# Patient Record
Sex: MALE | Race: BLACK OR AFRICAN AMERICAN | NOT HISPANIC OR LATINO | Employment: OTHER | URBAN - METROPOLITAN AREA
[De-identification: names, ages, dates, MRNs, and addresses within clinical notes are randomized per-mention and may not be internally consistent; named-entity substitution may affect disease eponyms.]

---

## 2017-06-14 ENCOUNTER — HOSPITAL ENCOUNTER (EMERGENCY)
Facility: HOSPITAL | Age: 57
Discharge: HOME | End: 2017-06-14
Attending: EMERGENCY MEDICINE
Payer: MEDICARE

## 2017-06-14 VITALS
HEIGHT: 69 IN | BODY MASS INDEX: 21.03 KG/M2 | DIASTOLIC BLOOD PRESSURE: 97 MMHG | WEIGHT: 142 LBS | SYSTOLIC BLOOD PRESSURE: 181 MMHG | OXYGEN SATURATION: 98 % | RESPIRATION RATE: 16 BRPM | HEART RATE: 68 BPM | TEMPERATURE: 98 F

## 2017-06-14 DIAGNOSIS — M25.519 SHOULDER PAIN: ICD-10-CM

## 2017-06-14 DIAGNOSIS — I10 ESSENTIAL HYPERTENSION: Primary | ICD-10-CM

## 2017-06-14 PROCEDURE — 25000003 PHARM REV CODE 250: Performed by: EMERGENCY MEDICINE

## 2017-06-14 PROCEDURE — 99283 EMERGENCY DEPT VISIT LOW MDM: CPT

## 2017-06-14 RX ORDER — CYCLOBENZAPRINE HCL 10 MG
10 TABLET ORAL
Status: COMPLETED | OUTPATIENT
Start: 2017-06-14 | End: 2017-06-14

## 2017-06-14 RX ORDER — CYCLOBENZAPRINE HCL 10 MG
10 TABLET ORAL 3 TIMES DAILY PRN
Qty: 12 TABLET | Refills: 0 | Status: SHIPPED | OUTPATIENT
Start: 2017-06-14 | End: 2017-06-19

## 2017-06-14 RX ADMIN — CYCLOBENZAPRINE HYDROCHLORIDE 10 MG: 10 TABLET, FILM COATED ORAL at 01:06

## 2017-06-14 NOTE — ED PROVIDER NOTES
"Encounter Date: 6/14/2017       History     Chief Complaint   Patient presents with    Arm Pain     worening rt. shoulder/arm pain and stiffness x4 days. limited rom noted. denies trauma or injury but reports hx. of dislocation     Review of patient's allergies indicates:  No Known Allergies  58 y/o male with pmhx of cervicalgia, DM, HLD, HTN, jaw pain, lipoma, lumbago, sciatica, and scoliosis presents with c/o right shoulder pain.  Pt reports that he slept on his shoulder "wrong" Friday night and woke up with right shoulder pain on Saturday morning.  Pain is well localized, sharp, stabbing and non-radiating. Pt has had constant right shoulder pain since that time and states that the pain is exacerbated with movement and palpation.  He reports that he is not weak but he has pain with abducting his right arm at the shoulder joint so he has not been using it much.  Pt has a hx of multiple shoulder dislocations as a child and states that this pain is similar.  He also states that he has dislocated his shoulder several times by "sleeping on it wrong."  No trauma.  No swelling.  No fever/chills/numbness or weakness.  No cp, neck pain or jaw pain.  No relieving factors attempted.           Past Medical History:   Diagnosis Date    Cervicalgia     Diabetes mellitus     Hypercholesteremia     Hypertension     Jaw pain     Lipoma of back     Lumbago     Premature beats, unspecified     Sciatica     Scoliosis (and kyphoscoliosis), idiopathic     Undiagnosed cardiac murmurs      History reviewed. No pertinent surgical history.  History reviewed. No pertinent family history.  Social History   Substance Use Topics    Smoking status: Never Smoker    Smokeless tobacco: Not on file    Alcohol use No     Review of Systems   Constitutional: Negative for chills, diaphoresis and fever.   HENT: Negative.    Eyes: Negative.    Respiratory: Negative for cough and shortness of breath.    Cardiovascular: Negative for chest " pain and palpitations.   Gastrointestinal: Negative for nausea and vomiting.   Endocrine: Negative.    Genitourinary: Negative.    Musculoskeletal: Negative for back pain, joint swelling and neck pain.        Well localized right shoulder pain that is sharp and stabbing   Skin: Negative for pallor and wound.   Allergic/Immunologic: Negative.    Neurological: Negative for weakness and numbness.   Hematological: Does not bruise/bleed easily.   Psychiatric/Behavioral: Negative.    All other systems reviewed and are negative.      Physical Exam     Initial Vitals [06/14/17 0047]   BP Pulse Resp Temp SpO2   (!) 181/97 68 16 97.5 °F (36.4 °C) 98 %     Physical Exam    Nursing note and vitals reviewed.  Constitutional: He appears well-developed and well-nourished. He is not diaphoretic. No distress.   HENT:   Head: Normocephalic and atraumatic.   Eyes: Conjunctivae are normal.   Neck: Normal range of motion. Neck supple.   Cardiovascular: Normal rate, regular rhythm and normal heart sounds. Exam reveals no gallop and no friction rub.    No murmur heard.  Pulmonary/Chest: Breath sounds normal. No respiratory distress. He has no wheezes. He has no rhonchi. He has no rales. He exhibits no tenderness.   Abdominal: Soft. He exhibits no distension. There is no tenderness.   Musculoskeletal: He exhibits tenderness. He exhibits no edema.        Right shoulder: He exhibits decreased range of motion, tenderness and pain. He exhibits no bony tenderness, no swelling, no effusion, no crepitus, no deformity, no laceration, no spasm, normal pulse and normal strength.        Arms:  Decreased rom due to pain.  Pt is able to abduct his right shoulder but reports pain when doing so.  No evidence of glenohumeral step off.     Lymphadenopathy:     He has no cervical adenopathy.   Neurological: He is alert and oriented to person, place, and time. He has normal strength. No sensory deficit.   Skin: Skin is warm and dry. No rash noted. No  erythema.   Psychiatric: He has a normal mood and affect. His behavior is normal. Judgment and thought content normal.         ED Course   Procedures  Labs Reviewed - No data to display          Medical Decision Making:   Initial Assessment:   58 y/o male with right shoulder pain.  Pt is otherwise nv intact.  Pt has a hx of htn but has not taken his meds today because of the pain  Differential Diagnosis:   DDX:  Sprain, fracture, dislocation, muscle spasm, HTN  Clinical Tests:   Radiological Study: Ordered and Reviewed  ED Management:  Xray neg.  Pt is nv intact by exam.  Pt is educated on the importance of taking his routine meds.  I will Rx flexeril for pain most likely due to muscle strain.  Pt has a pcp and will f/u this week regarding pain and b/p.     Pt counseled on their diagnosis and the importance of following up with PCP.  Pt also cautioned on when to return to ED.  Pt verbalizes understanding of discharge plan and will return to ED immediately if symptoms worsen                     ED Course     Clinical Impression:   The primary encounter diagnosis was Essential hypertension. A diagnosis of Shoulder pain was also pertinent to this visit.    Disposition:   Disposition: Discharged  Condition: Stable       Laura Bermeo MD  06/14/17 0202

## 2017-06-14 NOTE — ED NOTES
Patient identifiers verified and correct for Romero Bowens Jr..    LOC: The patient is awake, alert and aware of environment with an appropriate affect, the patient is oriented x 3 and speaking appropriately.  APPEARANCE: Patient resting comfortably and in no acute distressSKIN: The skin is warm and dry, color consistent with ethnicity, patient has normal skin turgor and moist mucus membranes, skin intact, no breakdown or bruising noted.  RESPIRATORY: Airway is open and patent, respirations are spontaneous, patient has a normal effort and rate, no accessory muscle use noted, bilateral breath sounds CTA  CARDIAC: Patient has a normal rate and regular rhythm, no periphreal edema noted, capillary refill < 3 seconds.  ABDOMEN: Soft and non tender to palpation, no distention noted, normoactive bowel sounds present in all four quadrants.

## 2017-06-19 PROCEDURE — 93010 ELECTROCARDIOGRAM REPORT: CPT | Mod: ,,, | Performed by: INTERNAL MEDICINE

## 2017-06-19 PROCEDURE — 96360 HYDRATION IV INFUSION INIT: CPT

## 2017-06-19 PROCEDURE — 93005 ELECTROCARDIOGRAM TRACING: CPT

## 2017-06-19 PROCEDURE — 99284 EMERGENCY DEPT VISIT MOD MDM: CPT | Mod: 25

## 2017-06-20 ENCOUNTER — HOSPITAL ENCOUNTER (EMERGENCY)
Facility: HOSPITAL | Age: 57
Discharge: HOME | End: 2017-06-20
Attending: EMERGENCY MEDICINE
Payer: MEDICARE

## 2017-06-20 VITALS
HEIGHT: 69 IN | HEART RATE: 61 BPM | DIASTOLIC BLOOD PRESSURE: 67 MMHG | SYSTOLIC BLOOD PRESSURE: 110 MMHG | OXYGEN SATURATION: 96 % | BODY MASS INDEX: 21.03 KG/M2 | TEMPERATURE: 98 F | WEIGHT: 142 LBS | RESPIRATION RATE: 15 BRPM

## 2017-06-20 DIAGNOSIS — R53.1 WEAKNESS: Primary | ICD-10-CM

## 2017-06-20 LAB
ALBUMIN SERPL BCP-MCNC: 3.5 G/DL
ALP SERPL-CCNC: 71 U/L
ALT SERPL W/O P-5'-P-CCNC: 26 U/L
ANION GAP SERPL CALC-SCNC: 9 MMOL/L
AST SERPL-CCNC: 30 U/L
BASOPHILS # BLD AUTO: 0.01 K/UL
BASOPHILS NFR BLD: 0.2 %
BILIRUB SERPL-MCNC: 0.5 MG/DL
BNP SERPL-MCNC: 30 PG/ML
BUN SERPL-MCNC: 21 MG/DL
CALCIUM SERPL-MCNC: 8.9 MG/DL
CHLORIDE SERPL-SCNC: 103 MMOL/L
CO2 SERPL-SCNC: 26 MMOL/L
CREAT SERPL-MCNC: 1.4 MG/DL
D DIMER PPP IA.FEU-MCNC: 0.2 MG/L FEU
DIFFERENTIAL METHOD: ABNORMAL
EOSINOPHIL # BLD AUTO: 0.1 K/UL
EOSINOPHIL NFR BLD: 2.1 %
ERYTHROCYTE [DISTWIDTH] IN BLOOD BY AUTOMATED COUNT: 12.7 %
EST. GFR  (AFRICAN AMERICAN): >60 ML/MIN/1.73 M^2
EST. GFR  (NON AFRICAN AMERICAN): 55 ML/MIN/1.73 M^2
GLUCOSE SERPL-MCNC: 168 MG/DL
HCT VFR BLD AUTO: 43.3 %
HGB BLD-MCNC: 14.7 G/DL
LYMPHOCYTES # BLD AUTO: 3.1 K/UL
LYMPHOCYTES NFR BLD: 57.8 %
MCH RBC QN AUTO: 29.7 PG
MCHC RBC AUTO-ENTMCNC: 33.9 %
MCV RBC AUTO: 88 FL
MONOCYTES # BLD AUTO: 0.5 K/UL
MONOCYTES NFR BLD: 8.5 %
NEUTROPHILS # BLD AUTO: 1.7 K/UL
NEUTROPHILS NFR BLD: 31.4 %
PLATELET # BLD AUTO: 180 K/UL
PMV BLD AUTO: 10.9 FL
POTASSIUM SERPL-SCNC: 4.3 MMOL/L
PROT SERPL-MCNC: 6.6 G/DL
RBC # BLD AUTO: 4.95 M/UL
SODIUM SERPL-SCNC: 138 MMOL/L
TROPONIN I SERPL DL<=0.01 NG/ML-MCNC: 0.02 NG/ML
WBC # BLD AUTO: 5.28 K/UL

## 2017-06-20 PROCEDURE — 83880 ASSAY OF NATRIURETIC PEPTIDE: CPT

## 2017-06-20 PROCEDURE — 84484 ASSAY OF TROPONIN QUANT: CPT

## 2017-06-20 PROCEDURE — 80053 COMPREHEN METABOLIC PANEL: CPT

## 2017-06-20 PROCEDURE — 85379 FIBRIN DEGRADATION QUANT: CPT

## 2017-06-20 PROCEDURE — 85025 COMPLETE CBC W/AUTO DIFF WBC: CPT

## 2017-06-20 PROCEDURE — 25000003 PHARM REV CODE 250: Performed by: EMERGENCY MEDICINE

## 2017-06-20 RX ORDER — SODIUM CHLORIDE 9 MG/ML
1000 INJECTION, SOLUTION INTRAVENOUS
Status: COMPLETED | OUTPATIENT
Start: 2017-06-20 | End: 2017-06-20

## 2017-06-20 RX ADMIN — SODIUM CHLORIDE 1000 ML: 0.9 INJECTION, SOLUTION INTRAVENOUS at 02:06

## 2017-06-20 NOTE — ED PROVIDER NOTES
Encounter Date: 6/19/2017       History     Chief Complaint   Patient presents with    Weakness     Patient presents to the ED with reports of having weakness with dizziness that started yesterday. Symptoms include shortness of breath on exertion.      Review of patient's allergies indicates:  No Known Allergies  57-year-old male with 2 episodes since yesterday of weakness and feeling faint hot blurred vision, and shortness of breath on exertion.  He's been eating and drinking as usual, however has been feeling lightheaded when he sits stands up.  He has never passed out, but has had 2 episodes of these since yesterday.  He also reports having some palpitations.  He has never been told he has irregular heart rhythms.      The history is provided by the patient.     Past Medical History:   Diagnosis Date    Cervicalgia     Diabetes mellitus     Hypercholesteremia     Hypertension     Jaw pain     Lipoma of back     Lumbago     Premature beats, unspecified     Sciatica     Scoliosis (and kyphoscoliosis), idiopathic     Undiagnosed cardiac murmurs      History reviewed. No pertinent surgical history.  History reviewed. No pertinent family history.  Social History   Substance Use Topics    Smoking status: Never Smoker    Smokeless tobacco: Never Used    Alcohol use No     Review of Systems   Eyes: Negative.    Respiratory: Negative.    Endocrine: Negative.    Genitourinary: Negative.    All other systems reviewed and are negative.      Physical Exam     Initial Vitals [06/19/17 2338]   BP Pulse Resp Temp SpO2   (!) 93/55 85 20 97.9 °F (36.6 °C) 98 %     Physical Exam    Nursing note and vitals reviewed.  Constitutional: He appears well-developed and well-nourished.   HENT:   Head: Normocephalic.   Eyes: EOM are normal. Pupils are equal, round, and reactive to light.   Neck: Normal range of motion.   Cardiovascular: Normal rate, regular rhythm and normal heart sounds.   Pulmonary/Chest: Breath sounds  normal.   Abdominal: Soft. Bowel sounds are normal.   Musculoskeletal: Normal range of motion.   Neurological: He is alert and oriented to person, place, and time.   Skin: Skin is warm and dry.   Psychiatric: He has a normal mood and affect.         ED Course   Procedures  Labs Reviewed   CBC W/ AUTO DIFFERENTIAL - Abnormal; Notable for the following:        Result Value    Gran # 1.7 (*)     Gran% 31.4 (*)     Lymph% 57.8 (*)     All other components within normal limits   COMPREHENSIVE METABOLIC PANEL - Abnormal; Notable for the following:     Glucose 168 (*)     BUN, Bld 21 (*)     eGFR if non  55 (*)     All other components within normal limits   TROPONIN I   D DIMER, QUANTITATIVE   D DIMER, QUANTITATIVE   B-TYPE NATRIURETIC PEPTIDE   B-TYPE NATRIURETIC PEPTIDE     EKG Readings: (Independently Interpreted)   Initial Reading: No STEMI. Rhythm: Normal Sinus Rhythm. Ectopy: No Ectopy. Conduction: Normal. ST Segments: Normal ST Segments. T Waves: Normal. Axis: Normal.   NSR rate of 69, normal axis, no ST changes           Medical Decision Making:   Initial Assessment:   57-year-old male presents to the emergency department with a 2 episodes of weakness and presyncopal episodes  Differential Diagnosis:   Arrhythmia, metabolic derangement, hypokalemia, hypothyroidism  Clinical Tests:   Lab Tests: Ordered and Reviewed  Radiological Study: Ordered and Reviewed  Medical Tests: Ordered and Reviewed  ED Management:  Patient felt better with IVF  Serial troponins normal  DDimer negative  CBC and CMP WNL  CXR clear    Patient discharged in stable condition with return precautions for all conditions discussed with patient and/or any available family members. No further emergent ED evaluation or treatment clinically indicated at this time.      I discussed with patient that evaluation in the ED at this time does not suggest any emergent or life threatening condition medical condition requiring immediate  intervention beyond what was provided in the ED. Regardless, an unremarkable evaluation in the ED does not preclude the development or presence of a serious of life threatening condition. As such, patient was instructed to return immediately for any worsening or change in current symptoms.                     ED Course     Clinical Impression:   The encounter diagnosis was Weakness.          Hilda Feldman MD  06/23/17 1352

## 2017-06-20 NOTE — ED TRIAGE NOTES
Patient presents to the ED with reports of having weakness with dizziness that started yesterday. Symptoms include shortness of breath on exertion. Denies any fever, chills, nausea, vomiting or diarrhea.     Review of patient's allergies indicates:  No Known Allergies     Patient has verified the spelling of their name and  on armband.   APPEARANCE: Patient is alert, calm, oriented x 4, and does not appear distressed.  SKIN: Skin is normal for race, warm, and dry. Normal skin turgor and mucous membranes moist.  CARDIAC: Normal rate and rhythm, no murmur heard.   RESPIRATORY:Normal rate and effort. Breath sounds clear bilaterally throughout chest. Respirations are equal and unlabored. Shortness of breath on exertion. Denies any SOB at this time.   GASTRO: Bowel sounds normal, abdomen is soft, no tenderness, and no abdominal distention.  MUSCLE: Full ROM. No bony tenderness or soft tissue tenderness. No obvious deformity.  NEURO: 5/5 strength major flexors/extensors bilaterally. Sensory intact to light touch bilaterally. GCS 15. +Weakness +Dizziness.   MENTAL STATUS: awake, alert and aware of environment.  EYE: PERRL, both eyes: pupils brisk and reactive to light. Normal size.

## 2017-11-09 ENCOUNTER — HOSPITAL ENCOUNTER (EMERGENCY)
Facility: HOSPITAL | Age: 57
Discharge: HOME | End: 2017-11-10
Attending: EMERGENCY MEDICINE
Payer: MEDICARE

## 2017-11-09 VITALS
RESPIRATION RATE: 17 BRPM | OXYGEN SATURATION: 99 % | WEIGHT: 145 LBS | HEIGHT: 69 IN | DIASTOLIC BLOOD PRESSURE: 76 MMHG | TEMPERATURE: 98 F | HEART RATE: 67 BPM | BODY MASS INDEX: 21.48 KG/M2 | SYSTOLIC BLOOD PRESSURE: 149 MMHG

## 2017-11-09 DIAGNOSIS — R53.1 WEAKNESS: Primary | ICD-10-CM

## 2017-11-09 DIAGNOSIS — R73.9 HYPERGLYCEMIA: ICD-10-CM

## 2017-11-09 DIAGNOSIS — N30.00 ACUTE CYSTITIS WITHOUT HEMATURIA: ICD-10-CM

## 2017-11-09 LAB
ALBUMIN SERPL BCP-MCNC: 3.7 G/DL
ALP SERPL-CCNC: 71 U/L
ALT SERPL W/O P-5'-P-CCNC: 38 U/L
AMPHET+METHAMPHET UR QL: NEGATIVE
ANION GAP SERPL CALC-SCNC: 11 MMOL/L
AST SERPL-CCNC: 22 U/L
BACTERIA #/AREA URNS HPF: ABNORMAL /HPF
BARBITURATES UR QL SCN>200 NG/ML: NEGATIVE
BASOPHILS # BLD AUTO: 0.02 K/UL
BASOPHILS NFR BLD: 0.3 %
BENZODIAZ UR QL SCN>200 NG/ML: NEGATIVE
BILIRUB SERPL-MCNC: 0.5 MG/DL
BILIRUB UR QL STRIP: NEGATIVE
BUN SERPL-MCNC: 11 MG/DL
BZE UR QL SCN: NEGATIVE
CALCIUM SERPL-MCNC: 9.6 MG/DL
CANNABINOIDS UR QL SCN: NORMAL
CHLORIDE SERPL-SCNC: 100 MMOL/L
CK SERPL-CCNC: 110 U/L
CLARITY UR: CLEAR
CO2 SERPL-SCNC: 27 MMOL/L
COLOR UR: YELLOW
CREAT SERPL-MCNC: 1.2 MG/DL
CREAT UR-MCNC: 104.5 MG/DL
DIFFERENTIAL METHOD: NORMAL
EOSINOPHIL # BLD AUTO: 0 K/UL
EOSINOPHIL NFR BLD: 0.6 %
ERYTHROCYTE [DISTWIDTH] IN BLOOD BY AUTOMATED COUNT: 11.9 %
EST. GFR  (AFRICAN AMERICAN): >60 ML/MIN/1.73 M^2
EST. GFR  (NON AFRICAN AMERICAN): >60 ML/MIN/1.73 M^2
ETHANOL SERPL-MCNC: <10 MG/DL
FLUAV AG SPEC QL IA: NEGATIVE
FLUBV AG SPEC QL IA: NEGATIVE
GLUCOSE SERPL-MCNC: 396 MG/DL
GLUCOSE UR QL STRIP: ABNORMAL
HCT VFR BLD AUTO: 44.3 %
HGB BLD-MCNC: 15.7 G/DL
HGB UR QL STRIP: ABNORMAL
KETONES UR QL STRIP: ABNORMAL
LEUKOCYTE ESTERASE UR QL STRIP: ABNORMAL
LYMPHOCYTES # BLD AUTO: 3.1 K/UL
LYMPHOCYTES NFR BLD: 47.1 %
MCH RBC QN AUTO: 30.3 PG
MCHC RBC AUTO-ENTMCNC: 35.4 G/DL
MCV RBC AUTO: 86 FL
METHADONE UR QL SCN>300 NG/ML: NEGATIVE
MICROSCOPIC COMMENT: ABNORMAL
MONOCYTES # BLD AUTO: 0.5 K/UL
MONOCYTES NFR BLD: 6.9 %
NEUTROPHILS # BLD AUTO: 3 K/UL
NEUTROPHILS NFR BLD: 44.9 %
NITRITE UR QL STRIP: NEGATIVE
OPIATES UR QL SCN: NEGATIVE
PCP UR QL SCN>25 NG/ML: NEGATIVE
PH UR STRIP: 6 [PH] (ref 5–8)
PLATELET # BLD AUTO: 198 K/UL
PMV BLD AUTO: 12.1 FL
POCT GLUCOSE: 162 MG/DL (ref 70–110)
POCT GLUCOSE: 381 MG/DL (ref 70–110)
POTASSIUM SERPL-SCNC: 4.4 MMOL/L
PROT SERPL-MCNC: 7 G/DL
PROT UR QL STRIP: NEGATIVE
RBC # BLD AUTO: 5.18 M/UL
RBC #/AREA URNS HPF: 8 /HPF (ref 0–4)
SODIUM SERPL-SCNC: 138 MMOL/L
SP GR UR STRIP: 1.01 (ref 1–1.03)
SPECIMEN SOURCE: NORMAL
SQUAMOUS #/AREA URNS HPF: 3 /HPF
TOXICOLOGY INFORMATION: NORMAL
URN SPEC COLLECT METH UR: ABNORMAL
UROBILINOGEN UR STRIP-ACNC: NEGATIVE EU/DL
WBC # BLD AUTO: 6.66 K/UL
WBC #/AREA URNS HPF: 10 /HPF (ref 0–5)
YEAST URNS QL MICRO: ABNORMAL

## 2017-11-09 PROCEDURE — 93010 ELECTROCARDIOGRAM REPORT: CPT | Mod: ,,, | Performed by: INTERNAL MEDICINE

## 2017-11-09 PROCEDURE — 85025 COMPLETE CBC W/AUTO DIFF WBC: CPT

## 2017-11-09 PROCEDURE — 82962 GLUCOSE BLOOD TEST: CPT

## 2017-11-09 PROCEDURE — 82550 ASSAY OF CK (CPK): CPT

## 2017-11-09 PROCEDURE — 25000003 PHARM REV CODE 250: Performed by: EMERGENCY MEDICINE

## 2017-11-09 PROCEDURE — 87400 INFLUENZA A/B EACH AG IA: CPT

## 2017-11-09 PROCEDURE — 80307 DRUG TEST PRSMV CHEM ANLYZR: CPT

## 2017-11-09 PROCEDURE — 96361 HYDRATE IV INFUSION ADD-ON: CPT

## 2017-11-09 PROCEDURE — 96374 THER/PROPH/DIAG INJ IV PUSH: CPT

## 2017-11-09 PROCEDURE — 99284 EMERGENCY DEPT VISIT MOD MDM: CPT | Mod: 25

## 2017-11-09 PROCEDURE — 63600175 PHARM REV CODE 636 W HCPCS: Performed by: EMERGENCY MEDICINE

## 2017-11-09 PROCEDURE — 81000 URINALYSIS NONAUTO W/SCOPE: CPT

## 2017-11-09 PROCEDURE — 93005 ELECTROCARDIOGRAM TRACING: CPT

## 2017-11-09 PROCEDURE — 80320 DRUG SCREEN QUANTALCOHOLS: CPT

## 2017-11-09 PROCEDURE — 80053 COMPREHEN METABOLIC PANEL: CPT

## 2017-11-09 RX ORDER — CEPHALEXIN 500 MG/1
500 CAPSULE ORAL EVERY 12 HOURS
Qty: 10 CAPSULE | Refills: 0 | Status: SHIPPED | OUTPATIENT
Start: 2017-11-09 | End: 2019-04-08

## 2017-11-09 RX ORDER — SODIUM CHLORIDE 9 MG/ML
1000 INJECTION, SOLUTION INTRAVENOUS
Status: COMPLETED | OUTPATIENT
Start: 2017-11-09 | End: 2017-11-09

## 2017-11-09 RX ADMIN — INSULIN HUMAN 5 UNITS: 100 INJECTION, SOLUTION PARENTERAL at 10:11

## 2017-11-09 RX ADMIN — SODIUM CHLORIDE 1000 ML: 0.9 INJECTION, SOLUTION INTRAVENOUS at 10:11

## 2017-11-09 RX ADMIN — SODIUM CHLORIDE 1000 ML: 0.9 INJECTION, SOLUTION INTRAVENOUS at 09:11

## 2017-11-10 NOTE — ED PROVIDER NOTES
Encounter Date: 11/9/2017    SCRIBE #1 NOTE: I, Salome Bergeron, am scribing for, and in the presence of, Dr. Gallardo.       History     Chief Complaint   Patient presents with    Weakness     patient ambulatory to triage and reports weakness with body aches x 2 weeks and was worse with joint pain and weakness today     Time seen by provider: 10:10 PM    This is a 57 y.o. male with history of HTN, DM, HLD, who presents with complaint of persistent fatigue x 2 weeks. Patient states that he believes his fatigue is related to elevated blood glucose level. He takes metformin and reports compliant with this. He endorses associated urinary frequency. He denies fever, chest pain, SOB, dysuria, sore throat, vomiting, or other symptoms at this time. The patient's PCP is Dr. Acosta.       The history is provided by the patient.     Review of patient's allergies indicates:  No Known Allergies  Past Medical History:   Diagnosis Date    Cervicalgia     Diabetes mellitus     Hypercholesteremia     Hypertension     Jaw pain     Lipoma of back     Lumbago     Premature beats, unspecified     Sciatica     Scoliosis (and kyphoscoliosis), idiopathic     Undiagnosed cardiac murmurs      History reviewed. No pertinent surgical history.  History reviewed. No pertinent family history.  Social History   Substance Use Topics    Smoking status: Never Smoker    Smokeless tobacco: Never Used    Alcohol use No     Review of Systems   Constitutional: Positive for fatigue. Negative for chills and fever.   HENT: Negative for congestion, rhinorrhea and trouble swallowing.    Eyes: Negative for photophobia, pain, redness and visual disturbance.   Respiratory: Negative for shortness of breath.    Cardiovascular: Negative for chest pain.   Gastrointestinal: Negative for abdominal pain, diarrhea, nausea and vomiting.   Endocrine: Positive for polydipsia, polyphagia and polyuria.   Genitourinary: Positive for frequency and urgency. Negative  for dysuria and flank pain.   Musculoskeletal: Negative for back pain, neck pain and neck stiffness.   Skin: Negative for rash.   Neurological: Negative for seizures, syncope, speech difficulty, numbness and headaches.   All other systems reviewed and are negative.      Physical Exam     Initial Vitals [11/09/17 2039]   BP Pulse Resp Temp SpO2   135/66 100 20 98.8 °F (37.1 °C) 98 %      MAP       89         Physical Exam    Nursing note and vitals reviewed.  Constitutional: He appears well-developed and well-nourished. No distress.   HENT:   Head: Normocephalic and atraumatic.   Mouth/Throat: Mucous membranes are dry.   Oropharynx clear.   Eyes: Conjunctivae and EOM are normal. Pupils are equal, round, and reactive to light.   Neck: Normal range of motion. Neck supple. No tracheal deviation present.   Cardiovascular: Normal rate, regular rhythm, normal heart sounds and intact distal pulses.   Pulmonary/Chest: Breath sounds normal. No respiratory distress. He has no wheezes. He has no rhonchi. He has no rales.   Abdominal: Soft. Bowel sounds are normal. He exhibits no distension. There is no tenderness. There is no rebound and no guarding.   Musculoskeletal: Normal range of motion. He exhibits no edema or tenderness.   Neurological: He is alert and oriented to person, place, and time. He has normal strength. No cranial nerve deficit or sensory deficit.   Skin: Skin is warm and dry. Capillary refill takes less than 2 seconds.         ED Course   Procedures  Labs Reviewed   COMPREHENSIVE METABOLIC PANEL - Abnormal; Notable for the following:        Result Value    Glucose 396 (*)     All other components within normal limits   URINALYSIS - Abnormal; Notable for the following:     Glucose, UA 3+ (*)     Ketones, UA 1+ (*)     Occult Blood UA 1+ (*)     Leukocytes, UA Trace (*)     All other components within normal limits   URINALYSIS MICROSCOPIC - Abnormal; Notable for the following:     RBC, UA 8 (*)     WBC, UA 10  (*)     Bacteria, UA Few (*)     Yeast, UA Rare (*)     All other components within normal limits   POCT GLUCOSE - Abnormal; Notable for the following:     POCT Glucose 381 (*)     All other components within normal limits   POCT GLUCOSE - Abnormal; Notable for the following:     POCT Glucose 162 (*)     All other components within normal limits   CBC W/ AUTO DIFFERENTIAL   CK   INFLUENZA A AND B ANTIGEN   ALCOHOL,MEDICAL (ETHANOL)   DRUG SCREEN PANEL, URINE EMERGENCY   POCT GLUCOSE MONITORING CONTINUOUS   POCT GLUCOSE MONITORING CONTINUOUS     Imaging Results          X-Ray Chest PA And Lateral (Final result)  Result time 11/09/17 21:30:50    Final result by Arian Laura MD (11/09/17 21:30:50)                 Impression:        No detrimental change or radiographic acute intrathoracic process seen.      Electronically signed by: ARIAN LAURA MD, MD  Date:     11/09/17  Time:    21:30              Narrative:    COMPARISON: Chest radiograph 6/20/17    FINDINGS: PA and lateral views of the chest. No detrimental change.   Pulmonary vasculature and hilar regions are within normal limits.  The bilateral lungs are well expanded and clear.  No pleural effusion or pneumothorax.  Calcific atherosclerosis of the aortic arch. The heart and mediastinal contours are within normal limits for age.  Included osseous structures appear stable without acute process seen.                                   X-Rays:   Independently Interpreted Readings:   Chest X-Ray: Chest x-ray interpreted by radiologist and visualized by me:     Imaging Results          X-Ray Chest PA And Lateral (Final result)  Result time 11/09/17 21:30:50    Final result by Arian Laura MD (11/09/17 21:30:50)                 Impression:        No detrimental change or radiographic acute intrathoracic process seen.      Electronically signed by: ARIAN LAURA MD, MD  Date:     11/09/17  Time:    21:30              Narrative:    COMPARISON: Chest radiograph  6/20/17    FINDINGS: PA and lateral views of the chest. No detrimental change.   Pulmonary vasculature and hilar regions are within normal limits.  The bilateral lungs are well expanded and clear.  No pleural effusion or pneumothorax.  Calcific atherosclerosis of the aortic arch. The heart and mediastinal contours are within normal limits for age.  Included osseous structures appear stable without acute process seen.                              Medical Decision Making:   History:   Old Medical Records: I decided to obtain old medical records.  Initial Assessment:   This is a 57 y.o. male with history of HTN, DM, HLD, who presents with complaint of persistent fatigue x 2 weeks  Differential Diagnosis:   DKA, dehydration, hypoglycemia, electrolyte dyscrasia, rhabdomyolysis, substance abuse,  Independently Interpreted Test(s):   I have ordered and independently interpreted X-rays - see prior notes.  Clinical Tests:   Lab Tests: Reviewed       <> Summary of Lab: Hhyperglycemia, UTI  ED Management:  11:35 PM  Sugar controlled with IV fluids and insulin. Vital signs stable and he has had no complaints at this time. He was instructed to FU with his PCP for further management and evaluation and return for any new or worsening symptoms. Given a prescription for his urinary tract infection.  Urine             Scribe Attestation:   Scribe #1: I performed the above scribed service and the documentation accurately describes the services I performed. I attest to the accuracy of the note.            ED Course      Clinical Impression:   The primary encounter diagnosis was Weakness. Diagnoses of Hyperglycemia and Acute cystitis without hematuria were also pertinent to this visit.    Disposition:   Disposition: Discharged  Condition: Stable       I, Dr. Magno Gallardo, personally performed the services described in this documentation. All medical record entries made by the scribe were at my direction and in my presence.  I have  reviewed the chart and agree that the record reflects my personal performance and is accurate and complete. Magno Gallardo MD.  12:43 AM 11/15/2017                     Magno Gallardo MD  11/15/17 0045       Magno Gallardo MD  11/15/17 0046

## 2018-03-09 ENCOUNTER — TELEPHONE (OUTPATIENT)
Dept: TRANSPLANT | Facility: CLINIC | Age: 58
End: 2018-03-09

## 2018-03-09 NOTE — TELEPHONE ENCOUNTER
----- Message from Yoli Hearn sent at 3/9/2018 11:55 AM CST -----  We have the pt recorders and they are now pending review by the referral nurse.  By:Yoli Hearn

## 2018-03-12 ENCOUNTER — DOCUMENTATION ONLY (OUTPATIENT)
Dept: TRANSPLANT | Facility: CLINIC | Age: 58
End: 2018-03-12

## 2018-03-12 NOTE — LETTER
March 12, 2018    Romero Bowens  [address]  [place] [address]      Dear Romero Bowens:    Your doctor has referred you to the [facility]. You will be contacted by our office and an initial appointment will then be scheduled for you.    We look forward to seeing you soon. If you have any further questions, please contact us at [phone].       Sincerely,        [facility]   [facility] [place] [address]  [phone]

## 2018-03-12 NOTE — LETTER
March 12, 2018    Dave Donaldson NP  [address]  [address]  [facility] [address]      Dear Dr. Donaldson    Patient: Romero Bowens Jr.   MR Number: [id number]   YOB: 1960     Thank you for the referral of Romero Bowens Jr. to the [facility]. An initial appointment will be scheduled for your patient with one of our Hepatologists.      Thank you again for your trust in our program.  If there is anything we can do for you or your staff, please feel free to contact us.        Sincerely,        [facility]  [address] [place] [id number]  [phone]

## 2018-03-12 NOTE — NURSING
Pt records reviewed.  Pt will be referred to [facility] due to HEP C ab +  Initial referral received  from Dave Donaldson NP 's  office.   Referral letter sent to provider and patient.  Pt records reviewed.

## 2018-03-19 ENCOUNTER — TELEPHONE (OUTPATIENT)
Dept: HEPATOLOGY | Facility: CLINIC | Age: 58
End: 2018-03-19

## 2018-03-19 NOTE — TELEPHONE ENCOUNTER
Dave Donaldson NP has ordered that patient be scheduled for hep c consult.  Attempt made to reach him by phone to schedule appt with [facility].  Phone numbers invalid.  Letter sent asking that he give us a call for scheduling.

## 2018-04-27 NOTE — TELEPHONE ENCOUNTER
No response received from patient for hep c consult scheduling.  Chart sent for scanning into Epic.

## 2018-05-22 ENCOUNTER — HOSPITAL ENCOUNTER (EMERGENCY)
Facility: HOSPITAL | Age: 58
Discharge: HOME | End: 2018-05-22
Attending: EMERGENCY MEDICINE
Payer: MEDICARE

## 2018-05-22 VITALS
HEART RATE: 74 BPM | TEMPERATURE: 98 F | BODY MASS INDEX: 22.22 KG/M2 | WEIGHT: 150 LBS | HEIGHT: 69 IN | OXYGEN SATURATION: 99 % | SYSTOLIC BLOOD PRESSURE: 128 MMHG | RESPIRATION RATE: 16 BRPM | DIASTOLIC BLOOD PRESSURE: 77 MMHG

## 2018-05-22 DIAGNOSIS — R42 DIZZINESS: ICD-10-CM

## 2018-05-22 DIAGNOSIS — N17.9 ACUTE KIDNEY INJURY: ICD-10-CM

## 2018-05-22 DIAGNOSIS — I95.9 HYPOTENSION, UNSPECIFIED HYPOTENSION TYPE: Primary | ICD-10-CM

## 2018-05-22 DIAGNOSIS — R06.02 SOB (SHORTNESS OF BREATH): ICD-10-CM

## 2018-05-22 LAB
ALBUMIN SERPL BCP-MCNC: 3.5 G/DL
ALP SERPL-CCNC: 67 U/L
ALT SERPL W/O P-5'-P-CCNC: 26 U/L
AMPHET+METHAMPHET UR QL: NEGATIVE
ANION GAP SERPL CALC-SCNC: 12 MMOL/L
AST SERPL-CCNC: 20 U/L
BARBITURATES UR QL SCN>200 NG/ML: NEGATIVE
BASOPHILS # BLD AUTO: 0.01 K/UL
BASOPHILS NFR BLD: 0.2 %
BENZODIAZ UR QL SCN>200 NG/ML: NEGATIVE
BILIRUB SERPL-MCNC: 0.3 MG/DL
BNP SERPL-MCNC: 19 PG/ML
BUN SERPL-MCNC: 29 MG/DL
BZE UR QL SCN: NEGATIVE
CALCIUM SERPL-MCNC: 9 MG/DL
CANNABINOIDS UR QL SCN: ABNORMAL
CHLORIDE SERPL-SCNC: 109 MMOL/L
CO2 SERPL-SCNC: 19 MMOL/L
CREAT SERPL-MCNC: 1.7 MG/DL
CREAT UR-MCNC: 405.7 MG/DL
DIFFERENTIAL METHOD: ABNORMAL
EOSINOPHIL # BLD AUTO: 0.1 K/UL
EOSINOPHIL NFR BLD: 1.8 %
ERYTHROCYTE [DISTWIDTH] IN BLOOD BY AUTOMATED COUNT: 12.7 %
EST. GFR  (AFRICAN AMERICAN): 50 ML/MIN/1.73 M^2
EST. GFR  (NON AFRICAN AMERICAN): 43 ML/MIN/1.73 M^2
GLUCOSE SERPL-MCNC: 151 MG/DL
HCT VFR BLD AUTO: 40.5 %
HGB BLD-MCNC: 12.9 G/DL
LYMPHOCYTES # BLD AUTO: 3.1 K/UL
LYMPHOCYTES NFR BLD: 51.5 %
MAGNESIUM SERPL-MCNC: 2 MG/DL
MCH RBC QN AUTO: 27.7 PG
MCHC RBC AUTO-ENTMCNC: 31.9 G/DL
MCV RBC AUTO: 87 FL
METHADONE UR QL SCN>300 NG/ML: NEGATIVE
MONOCYTES # BLD AUTO: 0.6 K/UL
MONOCYTES NFR BLD: 9.5 %
NEUTROPHILS # BLD AUTO: 2.2 K/UL
NEUTROPHILS NFR BLD: 37 %
OPIATES UR QL SCN: NEGATIVE
PCP UR QL SCN>25 NG/ML: NEGATIVE
PLATELET # BLD AUTO: 208 K/UL
PMV BLD AUTO: 11.1 FL
POTASSIUM SERPL-SCNC: 4.9 MMOL/L
PROT SERPL-MCNC: 6.6 G/DL
RBC # BLD AUTO: 4.65 M/UL
SODIUM SERPL-SCNC: 140 MMOL/L
TOXICOLOGY INFORMATION: ABNORMAL
TROPONIN I SERPL DL<=0.01 NG/ML-MCNC: <0.006 NG/ML
WBC # BLD AUTO: 5.98 K/UL

## 2018-05-22 PROCEDURE — 99284 EMERGENCY DEPT VISIT MOD MDM: CPT | Mod: 25

## 2018-05-22 PROCEDURE — 83735 ASSAY OF MAGNESIUM: CPT

## 2018-05-22 PROCEDURE — 25000003 PHARM REV CODE 250: Performed by: EMERGENCY MEDICINE

## 2018-05-22 PROCEDURE — 96360 HYDRATION IV INFUSION INIT: CPT

## 2018-05-22 PROCEDURE — 83880 ASSAY OF NATRIURETIC PEPTIDE: CPT

## 2018-05-22 PROCEDURE — 84484 ASSAY OF TROPONIN QUANT: CPT

## 2018-05-22 PROCEDURE — 80307 DRUG TEST PRSMV CHEM ANLYZR: CPT

## 2018-05-22 PROCEDURE — 93005 ELECTROCARDIOGRAM TRACING: CPT

## 2018-05-22 PROCEDURE — 96361 HYDRATE IV INFUSION ADD-ON: CPT

## 2018-05-22 PROCEDURE — 93010 ELECTROCARDIOGRAM REPORT: CPT | Mod: S$GLB,,, | Performed by: INTERNAL MEDICINE

## 2018-05-22 PROCEDURE — 85025 COMPLETE CBC W/AUTO DIFF WBC: CPT

## 2018-05-22 PROCEDURE — 80053 COMPREHEN METABOLIC PANEL: CPT

## 2018-05-22 RX ORDER — SODIUM CHLORIDE 9 MG/ML
1000 INJECTION, SOLUTION INTRAVENOUS
Status: COMPLETED | OUTPATIENT
Start: 2018-05-22 | End: 2018-05-22

## 2018-05-22 RX ADMIN — SODIUM CHLORIDE 1000 ML: 0.9 INJECTION, SOLUTION INTRAVENOUS at 02:05

## 2018-05-22 RX ADMIN — SODIUM CHLORIDE 1000 ML: 0.9 INJECTION, SOLUTION INTRAVENOUS at 01:05

## 2018-05-22 NOTE — ED PROVIDER NOTES
"Encounter Date: 5/22/2018       History     Chief Complaint   Patient presents with    Weakness     58y M ambulatory to ED with c/o feeling weak and dizzy at home for a "few days." he is also requesting a CBG because he has not been checking it or taking his insulin.  in triage     58-year-old black male with history of diabetes cardiac murmurs hypercholesterol anemia and hypertension presents for being dizzy and lightheaded predominantly when standing up.  No scar associated chest pain shortness of breath focal weakness slurred speech or blurred vision.  Patient denies alcohol use although he has a previous history of alcohol use.  No urinary symptoms no cough runny nose fever or chills.          Review of patient's allergies indicates:  No Known Allergies  Past Medical History:   Diagnosis Date    Cervicalgia     Diabetes mellitus     Hypercholesteremia     Hypertension     Jaw pain     Lipoma of back     Lumbago     Premature beats, unspecified     Sciatica     Scoliosis (and kyphoscoliosis), idiopathic     Undiagnosed cardiac murmurs      History reviewed. No pertinent surgical history.  History reviewed. No pertinent family history.  Social History   Substance Use Topics    Smoking status: Never Smoker    Smokeless tobacco: Never Used    Alcohol use No     Review of Systems    Physical Exam     Initial Vitals [05/22/18 0041]   BP Pulse Resp Temp SpO2   (!) 96/52 63 17 98.2 °F (36.8 °C) 100 %      MAP       66.67         Physical Exam    Nursing note and vitals reviewed.  Constitutional:   Thin black male alert oriented in mild distress.   HENT:   Head: Normocephalic and atraumatic.   Eyes: Conjunctivae and EOM are normal. Pupils are equal, round, and reactive to light.   Neck: Normal range of motion. Neck supple.   Cardiovascular:   Murmur heard.  2/6 systolic ejection murmur   Pulmonary/Chest: He has rhonchi.   Abdominal: Soft. Bowel sounds are normal.   Musculoskeletal: Normal range of " motion.   Neurological: He is alert and oriented to person, place, and time. He has normal strength and normal reflexes.   Skin: Skin is warm and dry. Capillary refill takes less than 2 seconds.   Psychiatric: He has a normal mood and affect. His behavior is normal. Judgment and thought content normal.         ED Course   Procedures  Labs Reviewed   COMPREHENSIVE METABOLIC PANEL   CBC W/ AUTO DIFFERENTIAL   TROPONIN I   B-TYPE NATRIURETIC PEPTIDE     EKG Readings: (Independently Interpreted)   EKG reveals a normal sinus rhythm with a rate of 60 p.r.n. will 142 QRS duration 72 QTC of 442 normal axis          Medical Decision Making:   Initial Assessment:   58-year-old black male with history hepatitis C as well as prior alcohol use, as well as insulin-dependent diabetes as well as hypertension presents for several days of orthostatics dizziness.  The patient has not had a change in his medication and denies any recent illness.  No chest pain or shortness of breath. No fever chills nausea vomiting diarrhea dysuria or frequency.  Differential Diagnosis:   Orthostatics syncope, vasovagal syncope viral illness, dehydration, over medication, myocardial infarction, autonomic dysreflexia  ED Management:  Patient underwent laboratory evaluation as well as EKG was given IV fluids with marked improvement of symptoms. Patient was found to be orthostatic blood pressure dropping to 85 with standing he is on 40 mg of benazepril daily along with his diabetic medications.  As the patient is afebrile and not clinically ill feeling may be overmedicated and somewhat dehydrated.  Patient was found to have a creatinine 1.7.  The patient was given 3 L of normal saline.  It was recommend patient be admitted for repeat lab evaluation medication adjustment.  However patient states her follow up with his PCP provider Dr. mcbride tomorrow and get his blood work repeated.  I instructed him that at the present time I would hold his medication till  he sees his primary care provider and then once he started back I will only take half a tablet daily he is presently on 40 mg of benazepril daily I told him I would cut the dose in half.  But wait till he sees his primary care provider gets his laboratory values repeated                   ED Course as of May 22 0217   Tue May 22, 2018   0134 WBC: 5.98 [GS]   0134 Hematocrit: 40.5 [GS]   0134 Hemoglobin: (!) 12.9 [GS]   0205 CO2: (!) 19 [GS]   0205 Glucose: (!) 151 [GS]   0205 BUN, Bld: (!) 29 [GS]   0205 Creatinine: (!) 1.7 [GS]   0205 Magnesium: 2.0 [GS]   0205 Troponin I: <0.006 [GS]      ED Course User Index  [GS] Franco Pimentel MD     Clinical Impression:Orthostasis   Diagnoses of Dizziness and SOB (shortness of breath) were pertinent to this visit.                           Franco Pimentel MD  05/22/18 0056       Franco Pimentel MD  05/22/18 0059       Franco Pimentel MD  05/22/18 0139       Franco Pimentel MD  05/22/18 0146       Franco Pimentel MD  05/22/18 0227

## 2018-05-22 NOTE — ED NOTES
Care of pt assumed at this time.  Awake and alert, oriented x 4.  resp even and unlabored. Lungs clear.  O2 sat 99% on room air.  abd soft and non tender at this time.  + BS noted.  Pt c/o weakness x 4 days, states that he feels worse this am then yesterday.  IVF in progress at this time.  Pt instructed on need for urine sample.

## 2018-09-12 ENCOUNTER — HOSPITAL ENCOUNTER (EMERGENCY)
Facility: HOSPITAL | Age: 58
Discharge: HOME | End: 2018-09-12
Attending: EMERGENCY MEDICINE
Payer: MEDICARE

## 2018-09-12 VITALS
HEIGHT: 69 IN | WEIGHT: 147 LBS | TEMPERATURE: 98 F | SYSTOLIC BLOOD PRESSURE: 191 MMHG | OXYGEN SATURATION: 100 % | HEART RATE: 55 BPM | BODY MASS INDEX: 21.77 KG/M2 | RESPIRATION RATE: 18 BRPM | DIASTOLIC BLOOD PRESSURE: 87 MMHG

## 2018-09-12 DIAGNOSIS — H81.399 PERIPHERAL VERTIGO, UNSPECIFIED LATERALITY: Primary | ICD-10-CM

## 2018-09-12 DIAGNOSIS — R42 DIZZINESSES: ICD-10-CM

## 2018-09-12 DIAGNOSIS — E87.6 HYPOKALEMIA: ICD-10-CM

## 2018-09-12 DIAGNOSIS — I10 HYPERTENSION, UNSPECIFIED TYPE: ICD-10-CM

## 2018-09-12 LAB
ALBUMIN SERPL BCP-MCNC: 3.8 G/DL
ALP SERPL-CCNC: 69 U/L
ALT SERPL W/O P-5'-P-CCNC: 22 U/L
ANION GAP SERPL CALC-SCNC: 10 MMOL/L
AST SERPL-CCNC: 22 U/L
BASOPHILS # BLD AUTO: 0.01 K/UL
BASOPHILS NFR BLD: 0.2 %
BILIRUB SERPL-MCNC: 0.4 MG/DL
BUN SERPL-MCNC: 8 MG/DL
CALCIUM SERPL-MCNC: 9.1 MG/DL
CHLORIDE SERPL-SCNC: 103 MMOL/L
CO2 SERPL-SCNC: 27 MMOL/L
CREAT SERPL-MCNC: 0.9 MG/DL
DIFFERENTIAL METHOD: ABNORMAL
EOSINOPHIL # BLD AUTO: 0 K/UL
EOSINOPHIL NFR BLD: 0.5 %
ERYTHROCYTE [DISTWIDTH] IN BLOOD BY AUTOMATED COUNT: 12.8 %
EST. GFR  (AFRICAN AMERICAN): >60 ML/MIN/1.73 M^2
EST. GFR  (NON AFRICAN AMERICAN): >60 ML/MIN/1.73 M^2
GLUCOSE SERPL-MCNC: 157 MG/DL
HCT VFR BLD AUTO: 41.1 %
HGB BLD-MCNC: 13.6 G/DL
INR PPP: 1
LYMPHOCYTES # BLD AUTO: 1.8 K/UL
LYMPHOCYTES NFR BLD: 33.3 %
MAGNESIUM SERPL-MCNC: 1.6 MG/DL
MCH RBC QN AUTO: 29.1 PG
MCHC RBC AUTO-ENTMCNC: 33.1 G/DL
MCV RBC AUTO: 88 FL
MONOCYTES # BLD AUTO: 0.3 K/UL
MONOCYTES NFR BLD: 6.2 %
NEUTROPHILS # BLD AUTO: 3.3 K/UL
NEUTROPHILS NFR BLD: 59.6 %
PLATELET # BLD AUTO: 197 K/UL
PMV BLD AUTO: 11.4 FL
POCT GLUCOSE: 127 MG/DL (ref 70–110)
POTASSIUM SERPL-SCNC: 3.2 MMOL/L
PROT SERPL-MCNC: 7 G/DL
PROTHROMBIN TIME: 10.4 SEC
RBC # BLD AUTO: 4.68 M/UL
SODIUM SERPL-SCNC: 140 MMOL/L
TSH SERPL DL<=0.005 MIU/L-ACNC: 0.45 UIU/ML
WBC # BLD AUTO: 5.46 K/UL

## 2018-09-12 PROCEDURE — 83735 ASSAY OF MAGNESIUM: CPT

## 2018-09-12 PROCEDURE — 84443 ASSAY THYROID STIM HORMONE: CPT

## 2018-09-12 PROCEDURE — 82962 GLUCOSE BLOOD TEST: CPT

## 2018-09-12 PROCEDURE — 99284 EMERGENCY DEPT VISIT MOD MDM: CPT | Mod: 25

## 2018-09-12 PROCEDURE — 25000003 PHARM REV CODE 250: Performed by: EMERGENCY MEDICINE

## 2018-09-12 PROCEDURE — 96360 HYDRATION IV INFUSION INIT: CPT

## 2018-09-12 PROCEDURE — 85610 PROTHROMBIN TIME: CPT

## 2018-09-12 PROCEDURE — 85025 COMPLETE CBC W/AUTO DIFF WBC: CPT

## 2018-09-12 PROCEDURE — 80053 COMPREHEN METABOLIC PANEL: CPT

## 2018-09-12 RX ORDER — MECLIZINE HYDROCHLORIDE 25 MG/1
25 TABLET ORAL
Status: COMPLETED | OUTPATIENT
Start: 2018-09-12 | End: 2018-09-12

## 2018-09-12 RX ORDER — POTASSIUM CHLORIDE 20 MEQ/1
20 TABLET, EXTENDED RELEASE ORAL
Status: COMPLETED | OUTPATIENT
Start: 2018-09-12 | End: 2018-09-12

## 2018-09-12 RX ORDER — ONDANSETRON 4 MG/1
4 TABLET, ORALLY DISINTEGRATING ORAL EVERY 6 HOURS PRN
Qty: 12 TABLET | Refills: 0 | Status: SHIPPED | OUTPATIENT
Start: 2018-09-12 | End: 2019-04-08

## 2018-09-12 RX ORDER — MECLIZINE HYDROCHLORIDE 25 MG/1
25 TABLET ORAL 3 TIMES DAILY PRN
Qty: 30 TABLET | Refills: 0 | Status: SHIPPED | OUTPATIENT
Start: 2018-09-12 | End: 2019-04-08

## 2018-09-12 RX ORDER — CLONIDINE HYDROCHLORIDE 0.1 MG/1
0.1 TABLET ORAL
Status: COMPLETED | OUTPATIENT
Start: 2018-09-12 | End: 2018-09-12

## 2018-09-12 RX ORDER — ONDANSETRON 8 MG/1
8 TABLET, ORALLY DISINTEGRATING ORAL
Status: COMPLETED | OUTPATIENT
Start: 2018-09-12 | End: 2018-09-12

## 2018-09-12 RX ADMIN — ONDANSETRON 8 MG: 8 TABLET, ORALLY DISINTEGRATING ORAL at 07:09

## 2018-09-12 RX ADMIN — SODIUM CHLORIDE 1000 ML: 0.9 INJECTION, SOLUTION INTRAVENOUS at 08:09

## 2018-09-12 RX ADMIN — MECLIZINE HYDROCHLORIDE 25 MG: 25 TABLET ORAL at 07:09

## 2018-09-12 RX ADMIN — CLONIDINE HYDROCHLORIDE 0.1 MG: 0.1 TABLET ORAL at 09:09

## 2018-09-12 RX ADMIN — POTASSIUM CHLORIDE 20 MEQ: 1500 TABLET, EXTENDED RELEASE ORAL at 09:09

## 2018-09-13 NOTE — ED PROVIDER NOTES
Encounter Date: 9/12/2018    SCRIBE #1 NOTE: I, Kasey Sanchez, am scribing for, and in the presence of,  Dr. Mcrae. I have scribed the entire note.     I, Dr. Teresa Mcrae MD, personally performed the services described in this documentation. All medical record entries made by the scribe were at my direction and in my presence.  I have reviewed the chart and agree that the record reflects my personal performance and is accurate and complete. Teresa Mcrae MD.    History     Chief Complaint   Patient presents with    Fatigue     Pt complaining of N/V, generalized weakness, dizzines. pt displays an unsteady gait. .      CHIEF COMPLAINT: Patient presents with: dizziness     HISTORY OF PRESENT ILLNESS: Romero Bowens Jr. who is a 58 y.o. presents to the emergency department today with complaint of constant dizziness that began three days ago. The patient states the dizziness worsens with movement. He reports episodes of vomiting yesterday and today and notes that his body usually feels overheated when he vomits. He states that after he vomits, he feels better, but the dizziness comes back. He does describe the room spinning. No headache. No vision changes. No chest pain, shortness of breath.     ALLERGIES REVIEWED  MEDICATIONS REVIEWED  PMH/PSH/SOC/FH REVIEWED     The history is provided by the patient.    Nursing/Ancillary staff note reviewed.          The history is provided by the patient.     Review of patient's allergies indicates:  No Known Allergies  Past Medical History:   Diagnosis Date    Cervicalgia     Diabetes mellitus     Hypercholesteremia     Hypertension     Jaw pain     Lipoma of back     Lumbago     Premature beats, unspecified     Sciatica     Scoliosis (and kyphoscoliosis), idiopathic     Undiagnosed cardiac murmurs      History reviewed. No pertinent surgical history.  History reviewed. No pertinent family history.  Social History     Tobacco Use    Smoking status: Never  Smoker    Smokeless tobacco: Never Used   Substance Use Topics    Alcohol use: No    Drug use: Yes     Types: Marijuana     Review of Systems   Constitutional: Negative for activity change, chills, diaphoresis and fever.   HENT: Negative for congestion, drooling, ear pain, rhinorrhea, sneezing, sore throat and trouble swallowing.    Eyes: Negative for pain.   Respiratory: Negative for cough, chest tightness, shortness of breath, wheezing and stridor.    Cardiovascular: Negative for chest pain, palpitations and leg swelling.   Gastrointestinal: Negative for abdominal distention, abdominal pain, constipation, diarrhea, nausea and vomiting.   Genitourinary: Negative for difficulty urinating, dysuria, frequency and urgency.   Musculoskeletal: Negative for arthralgias, back pain, myalgias, neck pain and neck stiffness.   Skin: Negative for pallor, rash and wound.   Neurological: Positive for dizziness. Negative for syncope, weakness, light-headedness, numbness and headaches.   All other systems reviewed and are negative.      Physical Exam     Initial Vitals [09/12/18 1828]   BP Pulse Resp Temp SpO2   (!) 171/117 61 19 98.3 °F (36.8 °C) 100 %      MAP       --         Physical Exam    Nursing note and vitals reviewed.  Constitutional: He appears well-developed and well-nourished. He is not diaphoretic. No distress.   HENT:   Head: Normocephalic and atraumatic.   Nose: Nose normal.   Mouth/Throat: Oropharynx is clear and moist.   Eyes: Conjunctivae and EOM are normal. Pupils are equal, round, and reactive to light. No scleral icterus.   Neck: Normal range of motion. Neck supple. No JVD present.   Cardiovascular: Normal rate and regular rhythm. Exam reveals no gallop and no friction rub.    Murmur heard.  Pulmonary/Chest: Breath sounds normal. No stridor. No respiratory distress. He has no wheezes. He exhibits no tenderness.   Abdominal: Soft. Bowel sounds are normal. He exhibits no distension and no mass. There is no  tenderness. There is no rebound and no guarding.   Musculoskeletal: Normal range of motion. He exhibits no edema or tenderness.        Cervical back: Normal.        Thoracic back: Normal.        Lumbar back: Normal.   Lymphadenopathy:     He has no cervical adenopathy.   Neurological:   Alert and oriented x 4. CN II-XII grossly intact.  Sensation intact to light touch.  No focal weakness. Strength intact 5/5 bilaterally in upper and lower extremities. Feels off balance when he walks.  No nystagmus, normal head impulse testing, normal test of skew. Normal finger to nose. Normal rapid hand movements.  Normal Romberg.  No upper or lower extremity drift.     Skin: Skin is warm and dry. No rash noted. No pallor.   Psychiatric: He has a normal mood and affect. Thought content normal.         ED Course   Procedures  Labs Reviewed   CBC W/ AUTO DIFFERENTIAL - Abnormal; Notable for the following components:       Result Value    Hemoglobin 13.6 (*)     All other components within normal limits   COMPREHENSIVE METABOLIC PANEL - Abnormal; Notable for the following components:    Potassium 3.2 (*)     Glucose 157 (*)     All other components within normal limits   POCT GLUCOSE - Abnormal; Notable for the following components:    POCT Glucose 127 (*)     All other components within normal limits   PROTIME-INR   TSH   MAGNESIUM          Imaging Results          MRI Brain Without Contrast (Final result)  Result time 09/12/18 20:30:52    Final result by France Arrington MD (09/12/18 20:30:52)                 Impression:      No acute intracranial abnormalities identified.  No evidence of acute infarction.      Electronically signed by: France Arrington MD  Date:    09/12/2018  Time:    20:30             Narrative:    EXAMINATION:  MRI BRAIN WITHOUT CONTRAST    CLINICAL HISTORY:  dizziness; Dizziness and giddiness    TECHNIQUE:  Multiplanar multisequence MR imaging of the brain was performed without  contrast.    COMPARISON:  None.    FINDINGS:  The brain is normal in contour and morphology.  No foci of abnormal parenchymal signal intensity.  No diffusion signal abnormality to suggest acute infarction.  Ventricles are normal in size and configuration without evidence for hydrocephalus.  No intracranial hemorrhage or extraaxial fluid collection.  No mass or mass effect.  Flow voids are normal in appearance.  Suspected developmental venous anomaly is incidentally visualized involving the left basal ganglia region.    Visualized paranasal sinuses and mastoid air cells are clear.  Orbits appear normal.                                 Medical Decision Making:   History:   Old Medical Records: I decided to obtain old medical records.  Initial Assessment:   Romero Bowens Jr. who is a 58 y.o. presents to the emergency department today with complaint of constant dizziness. Will obtain labs and imagining. His HINTS testing is normal but given his gait issues will obtain MRI.   Differential Diagnosis:   Central dizziness, stroke, peripheral dizziness, electrolye abnormality, hypoglycemia, hyperglycemia, migraine    Clinical Tests:   Lab Tests: Ordered and Reviewed  ED Management:  Pt had an episode of vomiting in MRI. Gave him zofran and antivert.     Pt returns from MRI, reports feeling better. Able to walk from the wheelchair to the bed and doesn't feel dizzy.     I have discussed the results of the workup with the pt. At this time no need for admission. He has a normal MRI. He has had relief with medications. Will DC him home on symptom control. After taking into careful account the historical factors and physical exam findings of the patient's presentation today, in conjunction with the empirical and objective data obtained on ED workup, no acute emergent medical condition requiring admission has been identified. The patient appears to be low risk for an emergent medical condition and I feel it is safe and appropriate  at this time for the patient to be discharged to follow-up as detailed in their discharge instructions for reevaluation and possible continued outpatient workup and management. I have discussed the specifics of the workup with the patient and the patient has verbalized understanding of the details of the workup, the diagnosis, the treatment plan, and the need for outpatient follow-up.  Although the patient has no emergent etiology today this does not preclude the development of an emergent condition so in addition, I have advised the patient that they can return to the ED and/or activate EMS at any time with worsening of their symptoms, change of their symptoms, or with any other medical complaint.  The patient remained comfortable and stable during their visit in the ED.  Discharge and follow-up instructions discussed with the patient who expressed understanding and willingness to comply with my recommendations.     This medical record was prepared using voice dictation software. There may be phonetic errors.                       ED Course as of Sep 12 2146   Wed Sep 12, 2018   2128 No sign of infarction on MRI. Pt feels improved.   [JA]      ED Course User Index  [JA] Teresa Mcrae MD     Clinical Impression:     The encounter diagnosis was Dizzinesses.      DISCHARGE                           Teresa Mcrae MD  09/12/18 2099

## 2018-09-13 NOTE — ED NOTES
Pt presents to the ED w/ c/o of generalized weakness, n/v and epigastric pain for the past 3days. Pt reports dizziness on standing and lying but increased when standing. Pt reports intermittent nausea. Reported vomiting on arrival to ED. Reports sob when he has a abd pain cramp. Pt reports abd pain as burning.

## 2018-09-13 NOTE — DISCHARGE INSTRUCTIONS
Additional instructions  Followup with your primary care physician in 2-3 days if you are not improving. Follow up for your blood pressure.  Take all your medications as prescribed. Return to the emergency department if you have increasing pain, chest pain, difficulty breathing,  nonstop vomiting, or any other concerns. Be sure to drink plenty of fluids to stay hydrated. Get plenty of rest. Please refer to additional educational material for further instructions.

## 2019-04-08 ENCOUNTER — HOSPITAL ENCOUNTER (EMERGENCY)
Facility: HOSPITAL | Age: 59
Discharge: HOME | End: 2019-04-08
Attending: EMERGENCY MEDICINE
Payer: MEDICARE

## 2019-04-08 VITALS
WEIGHT: 155 LBS | RESPIRATION RATE: 20 BRPM | DIASTOLIC BLOOD PRESSURE: 70 MMHG | HEART RATE: 62 BPM | HEIGHT: 69 IN | SYSTOLIC BLOOD PRESSURE: 160 MMHG | BODY MASS INDEX: 22.96 KG/M2 | OXYGEN SATURATION: 98 % | TEMPERATURE: 98 F

## 2019-04-08 DIAGNOSIS — S05.01XA ABRASION OF RIGHT CORNEA, INITIAL ENCOUNTER: Primary | ICD-10-CM

## 2019-04-08 PROCEDURE — 63600175 PHARM REV CODE 636 W HCPCS: Mod: HCWC | Performed by: EMERGENCY MEDICINE

## 2019-04-08 PROCEDURE — 99283 EMERGENCY DEPT VISIT LOW MDM: CPT | Mod: HCWC

## 2019-04-08 RX ORDER — PROPARACAINE HYDROCHLORIDE 5 MG/ML
2 SOLUTION/ DROPS OPHTHALMIC
Status: DISCONTINUED | OUTPATIENT
Start: 2019-04-08 | End: 2019-04-08

## 2019-04-08 RX ORDER — ERYTHROMYCIN 5 MG/G
OINTMENT OPHTHALMIC
Qty: 1 TUBE | Refills: 0 | Status: SHIPPED | OUTPATIENT
Start: 2019-04-08 | End: 2019-04-08 | Stop reason: CLARIF

## 2019-04-08 RX ORDER — INSULIN GLARGINE 100 [IU]/ML
15 INJECTION, SOLUTION SUBCUTANEOUS DAILY
COMMUNITY

## 2019-04-08 RX ORDER — ERYTHROMYCIN 5 MG/G
OINTMENT OPHTHALMIC EVERY 6 HOURS
Qty: 1 TUBE | Refills: 0 | Status: SHIPPED | OUTPATIENT
Start: 2019-04-08 | End: 2020-07-10

## 2019-04-08 RX ADMIN — FLUORESCEIN SODIUM AND BENOXINATE HYDROCHLORIDE 1 DROP: 4; 2.5 SOLUTION OPHTHALMIC at 11:04

## 2019-04-08 NOTE — ED PROVIDER NOTES
Encounter Date: 4/8/2019       History     Chief Complaint   Patient presents with    Eye Pain     Pt complains of right eye pain since yesterday. Pt reports that he was hit in the eye with a rock while weed eating and then after this, his grandson kicked him in the right eye adn his toenail scratched his eye. Right eye is swollen and red.      60 y/o male with PMH of HTN and DM presents for R eye pain after mowing the grass and a rock hitting his eye then playing with grandson and him kicking him in the R eye yesterday.  Pt has an aching pain with tearing and photophobia.  Pt denies any visual changes, HA, discharge from eye or F.     The history is provided by the patient.     Review of patient's allergies indicates:  No Known Allergies  Past Medical History:   Diagnosis Date    Cervicalgia     Diabetes mellitus     Hypercholesteremia     Hypertension     Jaw pain     Lipoma of back     Lumbago     Premature beats, unspecified     Sciatica     Scoliosis (and kyphoscoliosis), idiopathic     Undiagnosed cardiac murmurs      History reviewed. No pertinent surgical history.  History reviewed. No pertinent family history.  Social History     Tobacco Use    Smoking status: Never Smoker    Smokeless tobacco: Never Used   Substance Use Topics    Alcohol use: No    Drug use: Yes     Types: Marijuana     Review of Systems   Constitutional: Negative for fever.   Eyes: Positive for photophobia, pain and redness. Negative for discharge and visual disturbance.   Gastrointestinal: Negative for nausea and vomiting.   Skin: Negative for rash and wound.   Neurological: Negative for headaches.   Hematological: Does not bruise/bleed easily.   All other systems reviewed and are negative.      Physical Exam     Initial Vitals [04/08/19 1040]   BP Pulse Resp Temp SpO2   (!) 181/84 67 20 98 °F (36.7 °C) 98 %      MAP       --         Physical Exam    Nursing note and vitals reviewed.  Constitutional: Vital signs are  normal. He appears well-developed and well-nourished. He is cooperative.  Non-toxic appearance. He does not appear ill. No distress.   HENT:   Head: Atraumatic.   Nose: Nose normal.   Mouth/Throat: Mucous membranes are normal.   Eyes: EOM and lids are normal. Pupils are equal, round, and reactive to light. Right eye exhibits no chemosis and no discharge. No foreign body present in the right eye. Right conjunctiva is injected. Right conjunctiva has no hemorrhage.   Slit lamp exam:       The right eye shows corneal abrasion. The right eye shows no foreign body and no hyphema.       Neck: Neck supple.   Cardiovascular: Normal rate and regular rhythm.   Pulmonary/Chest: Effort normal.   Abdominal: Normal appearance. There is no tenderness.   Musculoskeletal: Normal range of motion.   Neurological: He is alert and oriented to person, place, and time. He has normal strength. No cranial nerve deficit or sensory deficit.   Skin: Skin is warm, dry and intact. Capillary refill takes less than 2 seconds. No rash noted.   Psychiatric: He has a normal mood and affect. His speech is normal and behavior is normal.         ED Course   Procedures  Labs Reviewed - No data to display       Imaging Results    None          Medical Decision Making:   Initial Assessment:   Pt presents for R eye pain after mowing the grass and a rock hitting his eye then playing with grandson and him kicking him in the R eye yesterday.  No contact lens use.   + for photophobia, injected and tearing, denies visual changes or HA or any d/c   Will exam under fluorescein and slit lamp  Differential Diagnosis:   Corneal abrasion, FB, subconjunctival hemorrhage, conjunctivitis   ED Management:  R corneal abrasion visualized with slit lamp, no FB.  Erythromycin ointment rx, pt can patch if it helps with pain and photophobia.  Pt to f/u with Opthalmology this week.   Pt to return to ER for any concerns, a worsening or change in current condition.  Pt and wife  verbalized understanding of all d/c instructions.     Other:   I have discussed this case with another health care provider.                      Clinical Impression:       ICD-10-CM ICD-9-CM   1. Abrasion of right cornea, initial encounter S05.01XA 918.1                                Gordon Kaur NP  04/09/19 1727

## 2019-04-25 ENCOUNTER — HOSPITAL ENCOUNTER (EMERGENCY)
Facility: HOSPITAL | Age: 59
Discharge: HOME | End: 2019-04-26
Payer: MEDICARE

## 2019-04-25 DIAGNOSIS — E16.0 HYPOGLYCEMIA DUE TO INSULIN: Primary | ICD-10-CM

## 2019-04-25 DIAGNOSIS — T38.3X5A HYPOGLYCEMIA DUE TO INSULIN: Primary | ICD-10-CM

## 2019-04-25 LAB — POCT GLUCOSE: 70 MG/DL (ref 70–110)

## 2019-04-25 PROCEDURE — 82962 GLUCOSE BLOOD TEST: CPT | Mod: HCWC,91

## 2019-04-25 PROCEDURE — 99282 EMERGENCY DEPT VISIT SF MDM: CPT | Mod: HCWC

## 2019-04-26 VITALS
TEMPERATURE: 96 F | BODY MASS INDEX: 21.48 KG/M2 | OXYGEN SATURATION: 96 % | HEART RATE: 70 BPM | SYSTOLIC BLOOD PRESSURE: 103 MMHG | HEIGHT: 69 IN | RESPIRATION RATE: 20 BRPM | DIASTOLIC BLOOD PRESSURE: 54 MMHG | WEIGHT: 145 LBS

## 2019-04-26 LAB — POCT GLUCOSE: 87 MG/DL (ref 70–110)

## 2019-04-26 NOTE — ED PROVIDER NOTES
Encounter Date: 4/25/2019    SCRIBE #1 NOTE: I, Michelle Ahumada, am scribing for, and in the presence of,  Dr. Kaba. I have scribed the entire note.       History     Chief Complaint   Patient presents with    Dizziness     States he took his blood sugar at home and it was 68.  States he was feeling dizzy and drowsy.     Time seen by provider: 11:00 PM    This is a 59 y.o. male who presents to ED with complaint of dizziness and lightheadedness earlier tonight after his CBG was low. Patient reports to have only eaten breakfast and dinner when he suddenly felt dizzy and his vision became blurry. His CBG at home was 68. Denies to have eaten anything when he noticed his CBG was low. He denies any LOC or any other concerning symptoms. Patient takes insulin and metformin but does not have glucose tablets. Denies any ETOH consumption. No other palliative or provocative factors except as noted.    The history is provided by the patient.     Review of patient's allergies indicates:  No Known Allergies  Past Medical History:   Diagnosis Date    Cervicalgia     Diabetes mellitus     Hypercholesteremia     Hypertension     Jaw pain     Lipoma of back     Lumbago     Premature beats, unspecified     Sciatica     Scoliosis (and kyphoscoliosis), idiopathic     Undiagnosed cardiac murmurs      No past surgical history on file.  No family history on file.  Social History     Tobacco Use    Smoking status: Never Smoker    Smokeless tobacco: Never Used   Substance Use Topics    Alcohol use: No    Drug use: Yes     Types: Marijuana     Review of Systems   All other systems reviewed and are negative.      Physical Exam     Initial Vitals [04/25/19 2254]   BP Pulse Resp Temp SpO2   (!) 110/58 75 18 97.9 °F (36.6 °C) 97 %      MAP       --         Physical Exam    Nursing note and vitals reviewed.  Constitutional: He appears well-developed.   HENT:   Head: Normocephalic and atraumatic.   Eyes: EOM are normal.   Neck:  Neck supple.   Pulmonary/Chest: No respiratory distress.   Abdominal: He exhibits no distension.   Musculoskeletal:   No acute deformity   Neurological:   Intact to screening   Skin: Skin is dry.   Psychiatric: He has a normal mood and affect.         ED Course   Procedures  Labs Reviewed   POCT GLUCOSE   POCT GLUCOSE   POCT GLUCOSE MONITORING CONTINUOUS             Medical Decision Making:   Clinical Tests:   Lab Tests: Ordered and Reviewed  ED Management:  0025: On re-evaluation, patient is happy and comfortable. Requesting to be discharged. Patient's glucose is 87 after eating a full meal. Discussed management for hypoglycemia with patient.  Patient is nontoxic appearing in the ED.  No persistent emergent issues detected.  Exam benign. Patient will return to the ED as needed for any deterioration or any other concerns.  Verbal discharge instructions and return precautions given.  We will discharge home to follow up with primary physician.                      Clinical Impression:     1. Hypoglycemia due to insulin        Disposition:   Disposition: Discharged  Condition: Stable     I personally performed the services described in this documentation. All medical record entries made by the scribe were at my direction and in my presence.  I have reviewed the chart and agree that the record reflects my personal performance and is accurate and complete within the limitations of emergency medical charting.   --Rafael Kaba M.D. 1:03 AM 04/26/2019         Rafael Kaba MD  04/26/19 0103

## 2019-06-10 ENCOUNTER — OFFICE VISIT (OUTPATIENT)
Dept: GASTROENTEROLOGY | Facility: CLINIC | Age: 59
End: 2019-06-10
Payer: MEDICARE

## 2019-06-10 VITALS — BODY MASS INDEX: 21.75 KG/M2 | WEIGHT: 147.25 LBS

## 2019-06-10 DIAGNOSIS — Z12.11 SCREENING FOR MALIGNANT NEOPLASM OF COLON: Primary | ICD-10-CM

## 2019-06-10 DIAGNOSIS — D64.9 ANEMIA, UNSPECIFIED TYPE: ICD-10-CM

## 2019-06-10 PROCEDURE — 99203 OFFICE O/P NEW LOW 30 MIN: CPT | Mod: HCNC,S$GLB,, | Performed by: INTERNAL MEDICINE

## 2019-06-10 PROCEDURE — 3008F BODY MASS INDEX DOCD: CPT | Mod: HCNC,CPTII,S$GLB, | Performed by: INTERNAL MEDICINE

## 2019-06-10 PROCEDURE — 99999 PR PBB SHADOW E&M-EST. PATIENT-LVL III: ICD-10-PCS | Mod: PBBFAC,HCNC,, | Performed by: INTERNAL MEDICINE

## 2019-06-10 PROCEDURE — 99203 PR OFFICE/OUTPT VISIT, NEW, LEVL III, 30-44 MIN: ICD-10-PCS | Mod: HCNC,S$GLB,, | Performed by: INTERNAL MEDICINE

## 2019-06-10 PROCEDURE — 99999 PR PBB SHADOW E&M-EST. PATIENT-LVL III: CPT | Mod: PBBFAC,HCNC,, | Performed by: INTERNAL MEDICINE

## 2019-06-10 PROCEDURE — 3008F PR BODY MASS INDEX (BMI) DOCUMENTED: ICD-10-PCS | Mod: HCNC,CPTII,S$GLB, | Performed by: INTERNAL MEDICINE

## 2019-06-10 RX ORDER — SODIUM, POTASSIUM,MAG SULFATES 17.5-3.13G
1 SOLUTION, RECONSTITUTED, ORAL ORAL DAILY
Qty: 1 KIT | Refills: 0 | Status: SHIPPED | OUTPATIENT
Start: 2019-06-10 | End: 2019-06-12

## 2019-06-10 NOTE — PROGRESS NOTES
Subjective:       Patient ID: Romero Bowens Jr. is a 59 y.o. male.    Chief Complaint: Colonoscopy    This is a 59-year-old male here to evaluate for colon cancer screening.  He has a his family history of colon cancer in his mother but at age greater than 60.  He has never had a colonoscopy.  He has noted a mild anemia with a hemoglobin less and 14, this is chronic and normocytic and mild.  It has actually improved over the past year.  No overt GI bleeding.  No other exacerbating or relieving factors or other associated symptoms. No current GI symptoms.     The following portions of the patient's history were reviewed and updated as appropriate: allergies, current medications, past family history, past medical history, past social history, past surgical history and problem list.    (Portions of this note were dictated using voice recognition software and may contain dictation related errors in spelling/grammar/syntax not found on text review)    HPI  Review of Systems   Constitutional: Negative for appetite change and unexpected weight change.   Respiratory: Negative for apnea and chest tightness.    Cardiovascular: Negative for chest pain and palpitations.   Gastrointestinal: Negative for abdominal distention, abdominal pain and blood in stool.       Objective:      Physical Exam   Constitutional: He is oriented to person, place, and time. He appears well-developed and well-nourished. No distress.   HENT:   Head: Normocephalic.   Eyes: Conjunctivae are normal. No scleral icterus.   Neck: No tracheal deviation present. No thyromegaly present.   Cardiovascular: Normal rate, regular rhythm and normal heart sounds. Exam reveals no gallop and no friction rub.   No murmur heard.  Pulmonary/Chest: Effort normal and breath sounds normal. He has no wheezes. He has no rales.   Abdominal: Soft. Bowel sounds are normal. He exhibits no distension. There is no tenderness. There is no rebound and no guarding.   Musculoskeletal:  Normal range of motion. He exhibits no edema or tenderness.   Neurological: He is alert and oriented to person, place, and time.   Skin: He is not diaphoretic.   Psychiatric: He has a normal mood and affect. His behavior is normal.       Assessment:       1. Screening for malignant neoplasm of colon    2. Anemia, unspecified type        Plan:   1. Average risk screening colonoscopy

## 2019-06-10 NOTE — PATIENT INSTRUCTIONS
SUPREP Instructions    You are scheduled for a colonoscopy with Dr. Reyes  on 7/26/19 at [facility]  To ensure that your test is accurate and complete, you MUST follow these instructions listed below.  If you have any questions, please call our office at [phone].  Plan on being at the hospital for your procedure for 3-4 hours.    1.  Follow a CLEAR LIQUID DIET for the entire day before your scheduled colonoscopy.  This means no solid food the entire day starting when you wake.  You may have as much of the clear liquids as you want throughout the day.   CLEAR LIQUID DIET:   - Avoid Red, Orange, Purple, and/or Blue food coloring   - NO DAIRY   - You can have:  Coffee with sugar (no creamer), tea, water, soda, apple or white grape juice, chicken or beef broth/bouillon (no meat, noodles, or veggies), green/yellow popsicles, green/yellow Jell-O, lemonade.    2.  AT 5 pm the evening before your colonoscopy, POUR ONE (1) BOTTLE OF SUPREP INTO THE MIXING CONTAINER, PROVIDED INSIDE THE BOX.  ADD WATER TO THE LINE ON THE CONTAINER AND MIX IT WELL.  DRINK THE ENTIRE CONTAINER AND THEN DRINK TWO (2) MORE CONTAINERS OF WATER OVER THE NEXT 1 HOUR.  This is sometimes easier to drink if this solution is cold, so you can mix the solution a few hours ahead of time and place in the refrigerator prior to drinking.  You have to drink the solution within 24 hours of mixing it.  Do NOT put this solution over ice.  It IS ok to drink with a straw.    3.  The endoscopy department will call you 2 days before your colonoscopy to tell you the exact time to arrive, AND to tell you the exact time to drink the 2nd portion of your prep (which will be FIVE HOURS BEFORE YOUR ARRIVAL TIME).  At this time given to you, POUR ONE (1) BOTTLE OF SUPREP INTO THE MIXING CONTAINER, PROVIDED INSIDE THE BOX.  ADD WATER TO THE LINE ON THE CONTAINER AND MIX IT WELL.  DRINK THE ENTIRE CONTAINER AND THEN DRINK TWO (2) MORE CONTAINERS OF WATER OVER THE  NEXT 1 HOUR.  This is sometimes easier to drink if this solution is cold, so you can mix the solution a few hours ahead of time and place in the refrigerator prior to drinking.  You have to drink the solution within 24 hours of mixing it.  Do NOT put this solution over ice.  It IS ok to drink with a straw. Once this is complete, you may not have ANYTHING else by mouth!    4.  You must have someone with you to DRIVE YOU HOME since you will be receiving IV sedation for the colonoscopy.    5.  It is ok to take your heart, blood pressure, and seizure medications in the morning of your test with a SIP of water.  Hold other medications until after your procedure.  Do NOT have anything else to eat or drink the morning of your colonoscopy.  It is ok to brush your teeth.    6.  If you are on blood thinners THAT YOU HAVE BEEN INSTRUCTED TO HOLD BY YOUR DOCTOR FOR THIS PROCEDURE, then do NOT take this the morning of your colonoscopy.  Do NOT stop these medications on your own, they must be approved to be held by your doctor.  Your colonoscopy can NOT be done if you are on these medications.  Examples of blood thinners include: Coumadin, Aggrenox, Plavix, Pradaxa, Reapro, Pletal, Xarelto, Ticagrelor, Brilinta, Eliquis, and high dose aspirin (325 mg).  You do not have to stop baby aspirin 81 mg.    7.  IF YOU ARE DIABETIC:  NO INSULIN OR ORAL MEDICATIONS THE MORNING OF THE COLONOSCOPY.  TAKE ONLY HALF THE DOSE OF YOUR INSULIN THE DAY BEFORE THE COLONOSCOPY.  DO NOT TAKE ANY ORAL DIABETIC MEDICATIONS THE DAY BEFORE THE COLONOSCOPY.  IF YOU ARE AN INSULIN DEPENDENT DIABETIC WITH UNSTABLE BLOOD SUGARDS, NOTIFY YOUR PRIMARY CARE PHYSICIAN FOR INSTRUCTIONS.

## 2019-06-10 NOTE — LETTER
Magali 10, 2019      Dave Donaldson NP  [address]  [address]  [facility] [address]           [place] [facility]  [address] [facility] [address]  Phone: [phone]          Patient: Romero Bowens Jr.   MR Number: [id number]   YOB: 1960   Date of Visit: 6/10/2019       Dear Dave Donaldson:    Thank you for referring Romero Bowens to me for evaluation. Attached you will find relevant portions of my assessment and plan of care.    If you have questions, please do not hesitate to call me. I look forward to following Romero Bowens along with you.    Sincerely,    Scott Reyes MD    Enclosure  CC:  No Recipients    If you would like to receive this communication electronically, please contact [email or web address] or [phone] to request more information on [facility] access.    For providers and/or their staff who would like to refer a patient to [facility], please contact us through our one-stop-shop provider referral line, [facility] , at [phone].    If you feel you have received this communication in error or would no longer like to receive these types of communications, please e-mail [email or web address]

## 2019-07-24 ENCOUNTER — TELEPHONE (OUTPATIENT)
Dept: ENDOSCOPY | Facility: HOSPITAL | Age: 59
End: 2019-07-24

## 2019-07-24 NOTE — TELEPHONE ENCOUNTER
Left message instructing patient to call dept @ [phone] between 8am-4pm.    Arrival time to be given @105

## 2019-07-25 ENCOUNTER — TELEPHONE (OUTPATIENT)
Dept: ENDOSCOPY | Facility: HOSPITAL | Age: 59
End: 2019-07-25

## 2019-07-25 NOTE — TELEPHONE ENCOUNTER
Spoke with patient about arrival time @ 0915.     Prep instructions reviewed: the day before the procedure, follow a clear liquid diet all day, then start the first 1/2 of prep at 5pm and take 2nd 1/2 of prep @ 0315.  Pt must be completely NPO when prep completed @ 0515.              Medications: Do not take Insulin or oral diabetic medications the day of the procedure.  Take as prescribed: heart, seizure and blood pressure medication in the morning with a sip of water (less than an ounce).  Take any breathing medications and bring inhalers to hospital with you Leave all valuables and jewelry at home.     Wear comfortable clothes to procedure to change into hospital gown You cannot drive for 24 hours after your procedure because you will receive sedation for your procedure to make you comfortable.  A ride must be provided at discharge.

## 2019-08-09 ENCOUNTER — HOSPITAL ENCOUNTER (OUTPATIENT)
Facility: HOSPITAL | Age: 59
Discharge: HOME | End: 2019-08-09
Attending: EMERGENCY MEDICINE | Admitting: HOSPITALIST
Payer: MEDICARE

## 2019-08-09 VITALS
HEART RATE: 73 BPM | SYSTOLIC BLOOD PRESSURE: 161 MMHG | RESPIRATION RATE: 18 BRPM | OXYGEN SATURATION: 99 % | WEIGHT: 145 LBS | BODY MASS INDEX: 21.48 KG/M2 | TEMPERATURE: 99 F | HEIGHT: 69 IN | DIASTOLIC BLOOD PRESSURE: 79 MMHG

## 2019-08-09 DIAGNOSIS — N17.9 ACUTE KIDNEY INJURY: Primary | ICD-10-CM

## 2019-08-09 DIAGNOSIS — R53.83 FATIGUE: ICD-10-CM

## 2019-08-09 PROBLEM — E11.9 TYPE 2 DIABETES MELLITUS WITHOUT COMPLICATION, WITH LONG-TERM CURRENT USE OF INSULIN: Status: ACTIVE | Noted: 2019-08-09

## 2019-08-09 PROBLEM — I10 HYPERTENSION: Status: ACTIVE | Noted: 2019-08-09

## 2019-08-09 PROBLEM — I10 HYPERTENSION: Status: RESOLVED | Noted: 2019-08-09 | Resolved: 2019-08-09

## 2019-08-09 PROBLEM — Z79.4 TYPE 2 DIABETES MELLITUS WITHOUT COMPLICATION, WITH LONG-TERM CURRENT USE OF INSULIN: Status: ACTIVE | Noted: 2019-08-09

## 2019-08-09 LAB
ALBUMIN SERPL BCP-MCNC: 4 G/DL (ref 3.5–5.2)
ALP SERPL-CCNC: 59 U/L (ref 55–135)
ALT SERPL W/O P-5'-P-CCNC: 31 U/L (ref 10–44)
ANION GAP SERPL CALC-SCNC: 14 MMOL/L (ref 8–16)
ANION GAP SERPL CALC-SCNC: 6 MMOL/L (ref 8–16)
ANION GAP SERPL CALC-SCNC: 8 MMOL/L (ref 8–16)
AST SERPL-CCNC: 25 U/L (ref 10–40)
BASOPHILS # BLD AUTO: 0.01 K/UL (ref 0–0.2)
BASOPHILS NFR BLD: 0.1 % (ref 0–1.9)
BILIRUB SERPL-MCNC: 0.3 MG/DL (ref 0.1–1)
BILIRUB UR QL STRIP: NEGATIVE
BUN SERPL-MCNC: 20 MG/DL (ref 8–23)
BUN SERPL-MCNC: 25 MG/DL (ref 8–23)
BUN SERPL-MCNC: 30 MG/DL (ref 8–23)
CALCIUM SERPL-MCNC: 9.2 MG/DL (ref 8.7–10.5)
CALCIUM SERPL-MCNC: 9.3 MG/DL (ref 8.7–10.5)
CALCIUM SERPL-MCNC: 9.5 MG/DL (ref 8.7–10.5)
CHLORIDE SERPL-SCNC: 102 MMOL/L (ref 95–110)
CHLORIDE SERPL-SCNC: 103 MMOL/L (ref 95–110)
CHLORIDE SERPL-SCNC: 105 MMOL/L (ref 95–110)
CK SERPL-CCNC: 179 U/L (ref 20–200)
CLARITY UR: CLEAR
CO2 SERPL-SCNC: 22 MMOL/L (ref 23–29)
CO2 SERPL-SCNC: 28 MMOL/L (ref 23–29)
CO2 SERPL-SCNC: 28 MMOL/L (ref 23–29)
COLOR UR: YELLOW
CREAT SERPL-MCNC: 1.1 MG/DL (ref 0.5–1.4)
CREAT SERPL-MCNC: 1.5 MG/DL (ref 0.5–1.4)
CREAT SERPL-MCNC: 2.2 MG/DL (ref 0.5–1.4)
DIFFERENTIAL METHOD: ABNORMAL
EOSINOPHIL # BLD AUTO: 0.1 K/UL (ref 0–0.5)
EOSINOPHIL NFR BLD: 1.2 % (ref 0–8)
ERYTHROCYTE [DISTWIDTH] IN BLOOD BY AUTOMATED COUNT: 13.7 % (ref 11.5–14.5)
EST. GFR  (AFRICAN AMERICAN): 36 ML/MIN/1.73 M^2
EST. GFR  (AFRICAN AMERICAN): 57 ML/MIN/1.73 M^2
EST. GFR  (AFRICAN AMERICAN): >60 ML/MIN/1.73 M^2
EST. GFR  (NON AFRICAN AMERICAN): 31 ML/MIN/1.73 M^2
EST. GFR  (NON AFRICAN AMERICAN): 50 ML/MIN/1.73 M^2
EST. GFR  (NON AFRICAN AMERICAN): >60 ML/MIN/1.73 M^2
ESTIMATED AVG GLUCOSE: 169 MG/DL (ref 68–131)
GLUCOSE SERPL-MCNC: 115 MG/DL (ref 70–110)
GLUCOSE SERPL-MCNC: 144 MG/DL (ref 70–110)
GLUCOSE SERPL-MCNC: 206 MG/DL (ref 70–110)
GLUCOSE UR QL STRIP: NEGATIVE
HBA1C MFR BLD HPLC: 7.5 % (ref 4–5.6)
HCT VFR BLD AUTO: 42 % (ref 40–54)
HGB BLD-MCNC: 13.9 G/DL (ref 14–18)
HGB UR QL STRIP: NEGATIVE
KETONES UR QL STRIP: NEGATIVE
LEUKOCYTE ESTERASE UR QL STRIP: NEGATIVE
LYMPHOCYTES # BLD AUTO: 3.2 K/UL (ref 1–4.8)
LYMPHOCYTES NFR BLD: 43.7 % (ref 18–48)
MCH RBC QN AUTO: 29.5 PG (ref 27–31)
MCHC RBC AUTO-ENTMCNC: 33.1 G/DL (ref 32–36)
MCV RBC AUTO: 89 FL (ref 82–98)
MONOCYTES # BLD AUTO: 0.5 K/UL (ref 0.3–1)
MONOCYTES NFR BLD: 6.4 % (ref 4–15)
NEUTROPHILS # BLD AUTO: 3.6 K/UL (ref 1.8–7.7)
NEUTROPHILS NFR BLD: 48.6 % (ref 38–73)
NITRITE UR QL STRIP: NEGATIVE
PH UR STRIP: 5 [PH] (ref 5–8)
PLATELET # BLD AUTO: 187 K/UL (ref 150–350)
PMV BLD AUTO: 11.6 FL (ref 9.2–12.9)
POCT GLUCOSE: 132 MG/DL (ref 70–110)
POCT GLUCOSE: 144 MG/DL (ref 70–110)
POCT GLUCOSE: 214 MG/DL (ref 70–110)
POTASSIUM SERPL-SCNC: 3.9 MMOL/L (ref 3.5–5.1)
POTASSIUM SERPL-SCNC: 4.3 MMOL/L (ref 3.5–5.1)
POTASSIUM SERPL-SCNC: 4.4 MMOL/L (ref 3.5–5.1)
PROT SERPL-MCNC: 6.9 G/DL (ref 6–8.4)
PROT UR QL STRIP: NEGATIVE
RBC # BLD AUTO: 4.71 M/UL (ref 4.6–6.2)
SODIUM SERPL-SCNC: 138 MMOL/L (ref 136–145)
SODIUM SERPL-SCNC: 139 MMOL/L (ref 136–145)
SODIUM SERPL-SCNC: 139 MMOL/L (ref 136–145)
SP GR UR STRIP: >=1.03 (ref 1–1.03)
URN SPEC COLLECT METH UR: ABNORMAL
UROBILINOGEN UR STRIP-ACNC: NEGATIVE EU/DL
WBC # BLD AUTO: 7.35 K/UL (ref 3.9–12.7)

## 2019-08-09 PROCEDURE — G0378 HOSPITAL OBSERVATION PER HR: HCPCS

## 2019-08-09 PROCEDURE — 80048 BASIC METABOLIC PNL TOTAL CA: CPT

## 2019-08-09 PROCEDURE — 82550 ASSAY OF CK (CPK): CPT

## 2019-08-09 PROCEDURE — 85025 COMPLETE CBC W/AUTO DIFF WBC: CPT

## 2019-08-09 PROCEDURE — 94761 N-INVAS EAR/PLS OXIMETRY MLT: CPT

## 2019-08-09 PROCEDURE — 82962 GLUCOSE BLOOD TEST: CPT

## 2019-08-09 PROCEDURE — 80053 COMPREHEN METABOLIC PANEL: CPT

## 2019-08-09 PROCEDURE — 81003 URINALYSIS AUTO W/O SCOPE: CPT

## 2019-08-09 PROCEDURE — S5571 INSULIN DISPOS PEN 3 ML: HCPCS | Performed by: NURSE PRACTITIONER

## 2019-08-09 PROCEDURE — 83036 HEMOGLOBIN GLYCOSYLATED A1C: CPT

## 2019-08-09 PROCEDURE — 63600175 PHARM REV CODE 636 W HCPCS: Performed by: NURSE PRACTITIONER

## 2019-08-09 PROCEDURE — 80048 BASIC METABOLIC PNL TOTAL CA: CPT | Mod: 91

## 2019-08-09 PROCEDURE — 96372 THER/PROPH/DIAG INJ SC/IM: CPT | Mod: 59 | Performed by: EMERGENCY MEDICINE

## 2019-08-09 PROCEDURE — 63600175 PHARM REV CODE 636 W HCPCS: Performed by: EMERGENCY MEDICINE

## 2019-08-09 PROCEDURE — 99285 EMERGENCY DEPT VISIT HI MDM: CPT | Mod: 25

## 2019-08-09 PROCEDURE — 96360 HYDRATION IV INFUSION INIT: CPT

## 2019-08-09 PROCEDURE — 36415 COLL VENOUS BLD VENIPUNCTURE: CPT

## 2019-08-09 RX ORDER — INSULIN ASPART 100 [IU]/ML
0-5 INJECTION, SOLUTION INTRAVENOUS; SUBCUTANEOUS
Status: DISCONTINUED | OUTPATIENT
Start: 2019-08-09 | End: 2019-08-09 | Stop reason: HOSPADM

## 2019-08-09 RX ORDER — GLUCAGON 1 MG
1 KIT INJECTION
Status: DISCONTINUED | OUTPATIENT
Start: 2019-08-09 | End: 2019-08-09 | Stop reason: HOSPADM

## 2019-08-09 RX ORDER — SIMVASTATIN 20 MG/1
40 TABLET, FILM COATED ORAL NIGHTLY
Status: DISCONTINUED | OUTPATIENT
Start: 2019-08-09 | End: 2019-08-09 | Stop reason: HOSPADM

## 2019-08-09 RX ORDER — ACETAMINOPHEN 325 MG/1
650 TABLET ORAL EVERY 4 HOURS PRN
Status: DISCONTINUED | OUTPATIENT
Start: 2019-08-09 | End: 2019-08-09 | Stop reason: HOSPADM

## 2019-08-09 RX ORDER — IBUPROFEN 200 MG
24 TABLET ORAL
Status: DISCONTINUED | OUTPATIENT
Start: 2019-08-09 | End: 2019-08-09 | Stop reason: HOSPADM

## 2019-08-09 RX ORDER — IBUPROFEN 200 MG
16 TABLET ORAL
Status: DISCONTINUED | OUTPATIENT
Start: 2019-08-09 | End: 2019-08-09 | Stop reason: HOSPADM

## 2019-08-09 RX ORDER — SODIUM CHLORIDE 0.9 % (FLUSH) 0.9 %
10 SYRINGE (ML) INJECTION
Status: DISCONTINUED | OUTPATIENT
Start: 2019-08-09 | End: 2019-08-09 | Stop reason: HOSPADM

## 2019-08-09 RX ORDER — SODIUM CHLORIDE 9 MG/ML
INJECTION, SOLUTION INTRAVENOUS CONTINUOUS
Status: DISCONTINUED | OUTPATIENT
Start: 2019-08-09 | End: 2019-08-09 | Stop reason: HOSPADM

## 2019-08-09 RX ORDER — ONDANSETRON 2 MG/ML
4 INJECTION INTRAMUSCULAR; INTRAVENOUS EVERY 8 HOURS PRN
Status: DISCONTINUED | OUTPATIENT
Start: 2019-08-09 | End: 2019-08-09 | Stop reason: HOSPADM

## 2019-08-09 RX ADMIN — INSULIN DETEMIR 10 UNITS: 100 INJECTION, SOLUTION SUBCUTANEOUS at 08:08

## 2019-08-09 RX ADMIN — SODIUM CHLORIDE 1000 ML: 0.9 INJECTION, SOLUTION INTRAVENOUS at 01:08

## 2019-08-09 RX ADMIN — SODIUM CHLORIDE: 0.9 INJECTION, SOLUTION INTRAVENOUS at 05:08

## 2019-08-09 NOTE — H&P
[facility]  History & Physical    Patient Name: Romero Bowens Jr.  MRN: [id number]  Admission Date: 8/9/2019  Attending Physician: Florian Enriquez DO   Primary Care Provider: Dave Donaldson NP         Patient information was obtained from patient, past medical records and ER records.     Subjective:     Principal Problem:Acute kidney injury    Chief Complaint:   Chief Complaint   Patient presents with    Fatigue     present X days, reports has been working outside. reports drinking water throughout the day, but is still having weakness with dry mouth. pt denies falling with weakness, reports decreased appetite.         HPI: Mr. Romero Bowens is a 60 y/o male who lives in [place], [place] at his residence. The patients PCP is Dr. Rayna Chan. The patient is also being followed by Dr. Scott Reyes with GI. The patient has a past medical history of Diabetes mellitus, hypertension, cervicalgia, hypercholesteremia, jaw pain, lipoma of back, lumbago, premature beats, sciatica, scoliosis (and kyphoscoliosis), and cardiac murmurs. No past surgical history noted. The patient denies smoking cigarettes, drinking alcohol , but endorses marijuana use.     The patient presents to [facility] with complaints of  generalized weakness for the last few days. Patient has been working in his yard for the last few days. He suspects that heat exposure is exacerbating symptoms. Associated symptoms include dry mouth, decreased appetite, and one episode of vomiting. Patient reports that weakness is worse with position changes. He denies fever, chills, cough/cold symptoms, SOB, chest pain, N/V/D, abdominal pain, numbness/tingling, weakness or any other concerning symptoms.    The ED workup shows a stable hemoglobin---13.9, BUN---25, Cr---1.6, glucose---144 . Patient received IV fluid bolus in the ED and NS @ 75 ml/hr. Admitted to CDU for observation.            Past Medical History:    Diagnosis Date    Cervicalgia     Diabetes mellitus     Hypercholesteremia     Hypertension     Jaw pain     Lipoma of back     Lumbago     Premature beats, unspecified     Sciatica     Scoliosis (and kyphoscoliosis), idiopathic     Undiagnosed cardiac murmurs        History reviewed. No pertinent surgical history.    Review of patient's allergies indicates:  No Known Allergies    No current facility-administered medications on file prior to encounter.      Current Outpatient Medications on File Prior to Encounter   Medication Sig    insulin (LANTUS SOLOSTAR U-100 INSULIN) glargine 100 units/mL (3mL) SubQ pen Inject 18 Units into the skin once daily.    pravastatin (PRAVACHOL) 10 MG tablet Take 10 mg by mouth once daily.    simvastatin (ZOCOR) 40 MG tablet Take 1 tablet (40 mg total) by mouth every evening.    erythromycin (ROMYCIN) ophthalmic ointment Place into the right eye every 6 (six) hours. For 5 days    metformin (GLUCOPHAGE) 1000 MG tablet Take 1,000 mg by mouth 2 (two) times daily.     Family History     None        Tobacco Use    Smoking status: Never Smoker    Smokeless tobacco: Never Used   Substance and Sexual Activity    Alcohol use: No    Drug use: Yes     Types: Marijuana    Sexual activity: Not on file     Review of Systems   Constitutional: Positive for activity change, appetite change and fatigue. Negative for chills, diaphoresis and fever.   HENT: Negative for trouble swallowing.    Eyes: Negative for visual disturbance.   Respiratory: Negative for cough, shortness of breath and wheezing.    Cardiovascular: Negative for chest pain, palpitations and leg swelling.   Gastrointestinal: Negative for abdominal distention, abdominal pain, blood in stool, constipation, diarrhea, nausea and vomiting.   Genitourinary: Negative for difficulty urinating and hematuria.   Musculoskeletal: Negative for arthralgias, back pain, gait problem and myalgias.   Skin: Negative for color change,  pallor, rash and wound.   Neurological: Negative for dizziness, tremors, seizures, syncope, speech difficulty, weakness and light-headedness.   Hematological: Does not bruise/bleed easily.   Psychiatric/Behavioral: Negative for agitation, confusion and hallucinations. The patient is not nervous/anxious.      Objective:     Vital Signs (Most Recent):  Temp: 97.7 °F (36.5 °C) (08/09/19 0505)  Pulse: (!) 57 (08/09/19 0749)  Resp: 18 (08/09/19 0505)  BP: 121/66 (08/09/19 0505)  SpO2: 99 % (08/09/19 0505) Vital Signs (24h Range):  Temp:  [97.6 °F (36.4 °C)-98.5 °F (36.9 °C)] 97.7 °F (36.5 °C)  Pulse:  [55-97] 57  Resp:  [15-21] 18  SpO2:  [98 %-100 %] 99 %  BP: ()/(50-66) 121/66     Weight: 65.8 kg (145 lb)  Body mass index is 21.41 kg/m².    Physical Exam   Constitutional: He is oriented to person, place, and time. He appears well-developed and well-nourished. No distress.   HENT:   Head: Normocephalic and atraumatic.   Eyes: Pupils are equal, round, and reactive to light. Conjunctivae and EOM are normal.   Neck: Normal range of motion. Neck supple. No JVD present.   Cardiovascular: Normal rate, regular rhythm, normal heart sounds and intact distal pulses.   No murmur heard.  Pulmonary/Chest: Effort normal and breath sounds normal. No respiratory distress. He has no wheezes.   Abdominal: Soft. Bowel sounds are normal. He exhibits no distension. There is no tenderness. There is no guarding.   Musculoskeletal: Normal range of motion. He exhibits no edema or tenderness.   Neurological: He is alert and oriented to person, place, and time.   Skin: Skin is warm and dry. Capillary refill takes less than 2 seconds. No rash noted. He is not diaphoretic. No erythema. No pallor.   Psychiatric: He has a normal mood and affect. His behavior is normal. Judgment and thought content normal.   Nursing note and vitals reviewed.        CRANIAL NERVES     CN III, IV, VI   Pupils are equal, round, and reactive to light.  Extraocular  motions are normal.        Significant Labs:   A1C:   Recent Labs   Lab 08/09/19  0704   HGBA1C 7.5*     CBC:   Recent Labs   Lab 08/09/19 0137   WBC 7.35   HGB 13.9*   HCT 42.0        CMP:   Recent Labs   Lab 08/09/19  0137 08/09/19  0704    139   K 3.9 4.4    105   CO2 22* 28   * 144*   BUN 30* 25*   CREATININE 2.2* 1.5*   CALCIUM 9.5 9.2   PROT 6.9  --    ALBUMIN 4.0  --    BILITOT 0.3  --    ALKPHOS 59  --    AST 25  --    ALT 31  --    ANIONGAP 14 6*   EGFRNONAA 31* 50*     Cardiac Markers: No results for input(s): CKMB, MYOGLOBIN, BNP, TROPISTAT in the last 48 hours.  POCT Glucose:   Recent Labs   Lab 08/09/19  0110 08/09/19  0444   POCTGLUCOSE 214* 144*     Urine Culture: No results for input(s): LABURIN in the last 48 hours.  Urine Studies:   Recent Labs   Lab 08/09/19  0545   COLORU Yellow   APPEARANCEUA Clear   PHUR 5.0   SPECGRAV >=1.030*   PROTEINUA Negative   GLUCUA Negative   KETONESU Negative   BILIRUBINUA Negative   OCCULTUA Negative   NITRITE Negative   UROBILINOGEN Negative   LEUKOCYTESUR Negative       Significant Imaging: I have reviewed all pertinent imaging results/findings within the past 24 hours.    Assessment/Plan:     * Acute kidney injury  Baseline BUN--9, Cr--0.8.  -BUN---25; Cr---1.5  -cont IVF---NS@75 ml/hr  -recheck BMP at 1400  -may d/c later today if feeling ok      Hypertension  On no b/p medications. Blood pressure WNL  -SBP range---  -monitor      Type 2 diabetes mellitus without complication, with long-term current use of insulin  -Check Hemoglobin A1c  -Check BG Ac/Hs  -cont long acting insulin  -low dose SSI as needed  -diabetic diet low sodium diet        VTE Risk Mitigation (From admission, onward)        Ordered     IP VTE LOW RISK PATIENT  Once      08/09/19 0351     Place sequential compression device  Until discontinued      08/09/19 0351             Frieda Burks NP  Department of Hospital Medicine   [facility]  [place]

## 2019-08-09 NOTE — ED NOTES
"Pt presents to the ED with c/o fatigue, dry mouth and intermittent lightheadedness x 3 days. 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 States he comes in "once or twice a year to get fluids". Denies SOB, chest pain, and fever. Denies pain of any kind. Denies n/v/d.   "

## 2019-08-09 NOTE — PLAN OF CARE
Problem: Adult Inpatient Plan of Care  Goal: Plan of Care Review  Outcome: Outcome(s) achieved Date Met: 08/09/19  Pt AAO x 4.  VSS.  Pt remained afebrile throughout this shift.   Pt remained free of falls this shift.   Pt c/o pain this shift.  PRN analgesics administered as ordered.   Plan of care reviewed. Patient verbalizes understanding.   Pt moving/turing independently. Frequent weight shifting encouraged.  Patient SB/SR on monitor.   Bed low, side rails up x 2, wheels locked, call light in reach.   Pt family member remained at bedside most of shift.   Bed alarm maintained for safety.   Patient instructed to call for assistance.   Hourly rounding completed.   24 hour chart check completed.  Will continue to monitor.    Pt is being discharged to home, pt verbalizes understanding of discharge instructions, follow up visits and changes to medication, IV out, tele monitor off. Pt is being discharge in stable condition

## 2019-08-09 NOTE — DISCHARGE SUMMARY
[facility]  Discharge Summary      Patient Name: Romero Bowens Jr.  MRN: [id number]  Admission Date: 8/9/2019  Hospital Length of Stay: 0 days  Discharge Date and Time:  08/09/2019 3:08 PM  Attending Physician: Florian Enriquez DO   Discharging Provider: Frieda Burks NP  Primary Care Provider: Dave Donaldson NP      HPI:   Mr. Romero Bowens is a 60 y/o male who lives in [place], [place] at his residence. The patients PCP is Dr. Rayna Chan. The patient is also being followed by Dr. Scott Reyes with GI. The patient has a past medical history of Diabetes mellitus, hypertension, cervicalgia, hypercholesteremia, jaw pain, lipoma of back, lumbago, premature beats, sciatica, scoliosis (and kyphoscoliosis), and cardiac murmurs. No past surgical history noted. The patient denies smoking cigarettes, drinking alcohol , but endorses marijuana use.     The patient presents to [facility] with complaints of  generalized weakness for the last few days. Patient has been working in his yard for the last few days. He suspects that heat exposure is exacerbating symptoms. Associated symptoms include dry mouth, decreased appetite, and one episode of vomiting. Patient reports that weakness is worse with position changes. He denies fever, chills, cough/cold symptoms, SOB, chest pain, N/V/D, abdominal pain, numbness/tingling, weakness or any other concerning symptoms.    The ED workup shows a stable hemoglobin---13.9, BUN---25, Cr---1.6, glucose---144 . Patient received IV fluid bolus in the ED and NS @ 75 ml/hr. Admitted to CDU for observation.            * No surgery found *      Hospital Course:   He was admitted to the CDU for observation. He was given NS bolus and IVF NS @ 75 ml/hr. BUN--30>25>20; Cr---2.2>1.5>1.1. Patient states he is feeling much better. Ok will d/c to home. Will need follow up with PCP on d/c in 1-2 weeks post d/c.      Consults:     * Acute  kidney injury-resolved as of 8/9/2019  (Baseline BUN--9, Cr--0.8._  -BUN---30>25>20; Cr---2.2>1.5>1.1  -gave IVF---NS@75 ml/hr  -OK for d/c at this time    Type 2 diabetes mellitus without complication, with long-term current use of insulin  -Check Hemoglobin A1c  -Check BG Ac/Hs  -cont long acting insulin  -low dose SSI as needed  -diabetic diet low sodium diet  -cont home oral med      Hypertension-resolved as of 8/9/2019  On no b/p medications. Blood pressure WNL  -SBP range---  -monitor        Final Active Diagnoses:    Diagnosis Date Noted POA    Type 2 diabetes mellitus without complication, with long-term current use of insulin [E11.9, Z79.4] 08/09/2019 Yes      Problems Resolved During this Admission:    Diagnosis Date Noted Date Resolved POA    PRINCIPAL PROBLEM:  Acute kidney injury [N17.9] 08/09/2019 08/09/2019 Yes    Hypertension [I10] 08/09/2019 08/09/2019 Yes       Discharged Condition: stable    Disposition: Home or Self Care    Follow Up:  Follow-up Information     Dr Dave Donaldson On 8/22/2019.    Why:  @ 4:00 pm  Contact information:  [address] [place] [address]  Phone: [phone]                 Patient Instructions:      Diet diabetic     Notify your health care provider if you experience any of the following:  temperature >100.4     Notify your health care provider if you experience any of the following:  persistent nausea and vomiting or diarrhea     Notify your health care provider if you experience any of the following:  severe uncontrolled pain     Notify your health care provider if you experience any of the following:  difficulty breathing or increased cough     Notify your health care provider if you experience any of the following:  severe persistent headache     Notify your health care provider if you experience any of the following:  worsening rash     Notify your health care provider if you experience any of the following:  persistent dizziness, light-headedness, or  visual disturbances     Notify your health care provider if you experience any of the following:  increased confusion or weakness     Activity as tolerated       Significant Diagnostic Studies: Labs:   BMP:   Recent Labs   Lab 08/09/19  0137 08/09/19  0704 08/09/19  1350   * 144* 115*    139 139   K 3.9 4.4 4.3    105 103   CO2 22* 28 28   BUN 30* 25* 20   CREATININE 2.2* 1.5* 1.1   CALCIUM 9.5 9.2 9.3   , CBC   Recent Labs   Lab 08/09/19  0137   WBC 7.35   HGB 13.9*   HCT 42.0       and A1C:   Recent Labs   Lab 08/09/19  0704   HGBA1C 7.5*       Pending Diagnostic Studies:     None         Medications:  Reconciled Home Medications:      Medication List      CONTINUE taking these medications    erythromycin ophthalmic ointment  Commonly known as:  ROMYCIN  Place into the right eye every 6 (six) hours. For 5 days     LANTUS SOLOSTAR U-100 INSULIN glargine 100 units/mL (3mL) SubQ pen  Generic drug:  insulin  Inject 18 Units into the skin once daily.     metFORMIN 1000 MG tablet  Commonly known as:  GLUCOPHAGE  Take 1,000 mg by mouth 2 (two) times daily.     pravastatin 10 MG tablet  Commonly known as:  PRAVACHOL  Take 10 mg by mouth once daily.        STOP taking these medications    simvastatin 40 MG tablet  Commonly known as:  ZOCOR            Indwelling Lines/Drains at time of discharge:   Lines/Drains/Airways          None          Time spent on the discharge of patient: 35 minutes  Patient was seen and examined on the date of discharge and determined to be suitable for discharge.         Frieda Burks NP  Department of Hospital Medicine  [facility]

## 2019-08-09 NOTE — SUBJECTIVE & OBJECTIVE
Past Medical History:   Diagnosis Date    Cervicalgia     Diabetes mellitus     Hypercholesteremia     Hypertension     Jaw pain     Lipoma of back     Lumbago     Premature beats, unspecified     Sciatica     Scoliosis (and kyphoscoliosis), idiopathic     Undiagnosed cardiac murmurs        History reviewed. No pertinent surgical history.    Review of patient's allergies indicates:  No Known Allergies    No current facility-administered medications on file prior to encounter.      Current Outpatient Medications on File Prior to Encounter   Medication Sig    insulin (LANTUS SOLOSTAR U-100 INSULIN) glargine 100 units/mL (3mL) SubQ pen Inject 18 Units into the skin once daily.    pravastatin (PRAVACHOL) 10 MG tablet Take 10 mg by mouth once daily.    simvastatin (ZOCOR) 40 MG tablet Take 1 tablet (40 mg total) by mouth every evening.    erythromycin (ROMYCIN) ophthalmic ointment Place into the right eye every 6 (six) hours. For 5 days    metformin (GLUCOPHAGE) 1000 MG tablet Take 1,000 mg by mouth 2 (two) times daily.     Family History     None        Tobacco Use    Smoking status: Never Smoker    Smokeless tobacco: Never Used   Substance and Sexual Activity    Alcohol use: No    Drug use: Yes     Types: Marijuana    Sexual activity: Not on file     Review of Systems   Constitutional: Positive for activity change, appetite change and fatigue. Negative for chills, diaphoresis and fever.   HENT: Negative for trouble swallowing.    Eyes: Negative for visual disturbance.   Respiratory: Negative for cough, shortness of breath and wheezing.    Cardiovascular: Negative for chest pain, palpitations and leg swelling.   Gastrointestinal: Negative for abdominal distention, abdominal pain, blood in stool, constipation, diarrhea, nausea and vomiting.   Genitourinary: Negative for difficulty urinating and hematuria.   Musculoskeletal: Negative for arthralgias, back pain, gait problem and myalgias.   Skin:  Negative for color change, pallor, rash and wound.   Neurological: Negative for dizziness, tremors, seizures, syncope, speech difficulty, weakness and light-headedness.   Hematological: Does not bruise/bleed easily.   Psychiatric/Behavioral: Negative for agitation, confusion and hallucinations. The patient is not nervous/anxious.      Objective:     Vital Signs (Most Recent):  Temp: 97.7 °F (36.5 °C) (08/09/19 0505)  Pulse: (!) 57 (08/09/19 0749)  Resp: 18 (08/09/19 0505)  BP: 121/66 (08/09/19 0505)  SpO2: 99 % (08/09/19 0505) Vital Signs (24h Range):  Temp:  [97.6 °F (36.4 °C)-98.5 °F (36.9 °C)] 97.7 °F (36.5 °C)  Pulse:  [55-97] 57  Resp:  [15-21] 18  SpO2:  [98 %-100 %] 99 %  BP: ()/(50-66) 121/66     Weight: 65.8 kg (145 lb)  Body mass index is 21.41 kg/m².    Physical Exam   Constitutional: He is oriented to person, place, and time. He appears well-developed and well-nourished. No distress.   HENT:   Head: Normocephalic and atraumatic.   Eyes: Pupils are equal, round, and reactive to light. Conjunctivae and EOM are normal.   Neck: Normal range of motion. Neck supple. No JVD present.   Cardiovascular: Normal rate, regular rhythm, normal heart sounds and intact distal pulses.   No murmur heard.  Pulmonary/Chest: Effort normal and breath sounds normal. No respiratory distress. He has no wheezes.   Abdominal: Soft. Bowel sounds are normal. He exhibits no distension. There is no tenderness. There is no guarding.   Musculoskeletal: Normal range of motion. He exhibits no edema or tenderness.   Neurological: He is alert and oriented to person, place, and time.   Skin: Skin is warm and dry. Capillary refill takes less than 2 seconds. No rash noted. He is not diaphoretic. No erythema. No pallor.   Psychiatric: He has a normal mood and affect. His behavior is normal. Judgment and thought content normal.   Nursing note and vitals reviewed.        CRANIAL NERVES     CN III, IV, VI   Pupils are equal, round, and  reactive to light.  Extraocular motions are normal.        Significant Labs:   A1C:   Recent Labs   Lab 08/09/19  0704   HGBA1C 7.5*     CBC:   Recent Labs   Lab 08/09/19 0137   WBC 7.35   HGB 13.9*   HCT 42.0        CMP:   Recent Labs   Lab 08/09/19  0137 08/09/19  0704    139   K 3.9 4.4    105   CO2 22* 28   * 144*   BUN 30* 25*   CREATININE 2.2* 1.5*   CALCIUM 9.5 9.2   PROT 6.9  --    ALBUMIN 4.0  --    BILITOT 0.3  --    ALKPHOS 59  --    AST 25  --    ALT 31  --    ANIONGAP 14 6*   EGFRNONAA 31* 50*     Cardiac Markers: No results for input(s): CKMB, MYOGLOBIN, BNP, TROPISTAT in the last 48 hours.  POCT Glucose:   Recent Labs   Lab 08/09/19  0110 08/09/19  0444   POCTGLUCOSE 214* 144*     Urine Culture: No results for input(s): LABURIN in the last 48 hours.  Urine Studies:   Recent Labs   Lab 08/09/19  0545   COLORU Yellow   APPEARANCEUA Clear   PHUR 5.0   SPECGRAV >=1.030*   PROTEINUA Negative   GLUCUA Negative   KETONESU Negative   BILIRUBINUA Negative   OCCULTUA Negative   NITRITE Negative   UROBILINOGEN Negative   LEUKOCYTESUR Negative       Significant Imaging: I have reviewed all pertinent imaging results/findings within the past 24 hours.

## 2019-08-09 NOTE — ASSESSMENT & PLAN NOTE
-Check Hemoglobin A1c  -Check BG Ac/Hs  -cont long acting insulin  -low dose SSI as needed  -diabetic diet low sodium diet  -cont home oral med

## 2019-08-09 NOTE — ASSESSMENT & PLAN NOTE
-Check Hemoglobin A1c  -Check BG Ac/Hs  -cont long acting insulin  -low dose SSI as needed  -diabetic diet low sodium diet

## 2019-08-09 NOTE — DISCHARGE INSTRUCTIONS
Kidney Injury, Acute, Discharge Instructions for (English) View Edit Remove   Dehydration (Adult) (English) View Edit Remove

## 2019-08-09 NOTE — ED NOTES
Pt updated on POC; states he is unable to void at this time. Informed pt of need for urine specimen, pt v/u.

## 2019-08-09 NOTE — ED PROVIDER NOTES
Encounter Date: 8/9/2019    SCRIBE #1 NOTE: I, Ceci Dumont, am scribing for, and in the presence of,  Dr. Wiggins. I have scribed the entire note.       History     Chief Complaint   Patient presents with    Fatigue     present X days, reports has been working outside. reports drinking water throughout the day, but is still having weakness with dry mouth. pt denies falling with weakness, reports decreased appetite.      Romero Bowens Jr. is a 58 yo M who  has a past medical history of Cervicalgia, Diabetes mellitus, Hypercholesteremia, Hypertension, Jaw pain, Lipoma of back, Lumbago, Premature beats, unspecified, Sciatica, Scoliosis (and kyphoscoliosis), idiopathic, and Undiagnosed cardiac murmurs.    The patient presents to the ED due to generalized weakness for the last few days. Patient has been working in his yard for the last few days. He suspects that heat exposure is exacerbating symptoms. Associated symptoms include dry mouth, decreased appetite, and one episode of vomiting. Patient reports that weakness is worse with position changes. He denies fever, chills, cough/cold symptoms, SOB, chest pain, N/V/D, abdominal pain, numbness/tingling, weakness or any other concerning symptoms.    The history is provided by the patient.     Review of patient's allergies indicates:  No Known Allergies  Past Medical History:   Diagnosis Date    Cervicalgia     Diabetes mellitus     Hypercholesteremia     Hypertension     Jaw pain     Lipoma of back     Lumbago     Premature beats, unspecified     Sciatica     Scoliosis (and kyphoscoliosis), idiopathic     Undiagnosed cardiac murmurs      No past surgical history on file.  No family history on file.  Social History     Tobacco Use    Smoking status: Never Smoker    Smokeless tobacco: Never Used   Substance Use Topics    Alcohol use: No    Drug use: Yes     Types: Marijuana     Review of Systems   Constitutional: Positive for appetite change. Negative for  chills and fever.   HENT: Negative for congestion, ear pain, rhinorrhea and sore throat.    Respiratory: Negative for cough and shortness of breath.    Cardiovascular: Negative for chest pain.   Gastrointestinal: Negative for abdominal pain, diarrhea, nausea and vomiting.   Genitourinary: Negative for dysuria and hematuria.   Musculoskeletal: Negative for myalgias and neck pain.   Skin: Negative for rash.   Neurological: Positive for weakness. Negative for dizziness, light-headedness and headaches.   Psychiatric/Behavioral: Negative for confusion.       Physical Exam     Initial Vitals [08/09/19 0029]   BP Pulse Resp Temp SpO2   (!) 94/53 97 15 98.5 °F (36.9 °C) 98 %      MAP       --         Physical Exam    Nursing note and vitals reviewed.  Constitutional: He appears well-developed and well-nourished. He is not diaphoretic. No distress.   HENT:   Head: Normocephalic and atraumatic.   Mouth/Throat: Oropharynx is clear and moist. Mucous membranes are dry.   Eyes: EOM are normal. Pupils are equal, round, and reactive to light.   Neck: No tracheal deviation present.   Cardiovascular: Normal rate, regular rhythm, normal heart sounds and intact distal pulses.   Pulmonary/Chest: Breath sounds normal. No stridor. No respiratory distress.   Abdominal: Soft. He exhibits no distension and no mass. There is no tenderness.   Musculoskeletal: Normal range of motion. He exhibits no edema.   Neurological: He is alert and oriented to person, place, and time. No cranial nerve deficit or sensory deficit.   CN 2-12 intact  Finger to nose within normal limits  Negative romberg  Gait normal  Equal strength to bilateral upper extremities, SILT  Equal strength to bilateral lower extremities, SILT     Skin: Skin is warm and dry. Capillary refill takes less than 2 seconds. No rash noted.   Psychiatric: He has a normal mood and affect. His behavior is normal. Thought content normal.         ED Course   Procedures  Labs Reviewed - No data  to display  EKG Readings: (Independently Interpreted)   Rate 66. NSR. No STEMI.        X-Rays:   Independently Interpreted Readings:   Other Readings:  Reviewed by myself, read by radiology.      Imaging Results    None        Medical Decision Making:   Differential Diagnosis:   Differential Diagnosis includes, but is not limited to:  CVA/TIA, vertigo, anemia/blood loss, cardiogenic shock, arrhythmia, orthostatic hypotension, dehydration, medication side effect, vitamin deficiency, liver disease, hypothyroidism, drug intoxication/withdrawal, metabolic derangement.   Clinical Tests:   Lab Tests: Ordered and Reviewed  ED Management:  59 year old male with light headedness and fatigue    Labs show acute kidney injury     Given IVF in ED patient will be placed in observation by [facility]                       Clinical Impression:       ICD-10-CM ICD-9-CM   1. Acute kidney injury N17.9 584.9   2. Fatigue R53.83 780.79             Scribe Attestation I, Jose Wiggins,  personally performed the services described in this documentation. All medical record entries made by the scribe were at my direction and in my presence.  I have reviewed the chart and agree that the record reflects my personal performance and is accurate and complete. Jose Wiggins M.D. 1:28 PM08/10/2019       Portions of this note were dictated using voice recognition software and may contain dictation related errors in spelling/grammar/syntax not found on text review                    Jose Wiggins Jr., MD  08/10/19 2416

## 2019-08-09 NOTE — ED NOTES
Pt currently in bed, appears to be resting comfortably. Updated on POC and v/u. Denies any needs at this time. Comfort and safety measures maintained.

## 2019-08-09 NOTE — HPI
Mr. Romero Bowens is a 58 y/o male who lives in [place], [place] at his residence. The patients PCP is Dr. Rayna Chan. The patient is also being followed by Dr. Scott Reyes with GI. The patient has a past medical history of Diabetes mellitus, hypertension, cervicalgia, hypercholesteremia, jaw pain, lipoma of back, lumbago, premature beats, sciatica, scoliosis (and kyphoscoliosis), and cardiac murmurs. No past surgical history noted. The patient denies smoking cigarettes, drinking alcohol , but endorses marijuana use.     The patient presents to [facility] with complaints of  generalized weakness for the last few days. Patient has been working in his yard for the last few days. He suspects that heat exposure is exacerbating symptoms. Associated symptoms include dry mouth, decreased appetite, and one episode of vomiting. Patient reports that weakness is worse with position changes. He denies fever, chills, cough/cold symptoms, SOB, chest pain, N/V/D, abdominal pain, numbness/tingling, weakness or any other concerning symptoms.    The ED workup shows a stable hemoglobin---13.9, BUN---25, Cr---1.6, glucose---144 . Patient received IV fluid bolus in the ED and NS @ 75 ml/hr. Admitted to CDU for observation.

## 2019-08-09 NOTE — ASSESSMENT & PLAN NOTE
Baseline BUN--9, Cr--0.8.  -BUN---25; Cr---1.5  -cont IVF---NS@75 ml/hr  -recheck BMP at 1400  -may d/c later today if feeling ok

## 2019-08-09 NOTE — HOSPITAL COURSE
He was admitted to the CDU for observation. He was given NS bolus and IVF NS @ 75 ml/hr. BUN--30>25>20; Cr---2.2>1.5>1.1. Patient states he is feeling much better. Ok will d/c to home. Will need follow up with PCP on d/c in 1-2 weeks post d/c.

## 2019-08-09 NOTE — ASSESSMENT & PLAN NOTE
(Baseline BUN--9, Cr--0.8._  -BUN---30>25>20; Cr---2.2>1.5>1.1  -gave IVF---NS@75 ml/hr  -OK for d/c at this time

## 2019-09-05 ENCOUNTER — TELEPHONE (OUTPATIENT)
Dept: TRANSPLANT | Facility: CLINIC | Age: 59
End: 2019-09-05

## 2019-09-05 NOTE — TELEPHONE ENCOUNTER
----- Message from Yoli Hearn sent at 9/5/2019  3:15 PM CDT -----    Rec'raul only ref order form from YURI SMITH NP office. Called Md office at [phone], but there was no answer. LVM for them to fax pt Md note, labs and img reports to [phone]

## 2019-10-01 ENCOUNTER — TELEPHONE (OUTPATIENT)
Dept: TRANSPLANT | Facility: CLINIC | Age: 59
End: 2019-10-01

## 2019-10-04 ENCOUNTER — DOCUMENTATION ONLY (OUTPATIENT)
Dept: TRANSPLANT | Facility: CLINIC | Age: 59
End: 2019-10-04

## 2019-10-04 NOTE — NURSING
Pt records reviewed.  Pt will be referred to Hepatitis C Clinic due to HEP C   Initial referral received from Dave Mendoza NP   Referral letter sent to provider and patient.

## 2019-10-04 NOTE — LETTER
October 4, 2019    Dave Donaldson NP  [address]  [address]  [facility] [address]      Dear Dr. Donaldson    Patient: Romero Bowens Jr.   MR Number: [id number]   YOB: 1960     Thank you for the referral of Romero Bowens Jr. to the [facility] program. An initial appointment will be scheduled for your patient with one of our Hepatologists.      Thank you again for your trust in our program.  If there is anything we can do for you or your staff, please feel free to contact us.        Sincerely,        [facility]  [address] [place] [id number]  [phone]

## 2019-10-04 NOTE — LETTER
October 4, 2019    Romero Bowens  [address]  [place] [address]      Dear Romero Bowens:    Your doctor has referred you to the [facility]. We are sending this letter to remind you to make an appointment with us to complete the referral process.     Please call us at [phone] to schedule an appointment. We look forward to seeing you soon.     If you received a call and have been scheduled, please disregard this letter.       Sincerely,        [facility]   [facility] [place] [address]  [phone]

## 2019-10-09 ENCOUNTER — TELEPHONE (OUTPATIENT)
Dept: HEPATOLOGY | Facility: CLINIC | Age: 59
End: 2019-10-09

## 2019-10-09 NOTE — TELEPHONE ENCOUNTER
ARELI Donaldson ordered that patient be scheduled for a hep c consult.  Attempt made to reach him by phone unsuccessful.  The  ([facility]) has also made multiple attempts to reach patient.  Letter sent asking that he contact us for scheduling.  Clinicals will be sent for scanning into [employer].

## 2019-12-27 ENCOUNTER — LAB VISIT (OUTPATIENT)
Dept: LAB | Facility: HOSPITAL | Age: 59
End: 2019-12-27
Payer: MEDICARE

## 2019-12-27 ENCOUNTER — INITIAL CONSULT (OUTPATIENT)
Dept: HEPATOLOGY | Facility: CLINIC | Age: 59
End: 2019-12-27
Payer: MEDICARE

## 2019-12-27 VITALS
BODY MASS INDEX: 20.76 KG/M2 | RESPIRATION RATE: 18 BRPM | HEIGHT: 69 IN | HEART RATE: 68 BPM | WEIGHT: 140.19 LBS | OXYGEN SATURATION: 99 % | DIASTOLIC BLOOD PRESSURE: 77 MMHG | SYSTOLIC BLOOD PRESSURE: 155 MMHG

## 2019-12-27 DIAGNOSIS — Z11.4 ENCOUNTER FOR SCREENING FOR HUMAN IMMUNODEFICIENCY VIRUS (HIV): ICD-10-CM

## 2019-12-27 DIAGNOSIS — B18.2 CHRONIC HEPATITIS C WITHOUT HEPATIC COMA: Primary | ICD-10-CM

## 2019-12-27 DIAGNOSIS — B18.2 CHRONIC HEPATITIS C WITHOUT HEPATIC COMA: ICD-10-CM

## 2019-12-27 LAB
HBV CORE AB SERPL QL IA: NEGATIVE
HBV SURFACE AB SER-ACNC: NEGATIVE M[IU]/ML
HBV SURFACE AG SERPL QL IA: NEGATIVE
HEPATITIS A ANTIBODY, IGG: NEGATIVE
HIV 1+2 AB+HIV1 P24 AG SERPL QL IA: NEGATIVE

## 2019-12-27 PROCEDURE — 3078F DIAST BP <80 MM HG: CPT | Mod: HCNC,CPTII,S$GLB, | Performed by: PHYSICIAN ASSISTANT

## 2019-12-27 PROCEDURE — 3077F SYST BP >= 140 MM HG: CPT | Mod: HCNC,CPTII,S$GLB, | Performed by: PHYSICIAN ASSISTANT

## 2019-12-27 PROCEDURE — 99203 PR OFFICE/OUTPT VISIT, NEW, LEVL III, 30-44 MIN: ICD-10-PCS | Mod: HCNC,S$GLB,, | Performed by: PHYSICIAN ASSISTANT

## 2019-12-27 PROCEDURE — 86703 HIV-1/HIV-2 1 RESULT ANTBDY: CPT | Mod: HCNC

## 2019-12-27 PROCEDURE — 99999 PR PBB SHADOW E&M-EST. PATIENT-LVL III: ICD-10-PCS | Mod: PBBFAC,HCNC,, | Performed by: PHYSICIAN ASSISTANT

## 2019-12-27 PROCEDURE — 36415 COLL VENOUS BLD VENIPUNCTURE: CPT | Mod: HCNC

## 2019-12-27 PROCEDURE — 3077F PR MOST RECENT SYSTOLIC BLOOD PRESSURE >= 140 MM HG: ICD-10-PCS | Mod: HCNC,CPTII,S$GLB, | Performed by: PHYSICIAN ASSISTANT

## 2019-12-27 PROCEDURE — 86704 HEP B CORE ANTIBODY TOTAL: CPT | Mod: HCNC

## 2019-12-27 PROCEDURE — 3008F BODY MASS INDEX DOCD: CPT | Mod: HCNC,CPTII,S$GLB, | Performed by: PHYSICIAN ASSISTANT

## 2019-12-27 PROCEDURE — 3008F PR BODY MASS INDEX (BMI) DOCUMENTED: ICD-10-PCS | Mod: HCNC,CPTII,S$GLB, | Performed by: PHYSICIAN ASSISTANT

## 2019-12-27 PROCEDURE — 99499 UNLISTED E&M SERVICE: CPT | Mod: HCNC,S$GLB,, | Performed by: PHYSICIAN ASSISTANT

## 2019-12-27 PROCEDURE — 86790 VIRUS ANTIBODY NOS: CPT | Mod: HCNC

## 2019-12-27 PROCEDURE — 3078F PR MOST RECENT DIASTOLIC BLOOD PRESSURE < 80 MM HG: ICD-10-PCS | Mod: HCNC,CPTII,S$GLB, | Performed by: PHYSICIAN ASSISTANT

## 2019-12-27 PROCEDURE — 99203 OFFICE O/P NEW LOW 30 MIN: CPT | Mod: HCNC,S$GLB,, | Performed by: PHYSICIAN ASSISTANT

## 2019-12-27 PROCEDURE — 87340 HEPATITIS B SURFACE AG IA: CPT | Mod: HCNC

## 2019-12-27 PROCEDURE — 99999 PR PBB SHADOW E&M-EST. PATIENT-LVL III: CPT | Mod: PBBFAC,HCNC,, | Performed by: PHYSICIAN ASSISTANT

## 2019-12-27 PROCEDURE — 99499 RISK ADDL DX/OHS AUDIT: ICD-10-PCS | Mod: HCNC,S$GLB,, | Performed by: PHYSICIAN ASSISTANT

## 2019-12-27 PROCEDURE — 86706 HEP B SURFACE ANTIBODY: CPT | Mod: HCNC

## 2019-12-27 NOTE — LETTER
December 27, 2019      Dave Donaldson NP  [address]  [address]  [facility] [address]           Bobby Mcnair - Hepatitis C  [address]  [facility] [address]  Phone: [phone]  Fax: [phone]          Patient: Romero Bowens Jr.   MR Number: [id number]   YOB: 1960   Date of Visit: 12/27/2019       Dear Dave Donaldson:    Thank you for referring Romero Bowens to me for evaluation. Attached you will find relevant portions of my assessment and plan of care.    If you have questions, please do not hesitate to call me. I look forward to following Romero Bowens along with you.    Sincerely,    Jennifer B. Scheuermann, PA    Enclosure  CC:  No Recipients    If you would like to receive this communication electronically, please contact [email or web address] or [phone] to request more information on [employer] access.    For providers and/or their staff who would like to refer a patient to [facility], please contact us through our one-stop-shop provider referral line, [facility], at [phone].    If you feel you have received this communication in error or would no longer like to receive these types of communications, please e-mail [email or web address]

## 2019-12-27 NOTE — PROGRESS NOTES
HEPATOLOGY CLINIC VISIT NOTE - HCV clinic    OUTSIDE REFERRING PROVIDER: Dave Donaldson NP     CHIEF COMPLAINT: Hepatitis C   (here w/ female friend)    HISTORY: This is a 59 y.o. Black or  male with chronic hepatitis C, here for further eval / mngmt. Outside records have been received / reviewed.      HCV history:  Recently diagnosed  Risks for HCV: tattoos - dating back to late teen years    Denies blood transfusions, IVDA, intranasal drug use  - Treatment naive  - Genotype 1b  - HCV RNA 2,940,000  IU/mL - 9/3/19    Liver staging:  HCV FibroSURE 6/2019 - A0, F0    No liver imaging  Labs reveal well preserved liver function w/ no evidence of advanced fibrosis:    Feels well other than back pain  Denies jaundice, dark urine, abdominal distention, hematemesis, melena, slowed mentation.   No abnormal skin rashes. No generalized joint pain.                     Past Medical History:   Diagnosis Date    Cervicalgia     Chronic hepatitis C     Diabetes mellitus     Hypercholesteremia     Hypertension     Jaw pain     Lipoma of back     Lumbago     Premature beats, unspecified     Sciatica     Scoliosis (and kyphoscoliosis), idiopathic     Undiagnosed cardiac murmurs        No past surgical history on file.      FAMILY HISTORY: Negative for liver disease    SOCIAL HISTORY:   Not . Resides in [place]  Social History     Tobacco Use   Smoking Status Never Smoker   Smokeless Tobacco Never Used     Alcohol - quit during late teen years. Heavy prior  Drugs - denies      ROS:   No fever, chills, weight loss, fatigue  No chest pain, palpitations, dyspnea, cough  (+) Intermittent upper abdominal pain, No change in bowel pattern, nausea, vomiting, GERD  No dysuria, hematuria   No skin rashes   No headaches  (+) back pain, radiates down right leg  No lower extremity edema  No depression or anxiety      PHYSICAL EXAM:  Friendly Black or  male, in no acute distress; alert and oriented to  person, place and time  VITALS: reviewed  HEENT: Sclerae anicteric.   NECK: Supple  CVS: Regular rate and rhythm. No murmurs  LUNGS: Normal respiratory effort. Clear bilaterally  ABDOMEN: Flat, soft, nontender. No organomegaly or masses. No ascites or hernias. Good bowel sounds.    SKIN: Warm and dry. No jaundice, No obvious rashes. (+) tattoos  EXTREMITIES: No lower extremity edema  NEURO/PSYCH: Normal gate. Memory intact. Thought and speech pattern appropriate. Behavior normal. No depression or anxiety noted.    RECENT LABS:  Lab Results   Component Value Date    WBC 7.35 08/09/2019    HGB 13.9 (L) 08/09/2019     08/09/2019     Lab Results   Component Value Date    INR 1.0 09/12/2018     Lab Results   Component Value Date    AST 25 08/09/2019    ALT 31 08/09/2019    BILITOT 0.3 08/09/2019    ALBUMIN 4.0 08/09/2019    ALKPHOS 59 08/09/2019    CREATININE 1.1 08/09/2019    BUN 20 08/09/2019     08/09/2019    K 4.3 08/09/2019       RECENT IMAGING:  none    ASSESSMENT  59 y.o. Black or  male with:  1. CHRONIC HEPATITIS C, GENOTYPE 1b - treatment naive  -- FibroSURE 6/2019 - A0, F0  -- Unknown Immunity to HAV & HBV      EDUCATION:  The natural history of Hepatitis C, including potential progression to cirrhosis was reviewed. We discussed the increased progression of liver disease secondary to alcohol use; patient was advised to avoid alcohol completely.     Transmission of Hepatitis C was reviewed, including possible sexual transmission. Sexual contacts should be screened. Patient should avoid sharing personal products such as razors, toothbrushes, etc.     Recommend avoiding raw seafood.  Limit acetaminophen to 2000mg daily.        PLAN:  1. Labs today  2. Ultrasound and fibroscan and f/u visit in few weeks. Goal of antiviral therapy.  Orders Placed This Encounter   Procedures    FibroScan (Vibration Controlled Transient Elastography)    US Abdomen Limited    HIV 1/2 Ag/Ab (4th Gen)     Hepatitis B surface antigen    Hepatitis B surface antibody    Hepatitis B core antibody, total    Hepatitis A antibody, IgG       2. Follow up visit. Goal of antiviral therapy

## 2020-01-20 ENCOUNTER — HOSPITAL ENCOUNTER (OUTPATIENT)
Dept: RADIOLOGY | Facility: HOSPITAL | Age: 60
Discharge: HOME | End: 2020-01-20
Attending: PHYSICIAN ASSISTANT
Payer: MEDICARE

## 2020-01-20 ENCOUNTER — TELEPHONE (OUTPATIENT)
Dept: HEPATOLOGY | Facility: CLINIC | Age: 60
End: 2020-01-20

## 2020-01-20 DIAGNOSIS — B18.2 CHRONIC HEPATITIS C WITHOUT HEPATIC COMA: ICD-10-CM

## 2020-01-20 PROCEDURE — 76705 US ABDOMEN LIMITED: ICD-10-PCS | Mod: 26,HCNC,, | Performed by: RADIOLOGY

## 2020-01-20 PROCEDURE — 76705 ECHO EXAM OF ABDOMEN: CPT | Mod: 26,HCNC,, | Performed by: RADIOLOGY

## 2020-01-20 PROCEDURE — 76705 ECHO EXAM OF ABDOMEN: CPT | Mod: TC,HCNC

## 2020-01-20 NOTE — TELEPHONE ENCOUNTER
U/s 1/20 reviewed - looked fine    Pt came to day and had u/s  Didn't show up for fibroscan and visit. Need to r/s    pls call  thanks

## 2020-01-21 NOTE — TELEPHONE ENCOUNTER
Spoke with pt, informed pt of missed visit and fibroscan. Pt visit and fibroscan rescheduled as instructed. Reminder notices mailed to pt. Instructed pt not to eat or drink 3 hours prior to visit. Pt verbalized understanding and agreement.

## 2020-02-13 ENCOUNTER — OFFICE VISIT (OUTPATIENT)
Dept: HEPATOLOGY | Facility: CLINIC | Age: 60
End: 2020-02-13
Payer: MEDICARE

## 2020-02-13 ENCOUNTER — PROCEDURE VISIT (OUTPATIENT)
Dept: HEPATOLOGY | Facility: CLINIC | Age: 60
End: 2020-02-13
Payer: MEDICARE

## 2020-02-13 ENCOUNTER — CLINICAL SUPPORT (OUTPATIENT)
Dept: INFECTIOUS DISEASES | Facility: CLINIC | Age: 60
End: 2020-02-13
Payer: MEDICARE

## 2020-02-13 VITALS
HEIGHT: 69 IN | BODY MASS INDEX: 21.42 KG/M2 | SYSTOLIC BLOOD PRESSURE: 178 MMHG | DIASTOLIC BLOOD PRESSURE: 92 MMHG | HEART RATE: 67 BPM | WEIGHT: 144.63 LBS | RESPIRATION RATE: 16 BRPM

## 2020-02-13 DIAGNOSIS — B18.2 CHRONIC HEPATITIS C WITHOUT HEPATIC COMA: ICD-10-CM

## 2020-02-13 DIAGNOSIS — Z23 VACCINE FOR VIRAL HEPATITIS: Primary | ICD-10-CM

## 2020-02-13 DIAGNOSIS — Z23 VACCINE FOR VIRAL HEPATITIS: ICD-10-CM

## 2020-02-13 DIAGNOSIS — K21.9 GASTROESOPHAGEAL REFLUX DISEASE, ESOPHAGITIS PRESENCE NOT SPECIFIED: ICD-10-CM

## 2020-02-13 DIAGNOSIS — Z00.00 PREVENTATIVE HEALTH CARE: ICD-10-CM

## 2020-02-13 PROCEDURE — 3080F DIAST BP >= 90 MM HG: CPT | Mod: HCNC,CPTII,S$GLB, | Performed by: PHYSICIAN ASSISTANT

## 2020-02-13 PROCEDURE — 99213 PR OFFICE/OUTPT VISIT, EST, LEVL III, 20-29 MIN: ICD-10-PCS | Mod: 25,HCNC,S$GLB, | Performed by: PHYSICIAN ASSISTANT

## 2020-02-13 PROCEDURE — 3008F PR BODY MASS INDEX (BMI) DOCUMENTED: ICD-10-PCS | Mod: HCNC,CPTII,S$GLB, | Performed by: PHYSICIAN ASSISTANT

## 2020-02-13 PROCEDURE — 91200 LIVER ELASTOGRAPHY: CPT | Mod: HCNC,S$GLB,, | Performed by: PHYSICIAN ASSISTANT

## 2020-02-13 PROCEDURE — 99499 RISK ADDL DX/OHS AUDIT: ICD-10-PCS | Mod: HCWC,S$GLB,, | Performed by: PHYSICIAN ASSISTANT

## 2020-02-13 PROCEDURE — 99499 UNLISTED E&M SERVICE: CPT | Mod: HCWC,S$GLB,, | Performed by: PHYSICIAN ASSISTANT

## 2020-02-13 PROCEDURE — 91200 FIBROSCAN (VIBRATION CONTROLLED TRANSIENT ELASTOGRAPHY): ICD-10-PCS | Mod: HCNC,S$GLB,, | Performed by: PHYSICIAN ASSISTANT

## 2020-02-13 PROCEDURE — 99213 OFFICE O/P EST LOW 20 MIN: CPT | Mod: 25,HCNC,S$GLB, | Performed by: PHYSICIAN ASSISTANT

## 2020-02-13 PROCEDURE — 90471 HEPATITIS A HEPATITIS B COMBINED VACCINE IM: ICD-10-PCS | Mod: HCNC,S$GLB,, | Performed by: INTERNAL MEDICINE

## 2020-02-13 PROCEDURE — 90471 IMMUNIZATION ADMIN: CPT | Mod: HCNC,S$GLB,, | Performed by: INTERNAL MEDICINE

## 2020-02-13 PROCEDURE — 99999 PR PBB SHADOW E&M-EST. PATIENT-LVL IV: ICD-10-PCS | Mod: PBBFAC,HCNC,, | Performed by: PHYSICIAN ASSISTANT

## 2020-02-13 PROCEDURE — 3008F BODY MASS INDEX DOCD: CPT | Mod: HCNC,CPTII,S$GLB, | Performed by: PHYSICIAN ASSISTANT

## 2020-02-13 PROCEDURE — 3080F PR MOST RECENT DIASTOLIC BLOOD PRESSURE >= 90 MM HG: ICD-10-PCS | Mod: HCNC,CPTII,S$GLB, | Performed by: PHYSICIAN ASSISTANT

## 2020-02-13 PROCEDURE — 99999 PR PBB SHADOW E&M-EST. PATIENT-LVL IV: CPT | Mod: PBBFAC,HCNC,, | Performed by: PHYSICIAN ASSISTANT

## 2020-02-13 PROCEDURE — 90636 HEP A/HEP B VACC ADULT IM: CPT | Mod: HCNC,S$GLB,, | Performed by: INTERNAL MEDICINE

## 2020-02-13 PROCEDURE — 90636 HEPATITIS A HEPATITIS B COMBINED VACCINE IM: ICD-10-PCS | Mod: HCNC,S$GLB,, | Performed by: INTERNAL MEDICINE

## 2020-02-13 PROCEDURE — 3077F PR MOST RECENT SYSTOLIC BLOOD PRESSURE >= 140 MM HG: ICD-10-PCS | Mod: HCNC,CPTII,S$GLB, | Performed by: PHYSICIAN ASSISTANT

## 2020-02-13 PROCEDURE — 3077F SYST BP >= 140 MM HG: CPT | Mod: HCNC,CPTII,S$GLB, | Performed by: PHYSICIAN ASSISTANT

## 2020-02-13 RX ORDER — VELPATASVIR AND SOFOSBUVIR 100; 400 MG/1; MG/1
1 TABLET, FILM COATED ORAL DAILY
Qty: 28 TABLET | Refills: 2 | Status: SHIPPED | OUTPATIENT
Start: 2020-02-13 | End: 2020-07-10

## 2020-02-13 RX ORDER — INSULIN GLARGINE 300 U/ML
INJECTION, SOLUTION SUBCUTANEOUS
COMMUNITY
Start: 2020-01-12 | End: 2020-07-10

## 2020-02-13 NOTE — PROCEDURES
FibroScan (Vibration Controlled Transient Elastography)  Date/Time: 2/13/2020 8:45 AM  Performed by: Jennifer B. Scheuermann, PA  Authorized by: Jennifer B. Scheuermann, PA     Diagnosis:  HCV    Probe:  M    Universal Protocol: Patient's identity, procedure and site were verified, confirmatory pause was performed.  Discussed procedure including risks and potential complications.  Questions answered.  Patient verbalizes understanding and wishes to proceed with VCTE.     Procedure: After providing explanations of the procedure, patient was placed in the supine position with right arm in maximum abduction to allow optimal exposure of right lateral abdomen.  Patient was briefly assessed, Testing was performed in the mid-axillary location, 50Hz Shear Wave pulses were applied and the resulting Shear Wave and Propagation Speed detected with a 3.5 MHz ultrasonic signal, using the FibroScan probe, Skin to liver capsule distance and liver parenchyma were accessed during the entire examination with the FibroScan probe, Patient was instructed to breathe normally and to abstain from sudden movements during the procedure, allowing for random measurements of liver stiffness. At least 10 Shear Waves were produced, Individual measurements of each Shear Wave were calculated.  Patient tolerated the procedure well with no complications.  Meets discharge criteria as was dismissed.  Rates pain 0 out of 10.  Patient will follow up with ordering provider to review results.      Findings  Median liver stiffness score:  7.9  CAP Reading: dB/m:  207    IQR/med %:  9  Interpretation  Fibrosis interpretation is based on medial liver stiffness - Kilopascal (kPa).    Fibrosis Stage:  F2  Steatosis interpretation is based on controlled attenuation parameter - (dB/m).    Steatosis Grade:  <S1

## 2020-02-13 NOTE — PROGRESS NOTES
HEPATOLOGY CLINIC VISIT NOTE - HCV clinic    CHIEF COMPLAINT: Hepatitis C   (here w/ female friend)    HISTORY: This is a 60 y.o. Black or  male with chronic hepatitis C    Here for f/u w/ additional labs, imaging, liver staging.   Lacking immunity to HAV & HBV   HIV screen neg   No evidence of advanced fibrosis.    Feels well  Motivated to have HCV treated  Denies jaundice, dark urine, hematemesis, melena, slowed mentation, abdominal distention.       HCV history:  Recently diagnosed  Risks for HCV: tattoos - dating back to late teen years    Denies blood transfusions, IVDA, intranasal drug use  - Treatment naive  - Genotype 1b  - HCV RNA 2,940,000  IU/mL - 9/3/19    Liver staging:  HCV FibroSURE 6/2019 - A0, F0  FibroScan today 2/13/20 - kPa 7.9, F2 (, no steatosis)  Labs and imaging support fibroscan                    Past Medical History:   Diagnosis Date    Cervicalgia     Chronic hepatitis C     Diabetes mellitus     Hypercholesteremia     Hypertension     Jaw pain     Lipoma of back     Lumbago     Premature beats, unspecified     Sciatica     Scoliosis (and kyphoscoliosis), idiopathic     Undiagnosed cardiac murmurs        No past surgical history on file.      FAMILY HISTORY: Negative for liver disease    SOCIAL HISTORY:   Not . Resides in [place]  Social History     Tobacco Use   Smoking Status Never Smoker   Smokeless Tobacco Never Used     Alcohol - quit during late teen years. Heavy prior  Drugs - denies      ROS:   No fever, chills, weight loss, fatigue  No chest pain, palpitations, dyspnea, cough  (+) Intermittent upper abdominal pain, No nausea, vomiting, (+) GERD - Tums PRN  No skin rashes   No headaches  No lower extremity edema  No depression or anxiety      PHYSICAL EXAM:  Friendly Black or  male, in no acute distress; alert and oriented to person, place and time  VITALS: reviewed  HEENT: Sclerae anicteric.   NECK: Supple  LUNGS: Normal  respiratory effort.   ABDOMEN: Flat, soft, nontender.   SKIN: Warm and dry. No jaundice, No obvious rashes. (+) tattoos  NEURO/PSYCH: Normal gate. Memory intact. Thought and speech pattern appropriate. Behavior normal. No depression or anxiety noted.    RECENT LABS:  Lab Results   Component Value Date    WBC 7.35 08/09/2019    HGB 13.9 (L) 08/09/2019     08/09/2019     Lab Results   Component Value Date    INR 1.0 09/12/2018     Lab Results   Component Value Date    AST 25 08/09/2019    ALT 31 08/09/2019    BILITOT 0.3 08/09/2019    ALBUMIN 4.0 08/09/2019    ALKPHOS 59 08/09/2019    CREATININE 1.1 08/09/2019    BUN 20 08/09/2019     08/09/2019    K 4.3 08/09/2019       RECENT IMAGING:  Results for orders placed during the hospital encounter of 01/20/20   US Abdomen Limited    Narrative EXAMINATION:  US ABDOMEN LIMITED    CLINICAL HISTORY:  Chronic viral hepatitis C    TECHNIQUE:  Limited abdominal ultrasound was performed.    COMPARISON:  None.    FINDINGS:  Liver: Normal in size, measuring 16.4 cm. Homogeneous echotexture. No focal hepatic lesions.    Gallbladder: Contains a mobile 0.2 cm gallstone.  No gallbladder distention, wall thickening, or wall hyperemia.  No sonographic Guevara sign.    Biliary system: The common duct is not dilated, measuring 2.0 mm.  No intrahepatic ductal dilatation.    Spleen: Normal size, measuring 9.7 x 2.7 cm.    Pancreas: The visualized portions of pancreas appear normal.    Miscellaneous: No ascites.      Impression No focal hepatic abnormality in this patient with reported history of chronic hepatitis.    Cholelithiasis.    Electronically signed by resident: Remington Muir  Date:    01/20/2020  Time:    09:08    Electronically signed by: Curly Patterson  Date:    01/20/2020  Time:    09:55           ASSESSMENT  60 y.o. Black or  male with:  1. CHRONIC HEPATITIS C, GENOTYPE 1b - treatment naive  -- FibroSURE 6/2019 - A0, F0 // FibroScan 2/2020 - F2  --  lacking Immunity to HAV & HBV    2. GERD - stable on tums    EDUCATION:  Discussed goal of HCV eradication to prevent progression of liver disease.  Discussed use of Epclusa daily x 12 weeks w/ potential side effects of fatigue and headache.     Reviewed limitations on acid suppressant medications due to DDI w/ Epclusa:  -- Antacids - takes TUMS, must be  from Epclusa by 4 hours  -- H2 Receptor Antagonist - not taking  -- PPI - not recommended while on Epclusa  Patient instructed to contact me if experiencing acid related symptoms so further recommendations can be made regarding acid suppression therapy.      Reviewed limitations on statin therapy and Epclusa:  -- Patient taking pravastatin, can continue    Herbal / alternative therapies must be discontinued  Discussed importance of medication adherence and risk of treatment failure / viral resistance if not adherent. Pt has verbalized understanding.        PLAN:  Obtain authorization to treat HCV with Epclusa x 12 weeks  -- Rx will be routed to [facility]  -- Patient will notify me of exact treatment start date so appropriate lab f/u can be scheduled.  Twinrix vaccine in ID    CC: Dave Donaldson NP

## 2020-02-17 ENCOUNTER — TELEPHONE (OUTPATIENT)
Dept: PHARMACY | Facility: CLINIC | Age: 60
End: 2020-02-17

## 2020-02-17 NOTE — TELEPHONE ENCOUNTER
Informed Patient  that [facility] received prescription for Epclusa and prior authorization is required.  [facility] will be back in touch once insurance determination is received.

## 2020-02-24 NOTE — TELEPHONE ENCOUNTER
DOCUMENTATION ONLY:  Prior authorization for Epclusa approved from 2/23/2020 to 5/17/2020 x 12 weeks of treatment.   Case ID# [id number]     Co-pay: $8.40    Patient Assistance IS NOT required.     Forward to clinical pharmacist for consult & shipment.

## 2020-03-02 ENCOUNTER — TELEPHONE (OUTPATIENT)
Dept: PHARMACY | Facility: CLINIC | Age: 60
End: 2020-03-02

## 2020-03-03 NOTE — TELEPHONE ENCOUNTER
Initial Epclusa Consultation:  Initial Epclusa consult completed on 3/3. Epclusa will be shipped on 3/4 to arrive at patient's home on 3/5 via [employer]. $8.40 copay. Patient will start Epclusa on 3/6. Address confirmed, CC on file. Confirmed 2 patient identifiers - name and . Therapy Appropriate.     Epclusa 400/100mg- Take one tablet by mouth daily x 12 weeks  Counseling was reviewed:   1. Patient MUST take Epclusa at the SAME time every day.    2. Patient MUST avoid acid reducers without consulting with myself or provider first. Antacids are to be spaced out at least 4 hours apart from Epclusa.    3. Potential Side effects include: headaches and fatigue.    Headache: Patient may treat with OTC remedies. If Tylenol is used, dose should not exceed 2000mg per day.    4. Medication list reviewed. No DDIs or allergies noted. Patient MUST contact myself or provider prior to starting any new OTC, herbal, or prescription drugs to avoid potential DDIs.     DDI: Medication list reviewed and potential DDIs addressed.   - DDI Pravastatin: Safe to continue. Monitor for s/s of new onset muscle pain/weakness. Patient verbalized understanding.     Discussed the importance of staying well hydrated while on therapy. Compliance stressed - patient to take missed doses as soon as remembered, but NOT to take 2 doses in one day. Patient will report questions or concerns to myself or practitioner. Patient verbalizes understanding. Patient plans to start Epclusa on 3/6.  Consultation included: indication; goals of treatment; administration; storage and handling; side effects; how to handle side effects; the importance of compliance; how to handle missed doses; the importance of laboratory monitoring; the importance of keeping all follow up appointments.  Patient understands to report any medication changes to [facility] and provider. All questions answered and addressed to patients satisfaction. I will f/u with her in 1 week from start, [facility] to  contact patient in 3 weeks for refills.     Martha Peterson, Pharm D  [facility]   [phone]

## 2020-03-05 ENCOUNTER — TELEPHONE (OUTPATIENT)
Dept: HEPATOLOGY | Facility: CLINIC | Age: 60
End: 2020-03-05

## 2020-03-05 DIAGNOSIS — B18.2 CHRONIC HEPATITIS C WITHOUT HEPATIC COMA: Primary | ICD-10-CM

## 2020-03-06 ENCOUNTER — EPISODE CHANGES (OUTPATIENT)
Dept: HEPATOLOGY | Facility: CLINIC | Age: 60
End: 2020-03-06

## 2020-03-06 NOTE — TELEPHONE ENCOUNTER
Pt beginning 12 weeks of Epclusa on 3/6/20  Genet 1b, tx naie  F2    Pls schedule:  - HCV RNA at week 6  - CMP, HCV RNA - SVR12

## 2020-03-06 NOTE — TELEPHONE ENCOUNTER
I spoke with patient and msg from PA Scheuermann relayed and mailed to him.  Labs scheduled; appt notices mailed.

## 2020-03-13 ENCOUNTER — CLINICAL SUPPORT (OUTPATIENT)
Dept: INFECTIOUS DISEASES | Facility: CLINIC | Age: 60
End: 2020-03-13
Payer: MEDICARE

## 2020-03-13 ENCOUNTER — TELEPHONE (OUTPATIENT)
Dept: PHARMACY | Facility: CLINIC | Age: 60
End: 2020-03-13

## 2020-03-13 DIAGNOSIS — B18.2 CHRONIC HEPATITIS C WITHOUT HEPATIC COMA: ICD-10-CM

## 2020-03-13 DIAGNOSIS — Z00.00 PREVENTATIVE HEALTH CARE: ICD-10-CM

## 2020-03-13 DIAGNOSIS — Z23 VACCINE FOR VIRAL HEPATITIS: ICD-10-CM

## 2020-03-13 PROCEDURE — 90636 HEP A/HEP B VACC ADULT IM: CPT | Mod: HCNC,S$GLB,, | Performed by: INTERNAL MEDICINE

## 2020-03-13 PROCEDURE — 90471 IMMUNIZATION ADMIN: CPT | Mod: HCNC,S$GLB,, | Performed by: INTERNAL MEDICINE

## 2020-03-13 PROCEDURE — 90636 HEPATITIS A HEPATITIS B COMBINED VACCINE IM: ICD-10-PCS | Mod: HCNC,S$GLB,, | Performed by: INTERNAL MEDICINE

## 2020-03-13 PROCEDURE — 90471 HEPATITIS A HEPATITIS B COMBINED VACCINE IM: ICD-10-PCS | Mod: HCNC,S$GLB,, | Performed by: INTERNAL MEDICINE

## 2020-03-13 NOTE — TELEPHONE ENCOUNTER
Initial touch base conducted for Epclusa - Name/ confirmed. Confirmed patient started medication as instructed on 3/6. Patient confirms that they are taking Epclusa every day at 7:00 am.  Patient denies skipping or missing doses. Patient reports experiencing no side effects since beginning therapy. Patient reports no new medications, otc remedies, or allergies. Patient reminded of lab appointments. Patient counseled on importance of maintaining adherence and keeping lab appointments which were scheduled. Advised to call [facility] and provider if any issues arise.  No questions or concerns. Aware [facility] will call for refills when patient has 7 days of medication on hand.

## 2020-03-17 ENCOUNTER — TELEPHONE (OUTPATIENT)
Dept: HEPATOLOGY | Facility: CLINIC | Age: 60
End: 2020-03-17

## 2020-03-17 NOTE — TELEPHONE ENCOUNTER
HCV LAB REVIEW  Week 1 of Epclusa, planning on 12 weeks treatment  Genet 1b, tx naive  F2    Pertinent labs:  3/13/20:      pls call pt:  - Labs look good: HCV is down from > 2 million to 230  - Continue Epclusa - 1 pill daily - don't miss any doses.  - Should still have TWO more refills coming from pharmacy  - There is a small chance the virus can return during the 3 months after treatment ends. We will monitor blood for this.     Next labs to see if Hep C is cured are due:  - CMP, HCV RNA - SVR12 - 8/20/20

## 2020-03-27 ENCOUNTER — TELEPHONE (OUTPATIENT)
Dept: PHARMACY | Facility: CLINIC | Age: 60
End: 2020-03-27

## 2020-03-27 NOTE — TELEPHONE ENCOUNTER
Epclusa refill (2 of 3) confirmed and reassessment complete. We will ship Epclusa refill on 3/30 via fedex to arrive on 3/31. $0.00 copay- 004. Confirmed 2 patient identifiers - name and . Therapy appropriate.     Patient has 6 doses of Epclusa remaining and takes it around 8am daily.  Pt reports they are not having any side effects so far. No missed doses, no new medications, no new allergies or health conditions reported at this time. Allergies reviewed and medication reconciliation complete (reviewed and documented in [facility]).  Disease education reviewed (including transmission and prevention). Patient counseled on importance of maintaining adherence and keeping lab appointments which were scheduled. All questions answered and addressed to patients satisfaction. Advised to call [facility] and provider if any issues arise.  Pt verbalized understanding.

## 2020-04-24 ENCOUNTER — TELEPHONE (OUTPATIENT)
Dept: PHARMACY | Facility: CLINIC | Age: 60
End: 2020-04-24

## 2020-04-28 NOTE — TELEPHONE ENCOUNTER
Refill readiness for AG Epclusa confirmed with patient; name/ confirmed; no missed doses; no new medications; no side effects noted; address confirmed for  shipment and  delivery.  Patient states they have 2 doses remaining.     [place] Tinoco, BAILEY.Ph., AAHIVP  Clinical Pharmacist, HIV/HCV  [facility]  Phone: [phone]

## 2020-05-18 ENCOUNTER — TELEPHONE (OUTPATIENT)
Dept: GASTROENTEROLOGY | Facility: CLINIC | Age: 60
End: 2020-05-18

## 2020-05-18 NOTE — LETTER
May 18, 2020    Romero Kwan Robert  [address] [address]             [facility]  [facility], [place]   [facility] [address]  Phone: [phone]  Fax: [phone] Dear Mr. Bowens:    We have attempted to contact you to schedule a screening colonoscopy that was ordered by your doctor. Please contact the office to schedule at [phone].      If you have any questions or concerns, please don't hesitate to call.    Sincerely,        Magdalena Sales MD

## 2020-06-01 DIAGNOSIS — Z01.818 PREOP EXAMINATION: Primary | ICD-10-CM

## 2020-06-01 RX ORDER — POLYETHYLENE GLYCOL 3350, SODIUM SULFATE ANHYDROUS, SODIUM BICARBONATE, SODIUM CHLORIDE, POTASSIUM CHLORIDE 236; 22.74; 6.74; 5.86; 2.97 G/4L; G/4L; G/4L; G/4L; G/4L
4 POWDER, FOR SOLUTION ORAL ONCE
Qty: 1 BOTTLE | Refills: 0 | Status: SHIPPED | OUTPATIENT
Start: 2020-06-01 | End: 2020-06-01

## 2020-06-01 NOTE — TELEPHONE ENCOUNTER
----- Message from Janice Thomas sent at 6/1/2020  2:53 PM CDT -----  Contact: [phone]/pamela Witt  Type:  Patient Returning Call    Who Called: Wife Miryam  Who Left Message for Patient: Samanta Spencer  Does the patient know what this is regarding?: scheduling colonoscopy  Would the patient rather a call back or a response via [facility]?  call  Best Call Back Number: [phone]/self  Additional Information:

## 2020-07-14 PROBLEM — Z12.11 SCREEN FOR COLON CANCER: Status: ACTIVE | Noted: 2020-07-14

## 2020-08-14 DIAGNOSIS — I73.9 PAD (PERIPHERAL ARTERY DISEASE): Primary | ICD-10-CM

## 2020-08-19 ENCOUNTER — INITIAL CONSULT (OUTPATIENT)
Dept: CARDIOLOGY | Facility: CLINIC | Age: 60
End: 2020-08-19
Payer: MEDICARE

## 2020-08-19 ENCOUNTER — HOSPITAL ENCOUNTER (OUTPATIENT)
Dept: CARDIOLOGY | Facility: HOSPITAL | Age: 60
Discharge: HOME | End: 2020-08-19
Attending: INTERNAL MEDICINE
Payer: MEDICARE

## 2020-08-19 VITALS
BODY MASS INDEX: 21.49 KG/M2 | HEIGHT: 69 IN | SYSTOLIC BLOOD PRESSURE: 131 MMHG | OXYGEN SATURATION: 97 % | WEIGHT: 145.06 LBS | DIASTOLIC BLOOD PRESSURE: 60 MMHG | HEART RATE: 56 BPM

## 2020-08-19 DIAGNOSIS — I73.9 PAD (PERIPHERAL ARTERY DISEASE): ICD-10-CM

## 2020-08-19 DIAGNOSIS — Z79.4 TYPE 2 DIABETES MELLITUS WITH DIABETIC PERIPHERAL ANGIOPATHY WITHOUT GANGRENE, WITH LONG-TERM CURRENT USE OF INSULIN: ICD-10-CM

## 2020-08-19 DIAGNOSIS — I10 ESSENTIAL HYPERTENSION: ICD-10-CM

## 2020-08-19 DIAGNOSIS — E11.51 TYPE 2 DIABETES MELLITUS WITH DIABETIC PERIPHERAL ANGIOPATHY WITHOUT GANGRENE, WITH LONG-TERM CURRENT USE OF INSULIN: ICD-10-CM

## 2020-08-19 DIAGNOSIS — I73.9 PERIPHERAL ARTERIAL DISEASE: Primary | ICD-10-CM

## 2020-08-19 DIAGNOSIS — I73.9 PERIPHERAL VASCULAR DISEASE, UNSPECIFIED: ICD-10-CM

## 2020-08-19 DIAGNOSIS — E78.49 OTHER HYPERLIPIDEMIA: ICD-10-CM

## 2020-08-19 PROBLEM — E11.59 TYPE 2 DIABETES MELLITUS WITH CIRCULATORY DISORDER, WITH LONG-TERM CURRENT USE OF INSULIN: Status: ACTIVE | Noted: 2019-08-09

## 2020-08-19 LAB
LEFT ANT TIBIAL SYS PSV: 71 CM/S
LEFT CFA PSV: 127 CM/S
LEFT EXTERNAL ILIAC PSV: 167 CM/S
LEFT PERONEAL SYS PSV: 48 CM/S
LEFT POPLITEAL PSV: 59 CM/S
LEFT POST TIBIAL SYS PSV: 49 CM/S
LEFT PROFUNDA SYS PSV: 132 CM/S
LEFT SUPER FEMORAL DIST SYS PSV: 115 CM/S
LEFT SUPER FEMORAL MID SYS PSV: 95 CM/S
LEFT SUPER FEMORAL OSTIAL SYS PSV: 546 CM/S
LEFT SUPER FEMORAL PROX SYS PSV: 117 CM/S
LEFT TIB/PER TRUNK SYS PSV: 60 CM/S
OHS CV LEFT LOWER EXTREMITY ABI (NO CALC): 0.97
OHS CV RIGHT ABI LOWER EXTREMITY (NO CALC): 1.19
RIGHT ANT TIBIAL SYS PSV: 85 CM/S
RIGHT CFA PSV: 199 CM/S
RIGHT EXTERNAL ILLIAC PSV: 127 CM/S
RIGHT PERONEAL SYS PSV: 47 CM/S
RIGHT POPLITEAL PSV: 102 CM/S
RIGHT POST TIBIAL SYS PSV: 75 CM/S
RIGHT PROFUNDA SYS PSV: 196 CM/S
RIGHT SUPER FEMORAL DIST SYS PSV: 130 CM/S
RIGHT SUPER FEMORAL MID SYS PSV: 148 CM/S
RIGHT SUPER FEMORAL OSTIAL SYS PSV: 193 CM/S
RIGHT SUPER FEMORAL PROX SYS PSV: 148 CM/S
RIGHT TIB/PER TRUNK SYS PSV: 83 CM/S

## 2020-08-19 PROCEDURE — 93925 LOWER EXTREMITY STUDY: CPT | Mod: HCWC

## 2020-08-19 PROCEDURE — 3078F DIAST BP <80 MM HG: CPT | Mod: HCWC,CPTII,S$GLB, | Performed by: INTERNAL MEDICINE

## 2020-08-19 PROCEDURE — 99999 PR PBB SHADOW E&M-EST. PATIENT-LVL III: ICD-10-PCS | Mod: PBBFAC,HCWC,, | Performed by: INTERNAL MEDICINE

## 2020-08-19 PROCEDURE — 99204 OFFICE O/P NEW MOD 45 MIN: CPT | Mod: HCWC,S$GLB,, | Performed by: INTERNAL MEDICINE

## 2020-08-19 PROCEDURE — 3008F BODY MASS INDEX DOCD: CPT | Mod: HCWC,CPTII,S$GLB, | Performed by: INTERNAL MEDICINE

## 2020-08-19 PROCEDURE — 99204 PR OFFICE/OUTPT VISIT, NEW, LEVL IV, 45-59 MIN: ICD-10-PCS | Mod: HCWC,S$GLB,, | Performed by: INTERNAL MEDICINE

## 2020-08-19 PROCEDURE — 3078F PR MOST RECENT DIASTOLIC BLOOD PRESSURE < 80 MM HG: ICD-10-PCS | Mod: HCWC,CPTII,S$GLB, | Performed by: INTERNAL MEDICINE

## 2020-08-19 PROCEDURE — 93925 CV US DOPPLER ARTERIAL LEGS BILATERAL (CUPID ONLY): ICD-10-PCS | Mod: 26,HCWC,, | Performed by: INTERNAL MEDICINE

## 2020-08-19 PROCEDURE — 3075F SYST BP GE 130 - 139MM HG: CPT | Mod: HCWC,CPTII,S$GLB, | Performed by: INTERNAL MEDICINE

## 2020-08-19 PROCEDURE — 93925 LOWER EXTREMITY STUDY: CPT | Mod: 26,HCWC,, | Performed by: INTERNAL MEDICINE

## 2020-08-19 PROCEDURE — 3075F PR MOST RECENT SYSTOLIC BLOOD PRESS GE 130-139MM HG: ICD-10-PCS | Mod: HCWC,CPTII,S$GLB, | Performed by: INTERNAL MEDICINE

## 2020-08-19 PROCEDURE — 99999 PR PBB SHADOW E&M-EST. PATIENT-LVL III: CPT | Mod: PBBFAC,HCWC,, | Performed by: INTERNAL MEDICINE

## 2020-08-19 PROCEDURE — 3008F PR BODY MASS INDEX (BMI) DOCUMENTED: ICD-10-PCS | Mod: HCWC,CPTII,S$GLB, | Performed by: INTERNAL MEDICINE

## 2020-08-19 RX ORDER — BENAZEPRIL HYDROCHLORIDE 40 MG/1
TABLET ORAL
COMMUNITY

## 2020-08-19 RX ORDER — ERYTHROMYCIN 5 MG/G
OINTMENT OPHTHALMIC
COMMUNITY

## 2020-08-19 NOTE — LETTER
August 19, 2020      Dave Donaldson, ARELI  [address]  [address]  [facility] [address]           [facility] 3rd Fl  [address]  [facility] [address]  Phone: [phone]          Patient: Romero Bowens Jr.   MR Number: [id number]   YOB: 1960   Date of Visit: 8/19/2020       Dear Dave Donaldson:    Thank you for referring Romero Bowens to me for evaluation. Attached you will find relevant portions of my assessment and plan of care.    If you have questions, please do not hesitate to call me. I look forward to following Romero Bowens along with you.    Sincerely,    Mode Johnson MD    Enclosure  CC:  No Recipients    If you would like to receive this communication electronically, please contact [email or web address] or [phone] to request more information on [email or web address] Link access.    For providers and/or their staff who would like to refer a patient to [facility], please contact us through our one-stop-shop provider referral line, [facility], at [phone].    If you feel you have received this communication in error or would no longer like to receive these types of communications, please e-mail [email or web address]

## 2020-08-19 NOTE — ASSESSMENT & PLAN NOTE
Uncontrolled  Continue insulin and metformin  He might benefit from evaluation for neuropathy  Follow-up with primary care doctor

## 2020-08-19 NOTE — PROGRESS NOTES
Interventional Cardiology Office Visit     PATIENT: Romero Bowens Jr.  MRN: [id number]   PCP: Dave Donaldson NP       Dear  Dave Donaldson NP    Thank you for asking me to see your patient Mr. Romero Bowens Jr. today for LOWER EXTREMITY PAIN.     Chief Complaint   Patient presents with    Consult    Peripheral Arterial Disease         History of Present Illness:   Mr. Romero Bowens Jr. is a 60 y.o. male who I am evaluating for patient arterial disease.  He is experiencing pain in both legs for more than a year.  The pain is mostly over right leg, and involved knee and foot.  Pain is gradually worsening, is burning in character and sometimes present during the rest also exertional.  He has history of type diabetes type 2, hyperlipidemia, hypertension.  His diabetes is not controlled, A1c was elevated, he is on insulin and metformin.  He takes pravastatin for hyperlipidemia and benazepril for high blood pressure.   He otherwise denies history of heart failure or coronary artery disease, stroke.    Past Medical History:   Diagnosis Date    Cervicalgia     Chronic hepatitis C     Diabetes mellitus     Hypercholesteremia     Hypertension     Jaw pain     Lipoma of back     Lumbago     Premature beats, unspecified     Sciatica     Scoliosis (and kyphoscoliosis), idiopathic     Undiagnosed cardiac murmurs        Review of patient's allergies indicates:  No Known Allergies    Outpatient Medications Marked as Taking for the 8/19/20 encounter (Initial consult) with Mode Johnson MD   Medication Sig Dispense Refill    benazepriL (LOTENSIN) 40 MG tablet benazepril 40 mg tablet      erythromycin (ROMYCIN) ophthalmic ointment erythromycin 5 mg/gram (0.5 %) eye ointment   APPLY OINTMENT ONTO EACH EYE AT BEDTIME      insulin (LANTUS SOLOSTAR U-100 INSULIN) glargine 100 units/mL (3mL) SubQ pen Inject 12 Units into the skin once daily.       pravastatin (PRAVACHOL) 10 MG tablet Take 10 mg by mouth once daily.          History reviewed. No pertinent family history.    Social History:  Social History     Tobacco Use    Smoking status: Never Smoker    Smokeless tobacco: Never Used   Substance Use Topics    Alcohol use: No         Review of Systems:  Pertinent positive and negative as mentioned below, otherwise a full review of systems was negative.    General/Constitutional: No Weight loss or gain, No fevers, No chills   Skin: No Rash, No itching   HEENT: No Headaches, No vertigo, No lightheadedness, No double vision,   Cardiovascular: No Chest pain, No palpitations,  No syncope,   Respiratory: No Shortness of breath, No wheezing, No stridor  Gastrointestinal: No poor Appetite, No dysphagia, No indigestion, No vomiting, No constipation, No diarrhea  Genitourinary: No Urgency, No frequency, No dysuria, No nocturia, No hematuria   Musculoskeletal: Pain in both leg   Neurologic: No Convulsions, No paralyses, No tremor,No incoordination, No parathesias,    Psychiatry: No Depression, No Anxiety       Vitals:  BP: 131/60           Pulse: (!) 56            Physical Exam:  Alert, and oriented  Skin: No cutaneous findings consistent with hyperlipidemia. Skin dry without rash, jaundice.  Mouth: Oral mucosa moist without lesions, telangiectasias.  Neck: Supple without cervical lymphadenopathy.  No JVD, thyroid gland enlargement.  Normal carotid upstrokes bilaterally without bruits.  Cardiac: RRR without murmur, S3, S4, rub  Lungs: CTA bilaterally without rales, rhonchi, wheeze, rub  Abdomen: Soft, flat, non-tender, normal active bowel sounds throughout.    Extremities: No ischemia, cyanosis, ulceration or intertrigo.  No edema, venous varicosities, or stasis pigmentation changes bilaterally.    Pulses:  radial 2+, femoral 2+ without bruits, popliteal 2+, dorsalis pedis 1+ and posterior tibial pulses 1+ and symmetric.  No pallor with elevation.  No rubor with dependency.       Data/results:  Basic Metabolic Panel   Lab Results    Component Value Date     08/09/2019    K 4.3 08/09/2019    CO2 28 08/09/2019    BUN 20 08/09/2019     Liver Function Tests  [unfilled]  No components found for: GHBA1C  TSH   Date Value Ref Range Status   09/12/2018 0.446 0.400 - 4.000 uIU/mL Final     Lab Results   Component Value Date    WBC 7.35 08/09/2019    RBC 4.71 08/09/2019    HGB 13.9 (L) 08/09/2019    HCT 42.0 08/09/2019     08/09/2019     Lab Results   Component Value Date    INR 1.0 09/12/2018       Images     Arterial duplex: personally reviewed.    · Bilateral resting ROE's are normal.  · Right CFA, PFA and ostial SFA with mild to moderate PAD.  · Left ostial SFA severe stenosis.      Assessment and Plan:     is a 60 y.o. male who, history of type diabetes type 2, hyperlipidemia, hypertension,  presented for evaluation for peripheral arterial disease.     Peripheral arterial disease  His leg pain most likely related to diabetic neuropathy  Arterail duplex showed, bilateral resting ROE's are normal. Right CFA, PFA and ostial SFA with mild to moderate PAD. Left ostial SFA severe stenosis.  Good distal palpable peripheral pulses  Will start aspirin 81 mg daily  He will continue medication for hypertension, diabetes hyperlipidemia  Strict control of diabetes  We will order a CT angiography to evaluate the SFA stenosis    Other hyperlipidemia  Will continue statin    Type 2 diabetes mellitus with circulatory disorder, with long-term current use of insulin  Uncontrolled  Continue insulin and metformin  He might benefit from evaluation for neuropathy  Follow-up with primary care doctor       Essential hypertension  Controlled   continue benazepril      Follow up:  Follow up in about 5 months (around 1/19/2021).        Mode Johnson MD   Interventional Cardiology  Vascular Intervention   [facility] [address], [place] [address]  O: [phone]  F: [phone]

## 2020-08-19 NOTE — ASSESSMENT & PLAN NOTE
His leg pain most likely related to diabetic neuropathy  Arterail duplex showed, bilateral resting ROE's are normal. Right CFA, PFA and ostial SFA with mild to moderate PAD. Left ostial SFA severe stenosis.  Good distal palpable peripheral pulses  Will start aspirin 81 mg daily  He will continue medication for hypertension, diabetes hyperlipidemia  Strict control of diabetes  We will order a CT angiography to evaluate the SFA stenosis

## 2020-08-20 ENCOUNTER — CLINICAL SUPPORT (OUTPATIENT)
Dept: INFECTIOUS DISEASES | Facility: CLINIC | Age: 60
End: 2020-08-20
Payer: MEDICARE

## 2020-08-20 DIAGNOSIS — Z00.00 PREVENTATIVE HEALTH CARE: ICD-10-CM

## 2020-08-20 DIAGNOSIS — B18.2 CHRONIC HEPATITIS C WITHOUT HEPATIC COMA: ICD-10-CM

## 2020-08-20 DIAGNOSIS — Z23 VACCINE FOR VIRAL HEPATITIS: ICD-10-CM

## 2020-08-20 PROCEDURE — 90471 IMMUNIZATION ADMIN: CPT | Mod: HCWC,S$GLB,, | Performed by: INTERNAL MEDICINE

## 2020-08-20 PROCEDURE — 90636 HEP A/HEP B VACC ADULT IM: CPT | Mod: HCWC,S$GLB,, | Performed by: INTERNAL MEDICINE

## 2020-08-20 PROCEDURE — 90471 HEPATITIS A HEPATITIS B COMBINED VACCINE IM: ICD-10-PCS | Mod: HCWC,S$GLB,, | Performed by: INTERNAL MEDICINE

## 2020-08-20 PROCEDURE — 90636 HEPATITIS A HEPATITIS B COMBINED VACCINE IM: ICD-10-PCS | Mod: HCWC,S$GLB,, | Performed by: INTERNAL MEDICINE

## 2020-08-26 ENCOUNTER — HOSPITAL ENCOUNTER (OUTPATIENT)
Dept: RADIOLOGY | Facility: HOSPITAL | Age: 60
Discharge: HOME | End: 2020-08-26
Attending: INTERNAL MEDICINE
Payer: MEDICARE

## 2020-08-26 DIAGNOSIS — I73.9 PERIPHERAL VASCULAR DISEASE, UNSPECIFIED: ICD-10-CM

## 2020-08-26 LAB
CREAT SERPL-MCNC: 1 MG/DL (ref 0.5–1.4)
SAMPLE: NORMAL

## 2020-08-26 PROCEDURE — 75635 CT ANGIO ABDOMINAL ARTERIES: CPT | Mod: TC,HCWC

## 2020-08-26 PROCEDURE — 75635 CTA RUNOFF ABD PEL BILAT LOWER EXT: ICD-10-PCS | Mod: 26,HCWC,, | Performed by: RADIOLOGY

## 2020-08-26 PROCEDURE — 75635 CT ANGIO ABDOMINAL ARTERIES: CPT | Mod: 26,HCWC,, | Performed by: RADIOLOGY

## 2020-08-26 PROCEDURE — 25500020 PHARM REV CODE 255: Mod: HCWC | Performed by: INTERNAL MEDICINE

## 2020-08-26 RX ADMIN — IOHEXOL 125 ML: 350 INJECTION, SOLUTION INTRAVENOUS at 04:08

## 2020-08-28 DIAGNOSIS — I73.9 PAD (PERIPHERAL ARTERY DISEASE): Primary | ICD-10-CM

## 2020-09-01 ENCOUNTER — LAB VISIT (OUTPATIENT)
Dept: LAB | Facility: HOSPITAL | Age: 60
End: 2020-09-01
Attending: FAMILY MEDICINE
Payer: MEDICARE

## 2020-09-01 DIAGNOSIS — B18.2 CHRONIC HEPATITIS C WITHOUT HEPATIC COMA: ICD-10-CM

## 2020-09-01 LAB
ALBUMIN SERPL BCP-MCNC: 3.8 G/DL (ref 3.5–5.2)
ALP SERPL-CCNC: 71 U/L (ref 55–135)
ALT SERPL W/O P-5'-P-CCNC: 12 U/L (ref 10–44)
ANION GAP SERPL CALC-SCNC: 10 MMOL/L (ref 8–16)
AST SERPL-CCNC: 15 U/L (ref 10–40)
BILIRUB SERPL-MCNC: 0.3 MG/DL (ref 0.1–1)
BUN SERPL-MCNC: 9 MG/DL (ref 8–23)
CALCIUM SERPL-MCNC: 9.3 MG/DL (ref 8.7–10.5)
CHLORIDE SERPL-SCNC: 105 MMOL/L (ref 95–110)
CO2 SERPL-SCNC: 27 MMOL/L (ref 23–29)
CREAT SERPL-MCNC: 1.4 MG/DL (ref 0.5–1.4)
EST. GFR  (AFRICAN AMERICAN): >60 ML/MIN/1.73 M^2
EST. GFR  (NON AFRICAN AMERICAN): 53 ML/MIN/1.73 M^2
GLUCOSE SERPL-MCNC: 196 MG/DL (ref 70–110)
POTASSIUM SERPL-SCNC: 3.5 MMOL/L (ref 3.5–5.1)
PROT SERPL-MCNC: 6.9 G/DL (ref 6–8.4)
SODIUM SERPL-SCNC: 142 MMOL/L (ref 136–145)

## 2020-09-01 PROCEDURE — 36415 COLL VENOUS BLD VENIPUNCTURE: CPT | Mod: HCWC

## 2020-09-01 PROCEDURE — 87522 HEPATITIS C REVRS TRNSCRPJ: CPT | Mod: HCWC

## 2020-09-01 PROCEDURE — 80053 COMPREHEN METABOLIC PANEL: CPT | Mod: HCWC

## 2020-09-04 LAB
HCV RNA SERPL NAA+PROBE-LOG IU: <1.08 LOG (10) IU/ML
HCV RNA SERPL QL NAA+PROBE: NOT DETECTED IU/ML
HCV RNA SPEC NAA+PROBE-ACNC: <12 IU/ML

## 2020-09-07 ENCOUNTER — TELEPHONE (OUTPATIENT)
Dept: HEPATOLOGY | Facility: CLINIC | Age: 60
End: 2020-09-07

## 2020-09-07 DIAGNOSIS — Z86.19 HISTORY OF HEPATITIS C: ICD-10-CM

## 2020-09-07 DIAGNOSIS — Z86.19 HISTORY OF HEPATITIS C: Primary | ICD-10-CM

## 2020-09-07 NOTE — TELEPHONE ENCOUNTER
HCV LAB REVIEW  Completed 12 weeks of Epclusa, 6/2020  Genet 1b, tx naive  F2    Pertinent labs:  9/1/20  HCV neg  CMP stable    These labs document SVR12 following successful HCV treatment with Epclusa    please tell patient:  1.) Lab test shows there is NO Hepatitis C in the blood. This means the Hepatitis C is cured!!  We do not expect the virus to return.  This does not give protection from Hepatitis C and patient could be infected again if ever exposed to the virus again.      Please schedule   - HCV RNA in 6 months

## 2020-09-09 NOTE — TELEPHONE ENCOUNTER
I spoke with patient and msg from PA Scheuermann relayed and mailed to him.  Quant scheduleld 3/2/21; appt notice mailed.

## 2020-09-11 ENCOUNTER — HOSPITAL ENCOUNTER (EMERGENCY)
Facility: HOSPITAL | Age: 60
Discharge: HOME | End: 2020-09-11
Attending: EMERGENCY MEDICINE
Payer: MEDICARE

## 2020-09-11 VITALS
WEIGHT: 145 LBS | TEMPERATURE: 99 F | SYSTOLIC BLOOD PRESSURE: 144 MMHG | OXYGEN SATURATION: 100 % | BODY MASS INDEX: 21.48 KG/M2 | RESPIRATION RATE: 18 BRPM | HEART RATE: 88 BPM | DIASTOLIC BLOOD PRESSURE: 80 MMHG | HEIGHT: 69 IN

## 2020-09-11 DIAGNOSIS — R53.83 FATIGUE: Primary | ICD-10-CM

## 2020-09-11 DIAGNOSIS — R06.02 SHORTNESS OF BREATH: ICD-10-CM

## 2020-09-11 LAB
ALBUMIN SERPL BCP-MCNC: 3.7 G/DL (ref 3.5–5.2)
ALP SERPL-CCNC: 70 U/L (ref 55–135)
ALT SERPL W/O P-5'-P-CCNC: 7 U/L (ref 10–44)
ANION GAP SERPL CALC-SCNC: 11 MMOL/L (ref 8–16)
AST SERPL-CCNC: 10 U/L (ref 10–40)
BACTERIA #/AREA URNS HPF: NORMAL /HPF
BASOPHILS # BLD AUTO: 0.02 K/UL (ref 0–0.2)
BASOPHILS NFR BLD: 0.3 % (ref 0–1.9)
BILIRUB SERPL-MCNC: 0.3 MG/DL (ref 0.1–1)
BILIRUB UR QL STRIP: NEGATIVE
BUN SERPL-MCNC: 15 MG/DL (ref 8–23)
CALCIUM SERPL-MCNC: 9.1 MG/DL (ref 8.7–10.5)
CHLORIDE SERPL-SCNC: 102 MMOL/L (ref 95–110)
CLARITY UR: CLEAR
CO2 SERPL-SCNC: 26 MMOL/L (ref 23–29)
COLOR UR: YELLOW
CREAT SERPL-MCNC: 1.2 MG/DL (ref 0.5–1.4)
DIFFERENTIAL METHOD: NORMAL
EOSINOPHIL # BLD AUTO: 0.1 K/UL (ref 0–0.5)
EOSINOPHIL NFR BLD: 0.8 % (ref 0–8)
ERYTHROCYTE [DISTWIDTH] IN BLOOD BY AUTOMATED COUNT: 12.8 % (ref 11.5–14.5)
EST. GFR  (AFRICAN AMERICAN): >60 ML/MIN/1.73 M^2
EST. GFR  (NON AFRICAN AMERICAN): >60 ML/MIN/1.73 M^2
GLUCOSE SERPL-MCNC: 192 MG/DL (ref 70–110)
GLUCOSE UR QL STRIP: ABNORMAL
HCT VFR BLD AUTO: 46.5 % (ref 40–54)
HGB BLD-MCNC: 15.8 G/DL (ref 14–18)
HGB UR QL STRIP: ABNORMAL
HYALINE CASTS #/AREA URNS LPF: 0 /LPF
IMM GRANULOCYTES # BLD AUTO: 0.03 K/UL (ref 0–0.04)
IMM GRANULOCYTES NFR BLD AUTO: 0.4 % (ref 0–0.5)
KETONES UR QL STRIP: NEGATIVE
LEUKOCYTE ESTERASE UR QL STRIP: NEGATIVE
LYMPHOCYTES # BLD AUTO: 3 K/UL (ref 1–4.8)
LYMPHOCYTES NFR BLD: 38.7 % (ref 18–48)
MCH RBC QN AUTO: 30.3 PG (ref 27–31)
MCHC RBC AUTO-ENTMCNC: 34 G/DL (ref 32–36)
MCV RBC AUTO: 89 FL (ref 82–98)
MICROSCOPIC COMMENT: NORMAL
MONOCYTES # BLD AUTO: 0.5 K/UL (ref 0.3–1)
MONOCYTES NFR BLD: 6.4 % (ref 4–15)
NEUTROPHILS # BLD AUTO: 4.2 K/UL (ref 1.8–7.7)
NEUTROPHILS NFR BLD: 53.4 % (ref 38–73)
NITRITE UR QL STRIP: NEGATIVE
NRBC BLD-RTO: 0 /100 WBC
PH UR STRIP: 6 [PH] (ref 5–8)
PLATELET # BLD AUTO: 244 K/UL (ref 150–350)
PMV BLD AUTO: 12.2 FL (ref 9.2–12.9)
POCT GLUCOSE: 201 MG/DL (ref 70–110)
POTASSIUM SERPL-SCNC: 4.7 MMOL/L (ref 3.5–5.1)
PROT SERPL-MCNC: 7 G/DL (ref 6–8.4)
PROT UR QL STRIP: ABNORMAL
RBC # BLD AUTO: 5.22 M/UL (ref 4.6–6.2)
RBC #/AREA URNS HPF: 1 /HPF (ref 0–4)
SARS-COV-2 RDRP RESP QL NAA+PROBE: NEGATIVE
SODIUM SERPL-SCNC: 139 MMOL/L (ref 136–145)
SP GR UR STRIP: >=1.03 (ref 1–1.03)
SQUAMOUS #/AREA URNS HPF: 3 /HPF
TROPONIN I SERPL DL<=0.01 NG/ML-MCNC: 0.01 NG/ML (ref 0–0.03)
URN SPEC COLLECT METH UR: ABNORMAL
UROBILINOGEN UR STRIP-ACNC: 1 EU/DL
WBC # BLD AUTO: 7.76 K/UL (ref 3.9–12.7)
WBC #/AREA URNS HPF: 0 /HPF (ref 0–5)
YEAST URNS QL MICRO: NORMAL

## 2020-09-11 PROCEDURE — 96374 THER/PROPH/DIAG INJ IV PUSH: CPT | Mod: HCWC

## 2020-09-11 PROCEDURE — 82962 GLUCOSE BLOOD TEST: CPT | Mod: HCWC

## 2020-09-11 PROCEDURE — 93010 ELECTROCARDIOGRAM REPORT: CPT | Mod: HCWC,,, | Performed by: INTERNAL MEDICINE

## 2020-09-11 PROCEDURE — 81000 URINALYSIS NONAUTO W/SCOPE: CPT | Mod: HCWC

## 2020-09-11 PROCEDURE — U0002 COVID-19 LAB TEST NON-CDC: HCPCS | Mod: HCWC

## 2020-09-11 PROCEDURE — 80053 COMPREHEN METABOLIC PANEL: CPT | Mod: HCWC

## 2020-09-11 PROCEDURE — 85025 COMPLETE CBC W/AUTO DIFF WBC: CPT | Mod: HCWC

## 2020-09-11 PROCEDURE — 25000003 PHARM REV CODE 250: Mod: HCWC | Performed by: PHYSICIAN ASSISTANT

## 2020-09-11 PROCEDURE — 84484 ASSAY OF TROPONIN QUANT: CPT | Mod: HCWC

## 2020-09-11 PROCEDURE — 99285 EMERGENCY DEPT VISIT HI MDM: CPT | Mod: 25,HCWC

## 2020-09-11 PROCEDURE — 93005 ELECTROCARDIOGRAM TRACING: CPT | Mod: HCWC

## 2020-09-11 PROCEDURE — 93010 EKG 12-LEAD: ICD-10-PCS | Mod: HCWC,,, | Performed by: INTERNAL MEDICINE

## 2020-09-11 PROCEDURE — 96361 HYDRATE IV INFUSION ADD-ON: CPT | Mod: HCWC

## 2020-09-11 PROCEDURE — 63600175 PHARM REV CODE 636 W HCPCS: Mod: HCWC | Performed by: PHYSICIAN ASSISTANT

## 2020-09-11 RX ORDER — ONDANSETRON 2 MG/ML
4 INJECTION INTRAMUSCULAR; INTRAVENOUS
Status: COMPLETED | OUTPATIENT
Start: 2020-09-11 | End: 2020-09-11

## 2020-09-11 RX ORDER — ONDANSETRON 4 MG/1
4 TABLET, FILM COATED ORAL EVERY 6 HOURS
Qty: 12 TABLET | Refills: 0 | Status: SHIPPED | OUTPATIENT
Start: 2020-09-11 | End: 2021-06-09

## 2020-09-11 RX ADMIN — ONDANSETRON 4 MG: 2 INJECTION INTRAMUSCULAR; INTRAVENOUS at 01:09

## 2020-09-11 RX ADMIN — SODIUM CHLORIDE 500 ML: 0.9 INJECTION, SOLUTION INTRAVENOUS at 01:09

## 2020-09-11 NOTE — ED PROVIDER NOTES
Encounter Date: 9/11/2020       History     Chief Complaint   Patient presents with    Fatigue     pt. reports worsening generalized weakness/fatigue for several days. vomited once this morning. hypoglycemic episode 4 days ago, self corrected.      60-year-old male presents to ED with complaint of 4 day history of generalized fatigue and loss of appetite, along with intermittent abdominal pain, nausea, from 1 episode of emesis this morning.  Reports the symptoms began shortly after working outdoors having hypoglycemic episode 4 days ago while at home, quickly resolved with juice. Patient repeatedly states he thinks he is dehydrated. He denies fever, chills, diarrhea, chest pain, shortness of breath, changes in taste or smell, sick contacts, HA, dizziness, lightheadedness, urinary complaints, diarrhea.  He has not tried any medications for symptoms.  No other acute complaints at this time.       The history is provided by the patient.     Review of patient's allergies indicates:  No Known Allergies  Past Medical History:   Diagnosis Date    Cervicalgia     Diabetes mellitus     History of hepatitis C, s/p successful Rx w/ cure (SVR12 - 9/2020)     Hypercholesteremia     Hypertension     Jaw pain     Lipoma of back     Lumbago     Premature beats, unspecified     Sciatica     Scoliosis (and kyphoscoliosis), idiopathic     Undiagnosed cardiac murmurs      Past Surgical History:   Procedure Laterality Date    COLONOSCOPY N/A 7/14/2020    Procedure: COLONOSCOPY;  Surgeon: Magdalena Sales MD;  Location: [facility];  Service: Endoscopy;  Laterality: N/A;     History reviewed. No pertinent family history.  Social History     Tobacco Use    Smoking status: Never Smoker    Smokeless tobacco: Never Used   Substance Use Topics    Alcohol use: No    Drug use: Yes     Types: Marijuana     Comment: daily     Review of Systems   Constitutional: Negative for appetite change, chills and fever.   HENT: Negative  for congestion and sore throat.    Eyes: Negative for visual disturbance.   Respiratory: Negative for cough and shortness of breath.    Cardiovascular: Negative for chest pain.   Gastrointestinal: Positive for abdominal pain, nausea and vomiting. Negative for abdominal distention and diarrhea.   Genitourinary: Negative for difficulty urinating and dysuria.   Musculoskeletal: Negative for back pain, gait problem, joint swelling and neck stiffness.   Neurological: Negative for dizziness, syncope, weakness, light-headedness and headaches.   Hematological: Does not bruise/bleed easily.       Physical Exam     Initial Vitals [09/11/20 1129]   BP Pulse Resp Temp SpO2   (!) 162/72 83 20 98.9 °F (37.2 °C) 100 %      MAP       --         Physical Exam    Nursing note and vitals reviewed.  Constitutional: He appears well-developed and well-nourished. He is cooperative.  Non-toxic appearance. He does not have a sickly appearance. He does not appear ill. No distress.   Well appearing. Thin   HENT:   Head: Normocephalic and atraumatic.   Eyes: Conjunctivae and EOM are normal.   Neck: Normal range of motion. Neck supple.   Cardiovascular: Normal rate, regular rhythm and normal heart sounds.   Pulmonary/Chest: Effort normal and breath sounds normal. He has no wheezes. He exhibits no tenderness.   Abdominal: Soft. Normal appearance and bowel sounds are normal. He exhibits no distension. There is no abdominal tenderness. There is no rebound and no CVA tenderness.   Musculoskeletal: No tenderness.   Neurological: He is alert and oriented to person, place, and time. GCS score is 15. GCS eye subscore is 4. GCS verbal subscore is 5. GCS motor subscore is 6.   Skin: Skin is warm and dry.   Psychiatric: He has a normal mood and affect. His speech is normal and behavior is normal. Judgment and thought content normal. Cognition and memory are normal.         ED Course   Procedures  Labs Reviewed   URINALYSIS, REFLEX TO URINE CULTURE -  Abnormal; Notable for the following components:       Result Value    Specific Gravity, UA >=1.030 (*)     Protein, UA 1+ (*)     Glucose, UA 4+ (*)     Occult Blood UA 1+ (*)     All other components within normal limits    Narrative:     Specimen Source->Urine   COMPREHENSIVE METABOLIC PANEL - Abnormal; Notable for the following components:    Glucose 192 (*)     ALT 7 (*)     All other components within normal limits   POCT GLUCOSE - Abnormal; Notable for the following components:    POCT Glucose 201 (*)     All other components within normal limits   CBC W/ AUTO DIFFERENTIAL   TROPONIN I   SARS-COV-2 RNA AMPLIFICATION, QUAL   URINALYSIS MICROSCOPIC    Narrative:     Specimen Source->Urine          Imaging Results          X-Ray Chest PA And Lateral (Final result)  Result time 09/11/20 13:05:59    Final result by Bjorn Laura MD (09/11/20 13:05:59)                 Impression:      No convincing radiographic evidence of pneumonia or other source of shortness of breath, noting that early/mild viral pneumonia may be radiographically occult.  Specifically, no focal consolidation.      Electronically signed by: Bjorn Laura MD  Date:    09/11/2020  Time:    13:05             Narrative:    EXAMINATION:  XR CHEST PA AND LATERAL    CLINICAL HISTORY:  Shortness of breath    TECHNIQUE:  PA and lateral views of the chest were performed.    COMPARISON:  Chest radiograph 05/22/2018    FINDINGS:  No detrimental change.The lungs are clear, with normal appearance of pulmonary vasculature and no pleural effusion or pneumothorax.    The cardiac silhouette is normal in size. The hilar and mediastinal contours are within normal limits for age noting calcification at the arch.    Osseous structures appear stable without acute process seen.  EKG stickers overlie the chest.                    ED Interpretation by Santos Espinoza MD (09/11/20 13:03:54, [facility], Emergency Medicine)    No acute pulmonary process as  independently interpreted by me, Dr. Santos Espinoza                               Medical Decision Making:   Differential Diagnosis:   Dehydration, electrolyte abnormality, FELI, UTI/pyelonephritis, Viral, infection, URI, constipation, ACS/PE  ED Management:  Labs, CXR, EKG, Trop, UA, COVID    Patient presents with complaint of fatigue, mild abd discomfort, appetite loss, nausea, one episode of emesis this morning. Exam unremarkable with no abd tenderness to palpation.  No associated CP or SOB. EKG NSR. Trop WNL. I do not suspect ACS event. PERC score 1 (age), I do not suspect PE. Labs grossly WNL. Patient reports symptoms resolved with IVF and Zofran. Encouraged oral hydration, follow up with PCP, ED return precautions. Patient understands instructions and agrees with plan.     Other:   I have discussed this case with another health care provider.       <> Summary of the Discussion: Patient discussed with Dr. Espinoza, who agrees with ED course and dispo                   ED Course as of Sep 11 1821   Fri Sep 11, 2020   1158 EKG:  Normal sinus rhythm at 75 bpm, right axis, nl intervals, no hypertrophy, no ST-T changes as read by me (Dr. Espinoza).  No STEMI    [NP]   1304 No acute pulmonary process as independently interpreted by me, Dr. Santos Espinoza    [NP]      ED Course User Index  [NP] Santos Espinoza MD            Clinical Impression:       ICD-10-CM ICD-9-CM   1. Fatigue  R53.83 780.79   2. Shortness of breath  R06.02 786.05             ED Disposition Condition    Discharge Stable        ED Prescriptions     Medication Sig Dispense Start Date End Date Auth. Provider    ondansetron (ZOFRAN) 4 MG tablet Take 1 tablet (4 mg total) by mouth every 6 (six) hours. 12 tablet 9/11/2020  Andrae Dejesus PA-C        Follow-up Information     Follow up With Specialties Details Why Contact Info    Dave Donaldson NP Nephrology In 1 week  [address]  [address]  [facility] [id number] [phone]                                          Andrae Dejesus PA-C  09/11/20 182

## 2020-09-11 NOTE — FIRST PROVIDER EVALUATION
Emergency Department TeleTriage Encounter Note      CHIEF COMPLAINT    Chief Complaint   Patient presents with    Fatigue     pt. reports worsening generalized weakness/fatigue for several days. vomited once this morning. hypoglycemic episode 4 days ago, self corrected.        VITAL SIGNS   Initial Vitals [09/11/20 1129]   BP Pulse Resp Temp SpO2   (!) 162/72 83 20 98.9 °F (37.2 °C) 100 %      MAP       --            ALLERGIES    Review of patient's allergies indicates:  No Known Allergies    PROVIDER TRIAGE NOTE  Pt is a 60 yr old male with hx of HTN, HLD, and DM who presents to the ED with fatigue and weakness.  Pt states he has not felt well over the last week.  Reports generalized fatigue.  Reports feeling very tired.  States he did have a hypoglycemic episode a couple of days ago, and he drank some juice which corrected the low blood sugar, but since then he has not felt right.  Reports shortness of breath on exertion.      ORDERS  Labs Reviewed   CBC W/ AUTO DIFFERENTIAL   COMPREHENSIVE METABOLIC PANEL   URINALYSIS, REFLEX TO URINE CULTURE   TROPONIN I   POCT GLUCOSE MONITORING CONTINUOUS       ED Orders (720h ago, onward)    Start Ordered     Status Ordering Provider    09/11/20 1146 09/11/20 1145  X-Ray Chest PA And Lateral  1 time imaging      Ordered MILTON TALLEY    09/11/20 1146 09/11/20 1145  Cardiac Monitoring - Adult  Continuous     Comments: Notify Physician If:    Ordered MILTON TALLEY    09/11/20 1146 09/11/20 1145  Pulse Oximetry Continuous  Continuous      Ordered MILTON TALLEY    09/11/20 1145 09/11/20 1145  POCT glucose  Once      Ordered MILTON TALLEY    09/11/20 1145 09/11/20 1145  Saline lock IV  Once      Ordered MILTON TALLEY    09/11/20 1145 09/11/20 1145  CBC auto differential  STAT  Collect    Ordered MILTON TALLEY    09/11/20 1145 09/11/20 1145  Comprehensive metabolic panel  STAT  Collect     Ordered MILTON TALLEY.    09/11/20 1145 09/11/20 1145  Urinalysis, Reflex to Urine Culture Urine, Clean Catch  STAT      Ordered MILTON TALLEY.    09/11/20 1145 09/11/20 1145  EKG 12-lead  Once      Ordered MILTON TALLEY.    09/11/20 1145 09/11/20 1145  Troponin I  STAT  Collect    Ordered MILTON TALLEY            Virtual Visit Note: The provider triage portion of this emergency department evaluation and documentation was performed via [employer], a HIPAA-compliant telemedicine application, in concert with a tele-presenter in the room. A face to face patient evaluation with one of my colleagues will occur once the patient is placed in an emergency department room.      DISCLAIMER: This note was prepared with [employer] voice recognition transcription software. Garbled syntax, mangled pronouns, and other bizarre constructions may be attributed to that software system.

## 2020-09-11 NOTE — DISCHARGE INSTRUCTIONS
Monitor symptoms closely. Drink lots of fluids. Follow up with your doctor as we discussed.   Return to closest emergency department if symptoms do not improve, change, worsen, or for any new concerns.

## 2020-09-11 NOTE — ED NOTES
Patient identifiers for Romero Bowens  checked and correct.    Pt presents to the ED c/o abdominal pain rated 8/10, nausea with one incidence of vomiting this am, dizziness and generalized weakness, no BM since last week reports not urinating as much but does not have any other urinary symptoms     LOC: The patient is awake, alert and aware of environment with an appropriate affect, the patient is oriented x 3 and speaking appropriately.  APPEARANCE: Patient resting comfortably and in no acute distress  SKIN: The skin is warm dry intact   MUSCULOSKELETAL: Pt reports generalized weakness for the past few days .  RESPIRATORY: Airway is open and patent, respirations are spontaneous  CARDIAC: Patient has a normal rate and rhythm  ABDOMEN:C/o abdominal pain, nausea and one episode of vomiting on this am,reports no BM this week   NEUROLOGIC: PERRL, reports dizziness for the past few days   URINARY reports not urinating much

## 2020-10-01 ENCOUNTER — HOSPITAL ENCOUNTER (EMERGENCY)
Facility: HOSPITAL | Age: 60
Discharge: HOME | End: 2020-10-01
Attending: EMERGENCY MEDICINE
Payer: MEDICARE

## 2020-10-01 VITALS
RESPIRATION RATE: 20 BRPM | TEMPERATURE: 99 F | BODY MASS INDEX: 19.94 KG/M2 | OXYGEN SATURATION: 100 % | HEART RATE: 66 BPM | SYSTOLIC BLOOD PRESSURE: 195 MMHG | DIASTOLIC BLOOD PRESSURE: 88 MMHG | WEIGHT: 135 LBS

## 2020-10-01 DIAGNOSIS — R53.83 FATIGUE, UNSPECIFIED TYPE: Primary | ICD-10-CM

## 2020-10-01 DIAGNOSIS — R10.13 EPIGASTRIC PAIN: ICD-10-CM

## 2020-10-01 DIAGNOSIS — R03.0 ELEVATED BLOOD PRESSURE READING: ICD-10-CM

## 2020-10-01 DIAGNOSIS — K59.00 CONSTIPATION: ICD-10-CM

## 2020-10-01 LAB
ALBUMIN SERPL BCP-MCNC: 3.5 G/DL (ref 3.5–5.2)
ALP SERPL-CCNC: 67 U/L (ref 55–135)
ALT SERPL W/O P-5'-P-CCNC: 6 U/L (ref 10–44)
ANION GAP SERPL CALC-SCNC: 10 MMOL/L (ref 8–16)
AST SERPL-CCNC: 12 U/L (ref 10–40)
BASOPHILS # BLD AUTO: 0.02 K/UL (ref 0–0.2)
BASOPHILS NFR BLD: 0.3 % (ref 0–1.9)
BILIRUB SERPL-MCNC: 0.2 MG/DL (ref 0.1–1)
BUN SERPL-MCNC: 11 MG/DL (ref 8–23)
CALCIUM SERPL-MCNC: 9.1 MG/DL (ref 8.7–10.5)
CHLORIDE SERPL-SCNC: 101 MMOL/L (ref 95–110)
CK SERPL-CCNC: 121 U/L (ref 20–200)
CO2 SERPL-SCNC: 27 MMOL/L (ref 23–29)
CREAT SERPL-MCNC: 1.4 MG/DL (ref 0.5–1.4)
DIFFERENTIAL METHOD: ABNORMAL
EOSINOPHIL # BLD AUTO: 0.1 K/UL (ref 0–0.5)
EOSINOPHIL NFR BLD: 0.8 % (ref 0–8)
ERYTHROCYTE [DISTWIDTH] IN BLOOD BY AUTOMATED COUNT: 12.1 % (ref 11.5–14.5)
EST. GFR  (AFRICAN AMERICAN): >60 ML/MIN/1.73 M^2
EST. GFR  (NON AFRICAN AMERICAN): 53 ML/MIN/1.73 M^2
GLUCOSE SERPL-MCNC: 355 MG/DL (ref 70–110)
HCT VFR BLD AUTO: 38.3 % (ref 40–54)
HGB BLD-MCNC: 12.6 G/DL (ref 14–18)
IMM GRANULOCYTES # BLD AUTO: 0.02 K/UL (ref 0–0.04)
IMM GRANULOCYTES NFR BLD AUTO: 0.3 % (ref 0–0.5)
LIPASE SERPL-CCNC: 117 U/L (ref 4–60)
LYMPHOCYTES # BLD AUTO: 2.7 K/UL (ref 1–4.8)
LYMPHOCYTES NFR BLD: 42.2 % (ref 18–48)
MCH RBC QN AUTO: 29.4 PG (ref 27–31)
MCHC RBC AUTO-ENTMCNC: 32.9 G/DL (ref 32–36)
MCV RBC AUTO: 89 FL (ref 82–98)
MONOCYTES # BLD AUTO: 0.5 K/UL (ref 0.3–1)
MONOCYTES NFR BLD: 7.2 % (ref 4–15)
NEUTROPHILS # BLD AUTO: 3.1 K/UL (ref 1.8–7.7)
NEUTROPHILS NFR BLD: 49.2 % (ref 38–73)
NRBC BLD-RTO: 0 /100 WBC
PLATELET # BLD AUTO: 229 K/UL (ref 150–350)
PMV BLD AUTO: 11 FL (ref 9.2–12.9)
POCT GLUCOSE: 221 MG/DL (ref 70–110)
POTASSIUM SERPL-SCNC: 4.2 MMOL/L (ref 3.5–5.1)
PROT SERPL-MCNC: 6.9 G/DL (ref 6–8.4)
RBC # BLD AUTO: 4.29 M/UL (ref 4.6–6.2)
SARS-COV-2 RDRP RESP QL NAA+PROBE: NEGATIVE
SODIUM SERPL-SCNC: 138 MMOL/L (ref 136–145)
TROPONIN I SERPL DL<=0.01 NG/ML-MCNC: <0.006 NG/ML (ref 0–0.03)
WBC # BLD AUTO: 6.35 K/UL (ref 3.9–12.7)

## 2020-10-01 PROCEDURE — 25000003 PHARM REV CODE 250: Mod: HCWC | Performed by: NURSE PRACTITIONER

## 2020-10-01 PROCEDURE — 96360 HYDRATION IV INFUSION INIT: CPT | Mod: HCWC

## 2020-10-01 PROCEDURE — 82550 ASSAY OF CK (CPK): CPT | Mod: HCWC

## 2020-10-01 PROCEDURE — 85025 COMPLETE CBC W/AUTO DIFF WBC: CPT | Mod: HCWC

## 2020-10-01 PROCEDURE — 82962 GLUCOSE BLOOD TEST: CPT | Mod: HCWC,91

## 2020-10-01 PROCEDURE — 83690 ASSAY OF LIPASE: CPT | Mod: HCWC

## 2020-10-01 PROCEDURE — U0002 COVID-19 LAB TEST NON-CDC: HCPCS | Mod: HCWC

## 2020-10-01 PROCEDURE — 93005 ELECTROCARDIOGRAM TRACING: CPT | Mod: HCWC

## 2020-10-01 PROCEDURE — 80053 COMPREHEN METABOLIC PANEL: CPT | Mod: HCWC

## 2020-10-01 PROCEDURE — 99285 EMERGENCY DEPT VISIT HI MDM: CPT | Mod: 25,HCWC

## 2020-10-01 PROCEDURE — 84484 ASSAY OF TROPONIN QUANT: CPT | Mod: HCWC

## 2020-10-01 RX ADMIN — SODIUM CHLORIDE 1000 ML: 0.9 INJECTION, SOLUTION INTRAVENOUS at 06:10

## 2020-10-01 NOTE — ED NOTES
APPEARANCE: Alert, oriented and in no acute distress.  CARDIAC: Normal rate and rhythm, no murmur heard. + weakness with wt loss over last month.  PERIPHERAL VASCULAR: peripheral pulses present. Normal cap refill. No edema. Warm to touch.    RESPIRATORY:Normal rate and effort, breath sounds clear bilaterally throughout chest. Respirations are equal and unlabored no obvious signs of distress.  GASTRO: soft, bowel sounds normal, no tenderness, no abdominal distention.  MUSC: Full ROM. No bony tenderness or soft tissue tenderness. No obvious deformity.  SKIN: Skin is warm and dry, normal skin turgor, mucous membranes moist.  NEURO: 5/5 strength major flexors/extensors bilaterally. Sensory intact to light touch bilaterally. Nalini coma scale: eyes open spontaneously-4, oriented & converses-5, obeys commands-6. No neurological abnormalities.   MENTAL STATUS: awake, alert and aware of environment.  EYE: PERRL, both eyes: pupils brisk and reactive to light. Normal size.  ENT: EARS: no obvious drainage. NOSE: no active bleeding.

## 2020-10-01 NOTE — ED PROVIDER NOTES
"Encounter Date: 10/1/2020       History     Chief Complaint   Patient presents with    Fatigue     reports fatigue, vomiting, constipation (last bm yesteday), epigastric pain, bilateral leg pains for the past few weeks.      60-year-old male with past medical history of diabetes, hepatitis-C, hypertension, and hyper cholesterolemia presents to the ED for fatigue.  The patient states that for the past 3 days he has been especially fatigued.  He believes that he may be dehydrated as he has felt similar symptoms in the past at which time he was diagnosed with dehydration.  The patient states that he has been having "cramps" in his legs.  He vomited a few times last night but denies hematemesis.  He reports constipation but states that his last bowel movement was yesterday.  No rectal bleeding.  He reports chronic abdominal pain but no current abdominal pain.  The patient states that he is compliant with his home medications.  No trauma, fever/chills, chest pain, shortness of breath, palpitaions, or urinary symptoms.  No other complaints at this time.    The history is provided by the patient and medical records.     Review of patient's allergies indicates:  No Known Allergies  Past Medical History:   Diagnosis Date    Cervicalgia     Diabetes mellitus     History of hepatitis C, s/p successful Rx w/ cure (SVR12 - 9/2020)     Hypercholesteremia     Hypertension     Jaw pain     Lipoma of back     Lumbago     Premature beats, unspecified     Sciatica     Scoliosis (and kyphoscoliosis), idiopathic     Undiagnosed cardiac murmurs      Past Surgical History:   Procedure Laterality Date    COLONOSCOPY N/A 7/14/2020    Procedure: COLONOSCOPY;  Surgeon: Magdalena Sales MD;  Location: [facility];  Service: Endoscopy;  Laterality: N/A;     History reviewed. No pertinent family history.  Social History     Tobacco Use    Smoking status: Never Smoker    Smokeless tobacco: Never Used   Substance Use Topics    " Alcohol use: No    Drug use: Yes     Types: Marijuana     Comment: daily     Review of Systems   Constitutional: Negative for activity change, fatigue and fever.   HENT: Negative for facial swelling, nosebleeds and trouble swallowing.    Respiratory: Negative for chest tightness and shortness of breath.    Cardiovascular: Negative for chest pain.   Gastrointestinal: Negative for abdominal pain, diarrhea, nausea and vomiting.   Musculoskeletal: Negative for back pain, joint swelling, myalgias and neck pain.   Skin: Negative.    Neurological: Negative for weakness and headaches.   Psychiatric/Behavioral: Negative for confusion.   All other systems reviewed and are negative.      Physical Exam     Initial Vitals [10/01/20 1549]   BP Pulse Resp Temp SpO2   (!) 142/66 68 20 98.7 °F (37.1 °C) 99 %      MAP       --         Physical Exam    Nursing note and vitals reviewed.  Constitutional: He appears well-developed and well-nourished. He is active and cooperative. He is easily aroused.  Non-toxic appearance. He does not have a sickly appearance. He appears ill (Chronic). No distress.   HENT:   Head: Normocephalic and atraumatic.   Mouth/Throat: Mucous membranes are normal.   Eyes: Conjunctivae are normal. Scleral icterus is present.   Neck: Normal range of motion and phonation normal.   Cardiovascular: Normal rate and regular rhythm.   Murmur heard.  Pulmonary/Chest: Effort normal and breath sounds normal.   Abdominal: Soft. Normal appearance and bowel sounds are normal. He exhibits no distension. There is no abdominal tenderness. There is no rigidity, no rebound, no guarding and no CVA tenderness.   Musculoskeletal:      Comments: No lower extremity edema   Neurological: He is alert, oriented to person, place, and time and easily aroused. He has normal strength. Coordination normal. GCS eye subscore is 4. GCS verbal subscore is 5. GCS motor subscore is 6.   Skin: Skin is warm, dry and intact. No bruising and no rash  noted.   Psychiatric: He has a normal mood and affect. His speech is normal and behavior is normal. Judgment and thought content normal. Cognition and memory are normal.         ED Course   Procedures  Labs Reviewed   CBC W/ AUTO DIFFERENTIAL - Abnormal; Notable for the following components:       Result Value    RBC 4.29 (*)     Hemoglobin 12.6 (*)     Hematocrit 38.3 (*)     All other components within normal limits   COMPREHENSIVE METABOLIC PANEL - Abnormal; Notable for the following components:    Glucose 355 (*)     ALT 6 (*)     eGFR if non  53 (*)     All other components within normal limits   LIPASE - Abnormal; Notable for the following components:    Lipase 117 (*)     All other components within normal limits   POCT GLUCOSE - Abnormal; Notable for the following components:    POCT Glucose 221 (*)     All other components within normal limits   TROPONIN I   CK   SARS-COV-2 RNA AMPLIFICATION, QUAL   CK          Imaging Results          X-Ray Abdomen Flat And Erect (Final result)  Result time 10/01/20 18:36:41    Final result by Gerry Paulino MD (10/01/20 18:36:41)                 Impression:      1. Nonobstructive bowel gas pattern.      Electronically signed by: Gerry Paulino MD  Date:    10/01/2020  Time:    18:36             Narrative:    EXAMINATION:  XR ABDOMEN FLAT AND ERECT    CLINICAL HISTORY:  Constipation, unspecified    TECHNIQUE:  Flat and erect AP views of the abdomen were performed.    COMPARISON:  07/12/2016    FINDINGS:  One upright view, 1 supine view.    No significant air-fluid levels on upright view.  Air and stool is seen within the large bowel and projected over the rectum.  There is moderate stool in the colon.  There are no calcifications to convincingly suggest nephrolithiasis or cholelithiasis.  No large volume free air or pneumatosis.  The lower lung zones are grossly clear.  Degenerative changes are noted of the osseous structures.                                  Medical Decision Making:   Differential Diagnosis:   Dehydration, electrolyte derangement, FELI, deconditioning, arrhythmia, NSTEMI, rhabdo, infection, obstruction  Clinical Tests:   Lab Tests: Ordered and Reviewed  Radiological Study: Ordered and Reviewed  Medical Tests: Ordered and Reviewed  ED Management:  Labs, EKG, Xray, IV fluids.   WBC normal. H&H stable. Renal function at baseline. Glucose is 355 but pt is not in DKA.  He was given 1L NS bolus and states that he feels much better and is ready for discharge.  Lipase is elevated, but LFTs are otherwise normal. Pt denies current abd pain and his abd is soft and non-tender.  Xray negative for acute changes.  The patient was on continuous cardiac monitoring while in the ED without significant ectopy or event.  He denies chest pain or shortness of breath while in the ED. No headache.  The patient states that he is scheduled to take his home BP medication at 8pm. He has no associated signs or symptoms of hypertension.  I advised him to take his medication as prescribed when he gets home.    I encouraged increased water intake.  Pt to follow up with PCP within 2 days.  I reviewed strict return precautions. In addition, pt is to return to the ED if condition changes, progresses, or if there are any concerns.  Pt verbalized understanding, compliance, and agreement with the treatment plan.      The patient's blood pressure was noted to be elevated while in the ED today.  The patient has no associated signs or symptoms of hypertension.  Patient's blood pressure is likely elevated due to situation.  Advised blood pressure recheck by PCP within 1 week.                               Clinical Impression:       ICD-10-CM ICD-9-CM   1. Fatigue, unspecified type  R53.83 780.79   2. Epigastric pain  R10.13 789.06   3. Constipation  K59.00 564.00   4. Elevated blood pressure reading  R03.0 796.2                          ED Disposition Condition    Discharge Stable         ED Prescriptions     None        Follow-up Information     Follow up With Specialties Details Why Contact Info    Dave Donaldson NP Nephrology Schedule an appointment as soon as possible for a visit in 3 days  [address]  [address]  [facility] [address] [phone]                                         Alisson Loredo, LOU  10/01/20 5230

## 2020-10-01 NOTE — FIRST PROVIDER EVALUATION
Emergency Department TeleTriage Encounter Note      CHIEF COMPLAINT    Chief Complaint   Patient presents with    Fatigue     reports fatigue, vomiting, constipation (last bm yesteday), epigastric pain, bilateral leg pains for the past few weeks.        VITAL SIGNS   Initial Vitals [10/01/20 1549]   BP Pulse Resp Temp SpO2   (!) 142/66 68 20 98.7 °F (37.1 °C) 99 %      MAP       --            ALLERGIES    Review of patient's allergies indicates:  No Known Allergies    PROVIDER TRIAGE NOTE  This is a teletriage evaluation of a 60 y.o. male presenting to the ED with c/o generalized weakness, fatigue, vomiting, and epigastric pain. Also reports bilateral leg pain.  Reports that he is outside in the heat. Initial orders will be placed and care will be transferred to an alternate provider when patient is roomed for a full evaluation. Any additional orders and the final disposition will be determined by that provider.         ORDERS  Labs Reviewed - No data to display    ED Orders (720h ago, onward)    Start Ordered     Status Ordering Provider    10/01/20 1553 10/01/20 1552  Vital signs  Every 15 min      Ordered JOSIAH APONTE    10/01/20 1553 10/01/20 1552  Cardiac Monitoring - Adult  Continuous     Comments: Notify Physician If:    Ordered JOSIAH APONTE    10/01/20 1553 10/01/20 1552  Pulse Oximetry Continuous  Continuous      Ordered JOSIAH APONTE    10/01/20 1553 10/01/20 1552  Diet NPO  Diet effective now      Ordered JOSIAH APONTE    10/01/20 1553 10/01/20 1552  Saline lock IV  Once      Ordered JOSIAH APONTE    10/01/20 1553 10/01/20 1552  EKG 12-lead  Once     Comments: Do not perform if previously done during this visit/ triage    Ordered JOSIAH APONTE    10/01/20 1553 10/01/20 1552  CBC auto differential  STAT      Ordered JOSIAH APONTE    10/01/20 1553 10/01/20 1552  Comprehensive metabolic panel  STAT      Ordered JOSIAH APONTE    10/01/20 1553 10/01/20 1552  Troponin I #1  STAT       Ordered JOSIAH APONTE P.    10/01/20 1553 10/01/20 1552  Lipase  STAT      Ordered JOSIAH APONTE P.    10/01/20 1553 10/01/20 1552  CPK  STAT      Ordered JOSIAH APONTE            Virtual Visit Note: The provider triage portion of this emergency department evaluation and documentation was performed via [employer], a HIPAA-compliant telemedicine application, in concert with a tele-presenter in the room. A face to face patient evaluation with one of my colleagues will occur once the patient is placed in an emergency department room.      DISCLAIMER: This note was prepared with [employer] voice recognition transcription software. Garbled syntax, mangled pronouns, and other bizarre constructions may be attributed to that software system.

## 2020-10-02 LAB — POCT GLUCOSE: 319 MG/DL (ref 70–110)

## 2020-10-02 NOTE — ED NOTES
Assumed care of patient from WILIAN Chávez. Pt resting on the stretcher in NAD at this time stating that he feels better. Pt aware of POC. ARELI Vinson at bedside at approximate time.

## 2020-10-02 NOTE — ED NOTES
"Pt instructed on the importance of following up with his PCP in regards to his high BP. Pt verbalized understanding. Pt currently denies symptoms associated with high BP including dizziness, headache, neck pain, or blurred vision. Pt states "I feel fine right now"   "

## 2020-10-02 NOTE — DISCHARGE INSTRUCTIONS
Take your blood pressure medication as prescribed when you get home.     Your blood pressure was a little high today.  You need to have it rechecked by your doctor within one week.    Return to the ED if your condition changes, progresses, or if you have any concerns.

## 2020-12-09 NOTE — TELEPHONE ENCOUNTER
----- Message from Yoli Hearn sent at 10/1/2019  5:41 PM CDT -----    We have the pt records and they are now pending review by the referral nurse.  By:Yoli Hearn           Medications that are requested Atorvastatin 40mg    Medications selected in [address] [place] [address]    Patient pharmacy [facility] [phone]     Patient pharmacy entered in [address] [place] [address]    Patient notified that refill may take 48-72 hoursYes    Callback Number: [phone]    Best Availability: Any    Can A Detailed Message Be Left? Yes     Additional Info: No    Informed patient to check with their pharmacy for the status of the refill and they will only be contacted if there is an issue with the refillYes

## 2021-02-01 ENCOUNTER — TELEPHONE (OUTPATIENT)
Dept: ORTHOPEDICS | Facility: CLINIC | Age: 61
End: 2021-02-01

## 2021-02-01 ENCOUNTER — OFFICE VISIT (OUTPATIENT)
Dept: ORTHOPEDICS | Facility: CLINIC | Age: 61
End: 2021-02-01
Payer: MEDICARE

## 2021-02-01 ENCOUNTER — HOSPITAL ENCOUNTER (OUTPATIENT)
Dept: RADIOLOGY | Facility: HOSPITAL | Age: 61
Discharge: HOME | End: 2021-02-01
Attending: PHYSICIAN ASSISTANT
Payer: MEDICARE

## 2021-02-01 VITALS
SYSTOLIC BLOOD PRESSURE: 191 MMHG | HEIGHT: 69 IN | HEART RATE: 67 BPM | DIASTOLIC BLOOD PRESSURE: 85 MMHG | BODY MASS INDEX: 19.99 KG/M2 | WEIGHT: 134.94 LBS

## 2021-02-01 DIAGNOSIS — M17.0 BILATERAL PRIMARY OSTEOARTHRITIS OF KNEE: Primary | ICD-10-CM

## 2021-02-01 DIAGNOSIS — M25.551 HIP PAIN, ACUTE, RIGHT: ICD-10-CM

## 2021-02-01 DIAGNOSIS — M25.569 KNEE PAIN, UNSPECIFIED CHRONICITY, UNSPECIFIED LATERALITY: ICD-10-CM

## 2021-02-01 DIAGNOSIS — M25.551 HIP PAIN, ACUTE, RIGHT: Primary | ICD-10-CM

## 2021-02-01 DIAGNOSIS — M25.569 KNEE PAIN, UNSPECIFIED CHRONICITY, UNSPECIFIED LATERALITY: Primary | ICD-10-CM

## 2021-02-01 PROCEDURE — 3079F DIAST BP 80-89 MM HG: CPT | Mod: HCWC,CPTII,S$GLB, | Performed by: PHYSICIAN ASSISTANT

## 2021-02-01 PROCEDURE — 3008F BODY MASS INDEX DOCD: CPT | Mod: HCWC,CPTII,S$GLB, | Performed by: PHYSICIAN ASSISTANT

## 2021-02-01 PROCEDURE — 3008F PR BODY MASS INDEX (BMI) DOCUMENTED: ICD-10-PCS | Mod: HCWC,CPTII,S$GLB, | Performed by: PHYSICIAN ASSISTANT

## 2021-02-01 PROCEDURE — 73521 XR HIPS BILATERAL 2 VIEW INCL AP PELVIS: ICD-10-PCS | Mod: 26,HCWC,, | Performed by: RADIOLOGY

## 2021-02-01 PROCEDURE — 73564 X-RAY EXAM KNEE 4 OR MORE: CPT | Mod: 26,HCWC,RT, | Performed by: RADIOLOGY

## 2021-02-01 PROCEDURE — 1125F PR PAIN SEVERITY QUANTIFIED, PAIN PRESENT: ICD-10-PCS | Mod: HCWC,S$GLB,, | Performed by: PHYSICIAN ASSISTANT

## 2021-02-01 PROCEDURE — 3079F PR MOST RECENT DIASTOLIC BLOOD PRESSURE 80-89 MM HG: ICD-10-PCS | Mod: HCWC,CPTII,S$GLB, | Performed by: PHYSICIAN ASSISTANT

## 2021-02-01 PROCEDURE — 73521 X-RAY EXAM HIPS BI 2 VIEWS: CPT | Mod: TC,HCWC,PN

## 2021-02-01 PROCEDURE — 73564 X-RAY EXAM KNEE 4 OR MORE: CPT | Mod: TC,50,HCWC,PN

## 2021-02-01 PROCEDURE — 1125F AMNT PAIN NOTED PAIN PRSNT: CPT | Mod: HCWC,S$GLB,, | Performed by: PHYSICIAN ASSISTANT

## 2021-02-01 PROCEDURE — 99999 PR PBB SHADOW E&M-EST. PATIENT-LVL III: ICD-10-PCS | Mod: PBBFAC,HCWC,, | Performed by: PHYSICIAN ASSISTANT

## 2021-02-01 PROCEDURE — 3077F PR MOST RECENT SYSTOLIC BLOOD PRESSURE >= 140 MM HG: ICD-10-PCS | Mod: HCWC,CPTII,S$GLB, | Performed by: PHYSICIAN ASSISTANT

## 2021-02-01 PROCEDURE — 73564 X-RAY EXAM KNEE 4 OR MORE: CPT | Mod: 26,59,HCWC,LT | Performed by: RADIOLOGY

## 2021-02-01 PROCEDURE — 3077F SYST BP >= 140 MM HG: CPT | Mod: HCWC,CPTII,S$GLB, | Performed by: PHYSICIAN ASSISTANT

## 2021-02-01 PROCEDURE — 73521 X-RAY EXAM HIPS BI 2 VIEWS: CPT | Mod: 26,HCWC,, | Performed by: RADIOLOGY

## 2021-02-01 PROCEDURE — 99999 PR PBB SHADOW E&M-EST. PATIENT-LVL III: CPT | Mod: PBBFAC,HCWC,, | Performed by: PHYSICIAN ASSISTANT

## 2021-02-01 PROCEDURE — 99202 OFFICE O/P NEW SF 15 MIN: CPT | Mod: HCWC,S$GLB,, | Performed by: PHYSICIAN ASSISTANT

## 2021-02-01 PROCEDURE — 99202 PR OFFICE/OUTPT VISIT, NEW, LEVL II, 15-29 MIN: ICD-10-PCS | Mod: HCWC,S$GLB,, | Performed by: PHYSICIAN ASSISTANT

## 2021-02-01 PROCEDURE — 73564 XR KNEE ORTHO BILAT WITH FLEXION: ICD-10-PCS | Mod: 26,HCWC,RT, | Performed by: RADIOLOGY

## 2021-02-01 RX ORDER — LOVASTATIN 10 MG/1
TABLET ORAL
COMMUNITY
Start: 2021-01-26

## 2021-02-01 RX ORDER — MECLIZINE HYDROCHLORIDE 25 MG/1
TABLET ORAL
COMMUNITY

## 2021-02-01 RX ORDER — MELOXICAM 7.5 MG/1
7.5 TABLET ORAL DAILY
Qty: 30 TABLET | Refills: 2 | Status: ON HOLD | OUTPATIENT
Start: 2021-02-01 | End: 2021-06-01

## 2021-02-25 NOTE — PLAN OF CARE
Discharge orders noted, no HH or HME ordered.    August 22, 2019 Follow up with Dr Dave Donaldson   Thursday Aug 22, 2019   @ 4:00 pm  [facility]   [address] [place] [address]   Phone: [phone]      Pt's nurse will go over medications/signs and symptoms prior to discharge       08/09/19 1520   Final Note   Assessment Type Final Discharge Note   Anticipated Discharge Disposition Home   What phone number can be called within the next 1-3 days to see how you are doing after discharge? [phone]   Hospital Follow Up  Appt(s) scheduled? Yes   Right Care Referral Info   Post Acute Recommendation No Care     Mary Angela, RN Transitional Navigator  [phone]     good balance

## 2021-03-02 ENCOUNTER — TELEPHONE (OUTPATIENT)
Dept: CARDIOLOGY | Facility: CLINIC | Age: 61
End: 2021-03-02

## 2021-04-16 DIAGNOSIS — I73.9 PERIPHERAL ARTERIAL DISEASE: Primary | ICD-10-CM

## 2021-04-27 ENCOUNTER — OFFICE VISIT (OUTPATIENT)
Dept: ORTHOPEDICS | Facility: CLINIC | Age: 61
End: 2021-04-27
Payer: MEDICARE

## 2021-04-27 VITALS
WEIGHT: 134 LBS | HEIGHT: 69 IN | SYSTOLIC BLOOD PRESSURE: 137 MMHG | HEART RATE: 70 BPM | BODY MASS INDEX: 19.85 KG/M2 | DIASTOLIC BLOOD PRESSURE: 78 MMHG

## 2021-04-27 DIAGNOSIS — M16.11 PRIMARY OSTEOARTHRITIS OF RIGHT HIP: ICD-10-CM

## 2021-04-27 DIAGNOSIS — M17.0 BILATERAL PRIMARY OSTEOARTHRITIS OF KNEE: Primary | ICD-10-CM

## 2021-04-27 PROCEDURE — 99999 PR PBB SHADOW E&M-EST. PATIENT-LVL III: ICD-10-PCS | Mod: PBBFAC,,, | Performed by: PHYSICIAN ASSISTANT

## 2021-04-27 PROCEDURE — 3008F PR BODY MASS INDEX (BMI) DOCUMENTED: ICD-10-PCS | Mod: CPTII,S$GLB,, | Performed by: PHYSICIAN ASSISTANT

## 2021-04-27 PROCEDURE — 1125F PR PAIN SEVERITY QUANTIFIED, PAIN PRESENT: ICD-10-PCS | Mod: S$GLB,,, | Performed by: PHYSICIAN ASSISTANT

## 2021-04-27 PROCEDURE — 3008F BODY MASS INDEX DOCD: CPT | Mod: CPTII,S$GLB,, | Performed by: PHYSICIAN ASSISTANT

## 2021-04-27 PROCEDURE — 99213 PR OFFICE/OUTPT VISIT, EST, LEVL III, 20-29 MIN: ICD-10-PCS | Mod: S$GLB,,, | Performed by: PHYSICIAN ASSISTANT

## 2021-04-27 PROCEDURE — 1125F AMNT PAIN NOTED PAIN PRSNT: CPT | Mod: S$GLB,,, | Performed by: PHYSICIAN ASSISTANT

## 2021-04-27 PROCEDURE — 99999 PR PBB SHADOW E&M-EST. PATIENT-LVL III: CPT | Mod: PBBFAC,,, | Performed by: PHYSICIAN ASSISTANT

## 2021-04-27 PROCEDURE — 99213 OFFICE O/P EST LOW 20 MIN: CPT | Mod: S$GLB,,, | Performed by: PHYSICIAN ASSISTANT

## 2021-04-27 RX ORDER — MELOXICAM 15 MG/1
15 TABLET ORAL DAILY
Qty: 30 TABLET | Refills: 2 | Status: SHIPPED | OUTPATIENT
Start: 2021-04-27

## 2021-05-14 ENCOUNTER — OFFICE VISIT (OUTPATIENT)
Dept: CARDIOLOGY | Facility: CLINIC | Age: 61
End: 2021-05-14
Payer: MEDICARE

## 2021-05-14 ENCOUNTER — HOSPITAL ENCOUNTER (OUTPATIENT)
Dept: CARDIOLOGY | Facility: HOSPITAL | Age: 61
Discharge: HOME | End: 2021-05-14
Attending: INTERNAL MEDICINE
Payer: MEDICARE

## 2021-05-14 VITALS
BODY MASS INDEX: 21.74 KG/M2 | OXYGEN SATURATION: 100 % | HEART RATE: 67 BPM | WEIGHT: 146.81 LBS | HEIGHT: 69 IN | DIASTOLIC BLOOD PRESSURE: 69 MMHG | SYSTOLIC BLOOD PRESSURE: 160 MMHG

## 2021-05-14 DIAGNOSIS — Z79.4 TYPE 2 DIABETES MELLITUS WITH DIABETIC PERIPHERAL ANGIOPATHY WITHOUT GANGRENE, WITH LONG-TERM CURRENT USE OF INSULIN: ICD-10-CM

## 2021-05-14 DIAGNOSIS — I73.9 PERIPHERAL ARTERIAL DISEASE: ICD-10-CM

## 2021-05-14 DIAGNOSIS — I10 ESSENTIAL HYPERTENSION: ICD-10-CM

## 2021-05-14 DIAGNOSIS — E78.49 OTHER HYPERLIPIDEMIA: ICD-10-CM

## 2021-05-14 DIAGNOSIS — I73.9 PERIPHERAL ARTERIAL DISEASE: Primary | ICD-10-CM

## 2021-05-14 DIAGNOSIS — E11.51 TYPE 2 DIABETES MELLITUS WITH DIABETIC PERIPHERAL ANGIOPATHY WITHOUT GANGRENE, WITH LONG-TERM CURRENT USE OF INSULIN: ICD-10-CM

## 2021-05-14 LAB
LEFT ABI: 0.93
LEFT ARM BP: 176 MMHG
LEFT DORSALIS PEDIS: 152 MMHG
LEFT POSTERIOR TIBIAL: 164 MMHG
RIGHT ABI: 1.02
RIGHT ARM BP: 165 MMHG
RIGHT DORSALIS PEDIS: 180 MMHG
RIGHT POSTERIOR TIBIAL: 172 MMHG

## 2021-05-14 PROCEDURE — 99999 PR PBB SHADOW E&M-EST. PATIENT-LVL III: ICD-10-PCS | Mod: PBBFAC,,, | Performed by: INTERNAL MEDICINE

## 2021-05-14 PROCEDURE — 93922 UPR/L XTREMITY ART 2 LEVELS: CPT | Mod: 26,,, | Performed by: INTERNAL MEDICINE

## 2021-05-14 PROCEDURE — 93922 ANKLE BRACHIAL INDICES (ABI): ICD-10-PCS | Mod: 26,,, | Performed by: INTERNAL MEDICINE

## 2021-05-14 PROCEDURE — 99999 PR PBB SHADOW E&M-EST. PATIENT-LVL III: CPT | Mod: PBBFAC,,, | Performed by: INTERNAL MEDICINE

## 2021-05-14 PROCEDURE — 93922 UPR/L XTREMITY ART 2 LEVELS: CPT

## 2021-05-14 PROCEDURE — 99214 PR OFFICE/OUTPT VISIT, EST, LEVL IV, 30-39 MIN: ICD-10-PCS | Mod: GC,S$GLB,, | Performed by: INTERNAL MEDICINE

## 2021-05-14 PROCEDURE — 99214 OFFICE O/P EST MOD 30 MIN: CPT | Mod: GC,S$GLB,, | Performed by: INTERNAL MEDICINE

## 2021-06-01 ENCOUNTER — HOSPITAL ENCOUNTER (OUTPATIENT)
Facility: HOSPITAL | Age: 61
Discharge: HOME | End: 2021-06-02
Attending: EMERGENCY MEDICINE | Admitting: FAMILY MEDICINE
Payer: MEDICARE

## 2021-06-01 DIAGNOSIS — N17.9 AKI (ACUTE KIDNEY INJURY): Primary | ICD-10-CM

## 2021-06-01 DIAGNOSIS — R42 DIZZINESS: ICD-10-CM

## 2021-06-01 PROBLEM — I10 BENIGN ESSENTIAL HYPERTENSION: Status: ACTIVE | Noted: 2021-06-01

## 2021-06-01 PROBLEM — E78.5 HYPERLIPIDEMIA: Status: ACTIVE | Noted: 2021-06-01

## 2021-06-01 PROBLEM — R10.9 ABDOMINAL PAIN: Status: ACTIVE | Noted: 2021-06-01

## 2021-06-01 PROBLEM — E11.9 DIABETES MELLITUS: Status: ACTIVE | Noted: 2021-06-01

## 2021-06-01 PROBLEM — B86 INFESTATION BY SARCOPTES SCABIEI VAR HOMINIS: Status: ACTIVE | Noted: 2021-06-01

## 2021-06-01 LAB
ALBUMIN SERPL BCP-MCNC: 3.6 G/DL (ref 3.5–5.2)
ALP SERPL-CCNC: 57 U/L (ref 55–135)
ALT SERPL W/O P-5'-P-CCNC: 8 U/L (ref 10–44)
ANION GAP SERPL CALC-SCNC: 11 MMOL/L (ref 8–16)
AST SERPL-CCNC: 15 U/L (ref 10–40)
BACTERIA #/AREA URNS HPF: ABNORMAL /HPF
BASOPHILS # BLD AUTO: 0.01 K/UL (ref 0–0.2)
BASOPHILS NFR BLD: 0.2 % (ref 0–1.9)
BILIRUB SERPL-MCNC: 0.2 MG/DL (ref 0.1–1)
BILIRUB UR QL STRIP: NEGATIVE
BUN SERPL-MCNC: 40 MG/DL (ref 8–23)
CALCIUM SERPL-MCNC: 8.2 MG/DL (ref 8.7–10.5)
CHLORIDE SERPL-SCNC: 101 MMOL/L (ref 95–110)
CK SERPL-CCNC: 175 U/L (ref 20–200)
CLARITY UR: ABNORMAL
CO2 SERPL-SCNC: 22 MMOL/L (ref 23–29)
COLOR UR: YELLOW
CREAT SERPL-MCNC: 2.4 MG/DL (ref 0.5–1.4)
CTP QC/QA: YES
DIFFERENTIAL METHOD: ABNORMAL
EOSINOPHIL # BLD AUTO: 0.1 K/UL (ref 0–0.5)
EOSINOPHIL NFR BLD: 1.1 % (ref 0–8)
ERYTHROCYTE [DISTWIDTH] IN BLOOD BY AUTOMATED COUNT: 12.5 % (ref 11.5–14.5)
EST. GFR  (AFRICAN AMERICAN): 32 ML/MIN/1.73 M^2
EST. GFR  (NON AFRICAN AMERICAN): 28 ML/MIN/1.73 M^2
ESTIMATED AVG GLUCOSE: 220 MG/DL (ref 68–131)
GLUCOSE SERPL-MCNC: 306 MG/DL (ref 70–110)
GLUCOSE UR QL STRIP: ABNORMAL
HBA1C MFR BLD: 9.3 % (ref 4–5.6)
HCT VFR BLD AUTO: 41.1 % (ref 40–54)
HGB BLD-MCNC: 14.3 G/DL (ref 14–18)
HGB UR QL STRIP: NEGATIVE
HYALINE CASTS #/AREA URNS LPF: 15 /LPF
IMM GRANULOCYTES # BLD AUTO: 0.01 K/UL (ref 0–0.04)
IMM GRANULOCYTES NFR BLD AUTO: 0.2 % (ref 0–0.5)
KETONES UR QL STRIP: ABNORMAL
LEUKOCYTE ESTERASE UR QL STRIP: ABNORMAL
LYMPHOCYTES # BLD AUTO: 2.7 K/UL (ref 1–4.8)
LYMPHOCYTES NFR BLD: 50.2 % (ref 18–48)
MAGNESIUM SERPL-MCNC: 1.4 MG/DL (ref 1.6–2.6)
MCH RBC QN AUTO: 30 PG (ref 27–31)
MCHC RBC AUTO-ENTMCNC: 34.8 G/DL (ref 32–36)
MCV RBC AUTO: 86 FL (ref 82–98)
MICROSCOPIC COMMENT: ABNORMAL
MONOCYTES # BLD AUTO: 0.4 K/UL (ref 0.3–1)
MONOCYTES NFR BLD: 7.6 % (ref 4–15)
NEUTROPHILS # BLD AUTO: 2.2 K/UL (ref 1.8–7.7)
NEUTROPHILS NFR BLD: 40.7 % (ref 38–73)
NITRITE UR QL STRIP: NEGATIVE
NRBC BLD-RTO: 0 /100 WBC
PH UR STRIP: 5 [PH] (ref 5–8)
PHOSPHATE SERPL-MCNC: 3.1 MG/DL (ref 2.7–4.5)
PLATELET # BLD AUTO: 191 K/UL (ref 150–450)
PMV BLD AUTO: 11.7 FL (ref 9.2–12.9)
POCT GLUCOSE: 201 MG/DL (ref 70–110)
POCT GLUCOSE: 281 MG/DL (ref 70–110)
POTASSIUM SERPL-SCNC: 4.1 MMOL/L (ref 3.5–5.1)
PROT SERPL-MCNC: 6.4 G/DL (ref 6–8.4)
PROT UR QL STRIP: ABNORMAL
RBC # BLD AUTO: 4.77 M/UL (ref 4.6–6.2)
RBC #/AREA URNS HPF: 1 /HPF (ref 0–4)
SARS-COV-2 RDRP RESP QL NAA+PROBE: NEGATIVE
SODIUM SERPL-SCNC: 134 MMOL/L (ref 136–145)
SP GR UR STRIP: 1.02 (ref 1–1.03)
SQUAMOUS #/AREA URNS HPF: 10 /HPF
TROPONIN I SERPL DL<=0.01 NG/ML-MCNC: 0.01 NG/ML (ref 0–0.03)
TSH SERPL DL<=0.005 MIU/L-ACNC: 0.65 UIU/ML (ref 0.4–4)
URN SPEC COLLECT METH UR: ABNORMAL
UROBILINOGEN UR STRIP-ACNC: ABNORMAL EU/DL
WBC # BLD AUTO: 5.4 K/UL (ref 3.9–12.7)
WBC #/AREA URNS HPF: 2 /HPF (ref 0–5)

## 2021-06-01 PROCEDURE — 96361 HYDRATE IV INFUSION ADD-ON: CPT

## 2021-06-01 PROCEDURE — 85025 COMPLETE CBC W/AUTO DIFF WBC: CPT | Performed by: PHYSICIAN ASSISTANT

## 2021-06-01 PROCEDURE — 99285 EMERGENCY DEPT VISIT HI MDM: CPT | Mod: 25

## 2021-06-01 PROCEDURE — 25000003 PHARM REV CODE 250: Performed by: STUDENT IN AN ORGANIZED HEALTH CARE EDUCATION/TRAINING PROGRAM

## 2021-06-01 PROCEDURE — 81000 URINALYSIS NONAUTO W/SCOPE: CPT | Performed by: PHYSICIAN ASSISTANT

## 2021-06-01 PROCEDURE — U0002 COVID-19 LAB TEST NON-CDC: HCPCS | Performed by: PHYSICIAN ASSISTANT

## 2021-06-01 PROCEDURE — 84484 ASSAY OF TROPONIN QUANT: CPT | Performed by: PHYSICIAN ASSISTANT

## 2021-06-01 PROCEDURE — 93010 EKG 12-LEAD: ICD-10-PCS | Mod: ,,, | Performed by: INTERNAL MEDICINE

## 2021-06-01 PROCEDURE — 63600175 PHARM REV CODE 636 W HCPCS: Performed by: STUDENT IN AN ORGANIZED HEALTH CARE EDUCATION/TRAINING PROGRAM

## 2021-06-01 PROCEDURE — 93010 ELECTROCARDIOGRAM REPORT: CPT | Mod: ,,, | Performed by: INTERNAL MEDICINE

## 2021-06-01 PROCEDURE — 83036 HEMOGLOBIN GLYCOSYLATED A1C: CPT | Performed by: STUDENT IN AN ORGANIZED HEALTH CARE EDUCATION/TRAINING PROGRAM

## 2021-06-01 PROCEDURE — 82550 ASSAY OF CK (CPK): CPT | Performed by: PHYSICIAN ASSISTANT

## 2021-06-01 PROCEDURE — 36415 COLL VENOUS BLD VENIPUNCTURE: CPT | Performed by: STUDENT IN AN ORGANIZED HEALTH CARE EDUCATION/TRAINING PROGRAM

## 2021-06-01 PROCEDURE — 82962 GLUCOSE BLOOD TEST: CPT

## 2021-06-01 PROCEDURE — 84443 ASSAY THYROID STIM HORMONE: CPT | Performed by: STUDENT IN AN ORGANIZED HEALTH CARE EDUCATION/TRAINING PROGRAM

## 2021-06-01 PROCEDURE — 96372 THER/PROPH/DIAG INJ SC/IM: CPT | Mod: 59

## 2021-06-01 PROCEDURE — 93005 ELECTROCARDIOGRAM TRACING: CPT

## 2021-06-01 PROCEDURE — 63600175 PHARM REV CODE 636 W HCPCS: Performed by: PHYSICIAN ASSISTANT

## 2021-06-01 PROCEDURE — 84100 ASSAY OF PHOSPHORUS: CPT | Performed by: STUDENT IN AN ORGANIZED HEALTH CARE EDUCATION/TRAINING PROGRAM

## 2021-06-01 PROCEDURE — 96360 HYDRATION IV INFUSION INIT: CPT

## 2021-06-01 PROCEDURE — G0378 HOSPITAL OBSERVATION PER HR: HCPCS

## 2021-06-01 PROCEDURE — C9399 UNCLASSIFIED DRUGS OR BIOLOG: HCPCS | Performed by: STUDENT IN AN ORGANIZED HEALTH CARE EDUCATION/TRAINING PROGRAM

## 2021-06-01 PROCEDURE — 83735 ASSAY OF MAGNESIUM: CPT | Performed by: STUDENT IN AN ORGANIZED HEALTH CARE EDUCATION/TRAINING PROGRAM

## 2021-06-01 PROCEDURE — 80053 COMPREHEN METABOLIC PANEL: CPT | Performed by: PHYSICIAN ASSISTANT

## 2021-06-01 RX ORDER — SODIUM CHLORIDE, SODIUM LACTATE, POTASSIUM CHLORIDE, CALCIUM CHLORIDE 600; 310; 30; 20 MG/100ML; MG/100ML; MG/100ML; MG/100ML
INJECTION, SOLUTION INTRAVENOUS CONTINUOUS
Status: ACTIVE | OUTPATIENT
Start: 2021-06-01 | End: 2021-06-02

## 2021-06-01 RX ORDER — ACETAMINOPHEN 325 MG/1
650 TABLET ORAL EVERY 4 HOURS PRN
Status: DISCONTINUED | OUTPATIENT
Start: 2021-06-01 | End: 2021-06-02 | Stop reason: HOSPADM

## 2021-06-01 RX ORDER — ACETAMINOPHEN 325 MG/1
650 TABLET ORAL EVERY 8 HOURS PRN
Status: DISCONTINUED | OUTPATIENT
Start: 2021-06-01 | End: 2021-06-02 | Stop reason: HOSPADM

## 2021-06-01 RX ORDER — ENOXAPARIN SODIUM 100 MG/ML
40 INJECTION SUBCUTANEOUS EVERY 24 HOURS
Status: DISCONTINUED | OUTPATIENT
Start: 2021-06-01 | End: 2021-06-01

## 2021-06-01 RX ORDER — PRAVASTATIN SODIUM 10 MG/1
10 TABLET ORAL NIGHTLY
Status: DISCONTINUED | OUTPATIENT
Start: 2021-06-01 | End: 2021-06-02 | Stop reason: HOSPADM

## 2021-06-01 RX ORDER — ENOXAPARIN SODIUM 100 MG/ML
30 INJECTION SUBCUTANEOUS EVERY 24 HOURS
Status: DISCONTINUED | OUTPATIENT
Start: 2021-06-01 | End: 2021-06-02 | Stop reason: HOSPADM

## 2021-06-01 RX ORDER — TALC
9 POWDER (GRAM) TOPICAL NIGHTLY PRN
Status: DISCONTINUED | OUTPATIENT
Start: 2021-06-01 | End: 2021-06-02 | Stop reason: HOSPADM

## 2021-06-01 RX ORDER — IBUPROFEN 200 MG
16 TABLET ORAL
Status: DISCONTINUED | OUTPATIENT
Start: 2021-06-01 | End: 2021-06-02 | Stop reason: HOSPADM

## 2021-06-01 RX ORDER — INSULIN ASPART 100 [IU]/ML
1-10 INJECTION, SOLUTION INTRAVENOUS; SUBCUTANEOUS
Status: DISCONTINUED | OUTPATIENT
Start: 2021-06-01 | End: 2021-06-02 | Stop reason: HOSPADM

## 2021-06-01 RX ORDER — IBUPROFEN 200 MG
24 TABLET ORAL
Status: DISCONTINUED | OUTPATIENT
Start: 2021-06-01 | End: 2021-06-02 | Stop reason: HOSPADM

## 2021-06-01 RX ORDER — SODIUM CHLORIDE 0.9 % (FLUSH) 0.9 %
5 SYRINGE (ML) INJECTION
Status: DISCONTINUED | OUTPATIENT
Start: 2021-06-01 | End: 2021-06-02 | Stop reason: HOSPADM

## 2021-06-01 RX ORDER — ONDANSETRON 8 MG/1
8 TABLET, ORALLY DISINTEGRATING ORAL EVERY 6 HOURS PRN
Status: DISCONTINUED | OUTPATIENT
Start: 2021-06-01 | End: 2021-06-02 | Stop reason: HOSPADM

## 2021-06-01 RX ORDER — HYDRALAZINE HYDROCHLORIDE 20 MG/ML
10 INJECTION INTRAMUSCULAR; INTRAVENOUS EVERY 8 HOURS PRN
Status: DISCONTINUED | OUTPATIENT
Start: 2021-06-01 | End: 2021-06-02 | Stop reason: HOSPADM

## 2021-06-01 RX ORDER — GLUCAGON 1 MG
1 KIT INJECTION
Status: DISCONTINUED | OUTPATIENT
Start: 2021-06-01 | End: 2021-06-02 | Stop reason: HOSPADM

## 2021-06-01 RX ADMIN — SODIUM CHLORIDE, SODIUM LACTATE, POTASSIUM CHLORIDE, AND CALCIUM CHLORIDE: .6; .31; .03; .02 INJECTION, SOLUTION INTRAVENOUS at 07:06

## 2021-06-01 RX ADMIN — ENOXAPARIN SODIUM 30 MG: 30 INJECTION SUBCUTANEOUS at 07:06

## 2021-06-01 RX ADMIN — INSULIN ASPART 2 UNITS: 100 INJECTION, SOLUTION INTRAVENOUS; SUBCUTANEOUS at 09:06

## 2021-06-01 RX ADMIN — PRAVASTATIN SODIUM 10 MG: 10 TABLET ORAL at 09:06

## 2021-06-01 RX ADMIN — INSULIN DETEMIR 5 UNITS: 100 INJECTION, SOLUTION SUBCUTANEOUS at 09:06

## 2021-06-01 RX ADMIN — SODIUM CHLORIDE, SODIUM LACTATE, POTASSIUM CHLORIDE, AND CALCIUM CHLORIDE 1000 ML: .6; .31; .03; .02 INJECTION, SOLUTION INTRAVENOUS at 02:06

## 2021-06-01 RX ADMIN — SODIUM CHLORIDE, SODIUM LACTATE, POTASSIUM CHLORIDE, AND CALCIUM CHLORIDE 1000 ML: .6; .31; .03; .02 INJECTION, SOLUTION INTRAVENOUS at 04:06

## 2021-06-02 VITALS
WEIGHT: 146.63 LBS | RESPIRATION RATE: 18 BRPM | OXYGEN SATURATION: 98 % | HEART RATE: 60 BPM | HEIGHT: 69 IN | BODY MASS INDEX: 21.72 KG/M2 | SYSTOLIC BLOOD PRESSURE: 169 MMHG | DIASTOLIC BLOOD PRESSURE: 78 MMHG | TEMPERATURE: 99 F

## 2021-06-02 PROBLEM — N17.9 AKI (ACUTE KIDNEY INJURY): Status: RESOLVED | Noted: 2019-08-09 | Resolved: 2021-06-02

## 2021-06-02 LAB
ALBUMIN SERPL BCP-MCNC: 3.2 G/DL (ref 3.5–5.2)
ALP SERPL-CCNC: 53 U/L (ref 55–135)
ALT SERPL W/O P-5'-P-CCNC: 7 U/L (ref 10–44)
ANION GAP SERPL CALC-SCNC: 8 MMOL/L (ref 8–16)
AST SERPL-CCNC: 11 U/L (ref 10–40)
BASOPHILS # BLD AUTO: 0.04 K/UL (ref 0–0.2)
BASOPHILS NFR BLD: 0.8 % (ref 0–1.9)
BILIRUB SERPL-MCNC: 0.4 MG/DL (ref 0.1–1)
BUN SERPL-MCNC: 23 MG/DL (ref 8–23)
CALCIUM SERPL-MCNC: 8.1 MG/DL (ref 8.7–10.5)
CHLORIDE SERPL-SCNC: 105 MMOL/L (ref 95–110)
CO2 SERPL-SCNC: 24 MMOL/L (ref 23–29)
CREAT SERPL-MCNC: 1.2 MG/DL (ref 0.5–1.4)
DIFFERENTIAL METHOD: ABNORMAL
EOSINOPHIL # BLD AUTO: 0.1 K/UL (ref 0–0.5)
EOSINOPHIL NFR BLD: 2.7 % (ref 0–8)
ERYTHROCYTE [DISTWIDTH] IN BLOOD BY AUTOMATED COUNT: 12.3 % (ref 11.5–14.5)
EST. GFR  (AFRICAN AMERICAN): >60 ML/MIN/1.73 M^2
EST. GFR  (NON AFRICAN AMERICAN): >60 ML/MIN/1.73 M^2
GLUCOSE SERPL-MCNC: 180 MG/DL (ref 70–110)
HCT VFR BLD AUTO: 38.6 % (ref 40–54)
HGB BLD-MCNC: 13.1 G/DL (ref 14–18)
IMM GRANULOCYTES # BLD AUTO: 0.01 K/UL (ref 0–0.04)
IMM GRANULOCYTES NFR BLD AUTO: 0.2 % (ref 0–0.5)
LYMPHOCYTES # BLD AUTO: 2.8 K/UL (ref 1–4.8)
LYMPHOCYTES NFR BLD: 57.6 % (ref 18–48)
MAGNESIUM SERPL-MCNC: 1.6 MG/DL (ref 1.6–2.6)
MCH RBC QN AUTO: 29.4 PG (ref 27–31)
MCHC RBC AUTO-ENTMCNC: 33.9 G/DL (ref 32–36)
MCV RBC AUTO: 87 FL (ref 82–98)
MONOCYTES # BLD AUTO: 0.4 K/UL (ref 0.3–1)
MONOCYTES NFR BLD: 7.8 % (ref 4–15)
NEUTROPHILS # BLD AUTO: 1.5 K/UL (ref 1.8–7.7)
NEUTROPHILS NFR BLD: 30.9 % (ref 38–73)
NRBC BLD-RTO: 0 /100 WBC
PHOSPHATE SERPL-MCNC: 2.5 MG/DL (ref 2.7–4.5)
PLATELET # BLD AUTO: 164 K/UL (ref 150–450)
PMV BLD AUTO: 11.7 FL (ref 9.2–12.9)
POCT GLUCOSE: 179 MG/DL (ref 70–110)
POCT GLUCOSE: 187 MG/DL (ref 70–110)
POTASSIUM SERPL-SCNC: 4.1 MMOL/L (ref 3.5–5.1)
PROT SERPL-MCNC: 5.3 G/DL (ref 6–8.4)
RBC # BLD AUTO: 4.45 M/UL (ref 4.6–6.2)
SODIUM SERPL-SCNC: 137 MMOL/L (ref 136–145)
WBC # BLD AUTO: 4.9 K/UL (ref 3.9–12.7)

## 2021-06-02 PROCEDURE — 94761 N-INVAS EAR/PLS OXIMETRY MLT: CPT

## 2021-06-02 PROCEDURE — 36415 COLL VENOUS BLD VENIPUNCTURE: CPT | Performed by: STUDENT IN AN ORGANIZED HEALTH CARE EDUCATION/TRAINING PROGRAM

## 2021-06-02 PROCEDURE — 80053 COMPREHEN METABOLIC PANEL: CPT | Performed by: STUDENT IN AN ORGANIZED HEALTH CARE EDUCATION/TRAINING PROGRAM

## 2021-06-02 PROCEDURE — 97162 PT EVAL MOD COMPLEX 30 MIN: CPT | Performed by: PHYSICAL THERAPIST

## 2021-06-02 PROCEDURE — 85025 COMPLETE CBC W/AUTO DIFF WBC: CPT | Performed by: STUDENT IN AN ORGANIZED HEALTH CARE EDUCATION/TRAINING PROGRAM

## 2021-06-02 PROCEDURE — 83735 ASSAY OF MAGNESIUM: CPT | Performed by: STUDENT IN AN ORGANIZED HEALTH CARE EDUCATION/TRAINING PROGRAM

## 2021-06-02 PROCEDURE — 63600175 PHARM REV CODE 636 W HCPCS: Performed by: STUDENT IN AN ORGANIZED HEALTH CARE EDUCATION/TRAINING PROGRAM

## 2021-06-02 PROCEDURE — G0378 HOSPITAL OBSERVATION PER HR: HCPCS

## 2021-06-02 PROCEDURE — 84100 ASSAY OF PHOSPHORUS: CPT | Performed by: STUDENT IN AN ORGANIZED HEALTH CARE EDUCATION/TRAINING PROGRAM

## 2021-06-02 PROCEDURE — 96361 HYDRATE IV INFUSION ADD-ON: CPT

## 2021-06-02 PROCEDURE — 97165 OT EVAL LOW COMPLEX 30 MIN: CPT

## 2021-06-02 RX ADMIN — SODIUM CHLORIDE, SODIUM LACTATE, POTASSIUM CHLORIDE, AND CALCIUM CHLORIDE: .6; .31; .03; .02 INJECTION, SOLUTION INTRAVENOUS at 03:06

## 2021-06-09 ENCOUNTER — OFFICE VISIT (OUTPATIENT)
Dept: FAMILY MEDICINE | Facility: HOSPITAL | Age: 61
End: 2021-06-09
Payer: MEDICARE

## 2021-06-09 VITALS
HEIGHT: 69 IN | HEART RATE: 67 BPM | SYSTOLIC BLOOD PRESSURE: 185 MMHG | DIASTOLIC BLOOD PRESSURE: 89 MMHG | BODY MASS INDEX: 22.49 KG/M2 | WEIGHT: 151.88 LBS

## 2021-06-09 DIAGNOSIS — Z79.4 TYPE 2 DIABETES MELLITUS WITH DIABETIC PERIPHERAL ANGIOPATHY WITHOUT GANGRENE, WITH LONG-TERM CURRENT USE OF INSULIN: ICD-10-CM

## 2021-06-09 DIAGNOSIS — E11.51 TYPE 2 DIABETES MELLITUS WITH DIABETIC PERIPHERAL ANGIOPATHY WITHOUT GANGRENE, WITH LONG-TERM CURRENT USE OF INSULIN: ICD-10-CM

## 2021-06-09 DIAGNOSIS — I10 ESSENTIAL HYPERTENSION: Primary | ICD-10-CM

## 2021-06-09 PROCEDURE — 99213 OFFICE O/P EST LOW 20 MIN: CPT | Performed by: STUDENT IN AN ORGANIZED HEALTH CARE EDUCATION/TRAINING PROGRAM

## 2021-06-09 RX ORDER — AMLODIPINE BESYLATE 5 MG/1
5 TABLET ORAL DAILY
Qty: 30 TABLET | Refills: 11 | Status: SHIPPED | OUTPATIENT
Start: 2021-06-09 | End: 2022-06-09

## 2021-06-10 ENCOUNTER — TELEPHONE (OUTPATIENT)
Dept: FAMILY MEDICINE | Facility: HOSPITAL | Age: 61
End: 2021-06-10

## 2021-07-01 ENCOUNTER — PATIENT MESSAGE (OUTPATIENT)
Dept: ADMINISTRATIVE | Facility: OTHER | Age: 61
End: 2021-07-01

## 2021-08-17 ENCOUNTER — TELEPHONE (OUTPATIENT)
Dept: NEPHROLOGY | Facility: CLINIC | Age: 61
End: 2021-08-17

## 2021-08-17 DIAGNOSIS — Z86.19 HISTORY OF HEPATITIS C: ICD-10-CM

## 2021-08-17 DIAGNOSIS — I10 ESSENTIAL HYPERTENSION: Primary | ICD-10-CM

## 2021-08-17 DIAGNOSIS — E78.00 PURE HYPERCHOLESTEROLEMIA: ICD-10-CM

## 2021-08-24 ENCOUNTER — LAB VISIT (OUTPATIENT)
Dept: LAB | Facility: HOSPITAL | Age: 61
End: 2021-08-24
Attending: INTERNAL MEDICINE
Payer: MEDICARE

## 2021-08-24 DIAGNOSIS — E78.00 PURE HYPERCHOLESTEROLEMIA: ICD-10-CM

## 2021-08-24 DIAGNOSIS — Z86.19 HISTORY OF HEPATITIS C: ICD-10-CM

## 2021-08-24 DIAGNOSIS — I10 ESSENTIAL HYPERTENSION: ICD-10-CM

## 2021-08-24 LAB
ALBUMIN SERPL BCP-MCNC: 3.6 G/DL (ref 3.5–5.2)
ALP SERPL-CCNC: 62 U/L (ref 55–135)
ALT SERPL W/O P-5'-P-CCNC: 6 U/L (ref 10–44)
ANION GAP SERPL CALC-SCNC: 9 MMOL/L (ref 8–16)
AST SERPL-CCNC: 13 U/L (ref 10–40)
BASOPHILS # BLD AUTO: 0.04 K/UL (ref 0–0.2)
BASOPHILS NFR BLD: 0.6 % (ref 0–1.9)
BILIRUB SERPL-MCNC: 0.3 MG/DL (ref 0.1–1)
BUN SERPL-MCNC: 25 MG/DL (ref 8–23)
CALCIUM SERPL-MCNC: 9.1 MG/DL (ref 8.7–10.5)
CHLORIDE SERPL-SCNC: 105 MMOL/L (ref 95–110)
CO2 SERPL-SCNC: 23 MMOL/L (ref 23–29)
CREAT SERPL-MCNC: 1.6 MG/DL (ref 0.5–1.4)
DIFFERENTIAL METHOD: ABNORMAL
EOSINOPHIL # BLD AUTO: 0.1 K/UL (ref 0–0.5)
EOSINOPHIL NFR BLD: 1.1 % (ref 0–8)
ERYTHROCYTE [DISTWIDTH] IN BLOOD BY AUTOMATED COUNT: 12.3 % (ref 11.5–14.5)
EST. GFR  (AFRICAN AMERICAN): 52 ML/MIN/1.73 M^2
EST. GFR  (NON AFRICAN AMERICAN): 45 ML/MIN/1.73 M^2
GLUCOSE SERPL-MCNC: 227 MG/DL (ref 70–110)
HCT VFR BLD AUTO: 35.9 % (ref 40–54)
HGB BLD-MCNC: 12.3 G/DL (ref 14–18)
IMM GRANULOCYTES # BLD AUTO: 0.02 K/UL (ref 0–0.04)
IMM GRANULOCYTES NFR BLD AUTO: 0.3 % (ref 0–0.5)
LYMPHOCYTES # BLD AUTO: 3.2 K/UL (ref 1–4.8)
LYMPHOCYTES NFR BLD: 50.9 % (ref 18–48)
MAGNESIUM SERPL-MCNC: 1.7 MG/DL (ref 1.6–2.6)
MCH RBC QN AUTO: 30.3 PG (ref 27–31)
MCHC RBC AUTO-ENTMCNC: 34.3 G/DL (ref 32–36)
MCV RBC AUTO: 88 FL (ref 82–98)
MONOCYTES # BLD AUTO: 0.4 K/UL (ref 0.3–1)
MONOCYTES NFR BLD: 6.8 % (ref 4–15)
NEUTROPHILS # BLD AUTO: 2.6 K/UL (ref 1.8–7.7)
NEUTROPHILS NFR BLD: 40.3 % (ref 38–73)
NRBC BLD-RTO: 0 /100 WBC
PHOSPHATE SERPL-MCNC: 3 MG/DL (ref 2.7–4.5)
PLATELET # BLD AUTO: 204 K/UL (ref 150–450)
PMV BLD AUTO: 11.2 FL (ref 9.2–12.9)
POTASSIUM SERPL-SCNC: 4.5 MMOL/L (ref 3.5–5.1)
PROT SERPL-MCNC: 6.6 G/DL (ref 6–8.4)
RBC # BLD AUTO: 4.06 M/UL (ref 4.6–6.2)
SODIUM SERPL-SCNC: 137 MMOL/L (ref 136–145)
URATE SERPL-MCNC: 6.5 MG/DL (ref 3.4–7)
WBC # BLD AUTO: 6.37 K/UL (ref 3.9–12.7)

## 2021-08-24 PROCEDURE — 84100 ASSAY OF PHOSPHORUS: CPT | Performed by: INTERNAL MEDICINE

## 2021-08-24 PROCEDURE — 36415 COLL VENOUS BLD VENIPUNCTURE: CPT | Performed by: INTERNAL MEDICINE

## 2021-08-24 PROCEDURE — 80053 COMPREHEN METABOLIC PANEL: CPT | Performed by: INTERNAL MEDICINE

## 2021-08-24 PROCEDURE — 83735 ASSAY OF MAGNESIUM: CPT | Performed by: INTERNAL MEDICINE

## 2021-08-24 PROCEDURE — 84550 ASSAY OF BLOOD/URIC ACID: CPT | Performed by: INTERNAL MEDICINE

## 2021-08-24 PROCEDURE — 85025 COMPLETE CBC W/AUTO DIFF WBC: CPT | Performed by: INTERNAL MEDICINE

## 2023-06-19 NOTE — ED NOTES
Pt sitting on stretcher. NAD. Awaiting diagnostic test results.   Hemigard Postcare Instructions: The HEMIGARD strips are to remain completely dry for at least 5-7 days.

## 2023-07-22 ENCOUNTER — HOSPITAL ENCOUNTER (EMERGENCY)
Facility: HOSPITAL | Age: 63
Discharge: HOME | End: 2023-07-22
Attending: EMERGENCY MEDICINE
Payer: MEDICARE

## 2023-07-22 VITALS
SYSTOLIC BLOOD PRESSURE: 126 MMHG | OXYGEN SATURATION: 95 % | BODY MASS INDEX: 21.77 KG/M2 | HEIGHT: 69 IN | HEART RATE: 81 BPM | DIASTOLIC BLOOD PRESSURE: 65 MMHG | TEMPERATURE: 98 F | WEIGHT: 147 LBS | RESPIRATION RATE: 20 BRPM

## 2023-07-22 DIAGNOSIS — E86.0 DEHYDRATION: Primary | ICD-10-CM

## 2023-07-22 LAB
ALBUMIN SERPL BCP-MCNC: 4.4 G/DL (ref 3.5–5.2)
ALP SERPL-CCNC: 81 U/L (ref 55–135)
ALT SERPL W/O P-5'-P-CCNC: 12 U/L (ref 10–44)
ANION GAP SERPL CALC-SCNC: 16 MMOL/L (ref 8–16)
AST SERPL-CCNC: 17 U/L (ref 10–40)
BASOPHILS # BLD AUTO: 0.04 K/UL (ref 0–0.2)
BASOPHILS NFR BLD: 0.4 % (ref 0–1.9)
BILIRUB SERPL-MCNC: 0.3 MG/DL (ref 0.1–1)
BUN SERPL-MCNC: 27 MG/DL (ref 8–23)
CALCIUM SERPL-MCNC: 9.9 MG/DL (ref 8.7–10.5)
CHLORIDE SERPL-SCNC: 102 MMOL/L (ref 95–110)
CK SERPL-CCNC: 152 U/L (ref 20–200)
CO2 SERPL-SCNC: 21 MMOL/L (ref 23–29)
CREAT SERPL-MCNC: 2 MG/DL (ref 0.5–1.4)
DIFFERENTIAL METHOD: ABNORMAL
EOSINOPHIL # BLD AUTO: 0.1 K/UL (ref 0–0.5)
EOSINOPHIL NFR BLD: 0.9 % (ref 0–8)
ERYTHROCYTE [DISTWIDTH] IN BLOOD BY AUTOMATED COUNT: 12.7 % (ref 11.5–14.5)
EST. GFR  (NO RACE VARIABLE): 36 ML/MIN/1.73 M^2
GLUCOSE SERPL-MCNC: 125 MG/DL (ref 70–110)
HCT VFR BLD AUTO: 41 % (ref 40–54)
HGB BLD-MCNC: 13.7 G/DL (ref 14–18)
IMM GRANULOCYTES # BLD AUTO: 0.01 K/UL (ref 0–0.04)
IMM GRANULOCYTES NFR BLD AUTO: 0.1 % (ref 0–0.5)
LIPASE SERPL-CCNC: 32 U/L (ref 4–60)
LYMPHOCYTES # BLD AUTO: 4.4 K/UL (ref 1–4.8)
LYMPHOCYTES NFR BLD: 47.7 % (ref 18–48)
MAGNESIUM SERPL-MCNC: 1.7 MG/DL (ref 1.6–2.6)
MCH RBC QN AUTO: 29.1 PG (ref 27–31)
MCHC RBC AUTO-ENTMCNC: 33.4 G/DL (ref 32–36)
MCV RBC AUTO: 87 FL (ref 82–98)
MONOCYTES # BLD AUTO: 0.6 K/UL (ref 0.3–1)
MONOCYTES NFR BLD: 6.8 % (ref 4–15)
NEUTROPHILS # BLD AUTO: 4.1 K/UL (ref 1.8–7.7)
NEUTROPHILS NFR BLD: 44.1 % (ref 38–73)
NRBC BLD-RTO: 0 /100 WBC
PLATELET # BLD AUTO: 286 K/UL (ref 150–450)
PMV BLD AUTO: 11.6 FL (ref 9.2–12.9)
POTASSIUM SERPL-SCNC: 3.6 MMOL/L (ref 3.5–5.1)
PROT SERPL-MCNC: 8.1 G/DL (ref 6–8.4)
RBC # BLD AUTO: 4.71 M/UL (ref 4.6–6.2)
SODIUM SERPL-SCNC: 139 MMOL/L (ref 136–145)
TROPONIN I SERPL DL<=0.01 NG/ML-MCNC: 0.01 NG/ML (ref 0–0.03)
WBC # BLD AUTO: 9.17 K/UL (ref 3.9–12.7)

## 2023-07-22 PROCEDURE — 25000003 PHARM REV CODE 250: Performed by: EMERGENCY MEDICINE

## 2023-07-22 PROCEDURE — 96361 HYDRATE IV INFUSION ADD-ON: CPT

## 2023-07-22 PROCEDURE — 83690 ASSAY OF LIPASE: CPT | Performed by: EMERGENCY MEDICINE

## 2023-07-22 PROCEDURE — 82550 ASSAY OF CK (CPK): CPT | Performed by: EMERGENCY MEDICINE

## 2023-07-22 PROCEDURE — 83735 ASSAY OF MAGNESIUM: CPT | Performed by: EMERGENCY MEDICINE

## 2023-07-22 PROCEDURE — 80053 COMPREHEN METABOLIC PANEL: CPT | Performed by: EMERGENCY MEDICINE

## 2023-07-22 PROCEDURE — 85025 COMPLETE CBC W/AUTO DIFF WBC: CPT | Performed by: EMERGENCY MEDICINE

## 2023-07-22 PROCEDURE — 96360 HYDRATION IV INFUSION INIT: CPT

## 2023-07-22 PROCEDURE — 99285 EMERGENCY DEPT VISIT HI MDM: CPT

## 2023-07-22 PROCEDURE — 84484 ASSAY OF TROPONIN QUANT: CPT | Performed by: EMERGENCY MEDICINE

## 2023-07-22 RX ADMIN — SODIUM CHLORIDE 1000 ML: 9 INJECTION, SOLUTION INTRAVENOUS at 11:07

## 2023-07-22 RX ADMIN — SODIUM CHLORIDE 1000 ML: 9 INJECTION, SOLUTION INTRAVENOUS at 12:07

## 2023-07-22 NOTE — ED NOTES
Patient resting comfortably. Patient states he is feeling better with IVF's infusing. VS stable. Pt instructed to urinate. Unable to provide urine sample at this time. Will cont to monitor.

## 2023-07-22 NOTE — ED NOTES
Patient presents to ed with c/o weakness, feeling fatigued, and feeling dizzy. Patient reports working outside today and yesterday. Patient denies pain. Denies chest pain. Reports SOB that started this morning. No obvious distress. Resp even and unlabored. Pt 99% on RA. Sinus tach present on the monitor. Patient has no other complaints. IVF's infusing. Will cont to monitor.

## 2023-07-23 NOTE — ED PROVIDER NOTES
"Encounter Date: 7/22/2023       History     Chief Complaint   Patient presents with    Shortness of Breath     Pt c/o shortness of breath that started this morning. Pt has had 2 episodes of vomiting this morning and is currently nauseated. Pt also has weakness and states "I think I'm dehydrated."     Patient is a 63-year-old male who says he became extremely weak while at work this morning.  He says he took his diabetic medication on an empty stomach before eating and then began vomiting.  He also experienced mild shortness of breath after this.  No syncope.    Review of patient's allergies indicates:  No Known Allergies  Past Medical History:   Diagnosis Date    Cervicalgia     Diabetes mellitus     History of hepatitis C, s/p successful Rx w/ cure (SVR12 - 9/2020)     Hypercholesteremia     Hypertension     Jaw pain     Lipoma of back     Lumbago     Premature beats, unspecified     Sciatica     Scoliosis (and kyphoscoliosis), idiopathic     Undiagnosed cardiac murmurs      Past Surgical History:   Procedure Laterality Date    COLONOSCOPY N/A 7/14/2020    Procedure: COLONOSCOPY;  Surgeon: Magdalena Sales MD;  [facility];  Service: Endoscopy;  Laterality: N/A;     No family history on file.  Social History     Tobacco Use    Smoking status: Former     Types: Cigarettes    Smokeless tobacco: Never   Substance Use Topics    Alcohol use: No    Drug use: Not Currently     Types: Marijuana     Comment: daily     Review of Systems   Constitutional:  Negative for fever.   Respiratory:  Positive for shortness of breath.    Gastrointestinal:  Positive for nausea and vomiting. Negative for diarrhea.   Neurological:  Positive for weakness and light-headedness.     Physical Exam     Initial Vitals [07/22/23 1031]   BP Pulse Resp Temp SpO2   118/85 (!) 147 20 98.1 °F (36.7 °C) 99 %      MAP       --         Physical Exam    Nursing note and vitals reviewed.  Constitutional: No distress.   HENT:   Head: " Normocephalic and atraumatic.   Eyes: EOM are normal. Pupils are equal, round, and reactive to light.   Neck: Neck supple.   Normal range of motion.  Cardiovascular:            Tachycardia.   Pulmonary/Chest: Breath sounds normal.   Abdominal: Abdomen is soft.   Mild midepigastric tenderness without guarding or rebound. There is no rebound and no guarding.   Musculoskeletal:         General: Normal range of motion.      Cervical back: Normal range of motion and neck supple.     Neurological: He is alert and oriented to person, place, and time. No cranial nerve deficit.   Skin: Skin is warm and dry.   Psychiatric: He has a normal mood and affect.       ED Course   Critical Care    Date/Time: 7/22/2023 3:00 PM  Performed by: Theo Mckay MD  Authorized by: Theo Mckay MD   Direct patient critical care time: 60 minutes  Ordering / reviewing critical care time: 15 minutes  Documentation critical care time: 15 minutes  Total critical care time (exclusive of procedural time) : 90 minutes  Critical care was time spent personally by me on the following activities: evaluation of patient's response to treatment, examination of patient, obtaining history from patient or surrogate, ordering and performing treatments and interventions, ordering and review of laboratory studies, pulse oximetry and re-evaluation of patient's condition.      Labs Reviewed   CBC W/ AUTO DIFFERENTIAL - Abnormal; Notable for the following components:       Result Value    Hemoglobin 13.7 (*)     All other components within normal limits   COMPREHENSIVE METABOLIC PANEL - Abnormal; Notable for the following components:    CO2 21 (*)     Glucose 125 (*)     BUN 27 (*)     Creatinine 2.0 (*)     eGFR 36 (*)     All other components within normal limits   CK   TROPONIN I   MAGNESIUM   LIPASE     EKG Readings: (Independently Interpreted)   Initial Reading: No STEMI. Rhythm: Sinus Tachycardia. Heart Rate: 146. ST Segments: Normal ST  Segments. T Waves: Normal.     Imaging Results    None          Medications   sodium chloride 0.9% bolus 1,000 mL 1,000 mL (0 mLs Intravenous Stopped 7/22/23 1233)   sodium chloride 0.9% bolus 1,000 mL 1,000 mL (0 mLs Intravenous Stopped 7/22/23 1345)     Medical Decision Making:   Initial Assessment:   63-year-old male with weakness and vomiting.  Patient feels as though he is dehydrated as he was sweating a lot at work prior to vomiting.  Differential Diagnosis:   Dehydration, heat exhaustion, heat stroke, electrolyte abnormality, cardiac arrhythmia, gastritis, pancreatitis.  Clinical Tests:   Lab Tests: Ordered and Reviewed  The following lab test(s) were unremarkable: CBC, Lipase and Troponin       <> Summary of Lab: CMP shows a glucose of 125, BUN of 27 and creatinine of 2.0.  ED Management:  Patient was given IV fluids here in the emergency department which resolved his tachycardia.  Lab work is reassuring.  Patient reported feeling much better with no complaints at the time of discharge.  Repeat abdominal exam yields no tenderness.  Most likely etiology of his symptoms was dehydration.  He has been advised to follow up with his primary physician as soon as able and increase his fluid intake.  He may return to the emergency department for any recurrence of symptoms or any other urgent concerns.                        Clinical Impression:   Final diagnoses:  [E86.0] Dehydration (Primary)        ED Disposition Condition    Discharge Stable          ED Prescriptions    None       Follow-up Information       Follow up With Specialties Details Why Contact Info    Dave Donaldson NP Nephrology Schedule an appointment as soon as possible for a visit   [address] [facility]  [address]  [facility] [address] [phone]      [facility] - Emergency Dept Emergency Medicine  As needed [address] [facility] [address] [phone]             Theo Mckay MD  07/22/23  1959

## 2024-07-18 DIAGNOSIS — N18.30 CHRONIC KIDNEY DISEASE, STAGE III (MODERATE): Primary | ICD-10-CM

## 2024-07-18 DIAGNOSIS — N18.30 CHRONIC RENAL DISEASE, STAGE III: ICD-10-CM

## 2024-09-25 ENCOUNTER — HOSPITAL ENCOUNTER (EMERGENCY)
Facility: HOSPITAL | Age: 64
Discharge: HOME | End: 2024-09-25
Attending: STUDENT IN AN ORGANIZED HEALTH CARE EDUCATION/TRAINING PROGRAM
Payer: MEDICARE

## 2024-09-25 VITALS
SYSTOLIC BLOOD PRESSURE: 197 MMHG | OXYGEN SATURATION: 99 % | WEIGHT: 147 LBS | HEIGHT: 69 IN | DIASTOLIC BLOOD PRESSURE: 92 MMHG | HEART RATE: 61 BPM | TEMPERATURE: 99 F | RESPIRATION RATE: 20 BRPM | BODY MASS INDEX: 21.77 KG/M2

## 2024-09-25 DIAGNOSIS — R79.89 ELEVATED BRAIN NATRIURETIC PEPTIDE (BNP) LEVEL: ICD-10-CM

## 2024-09-25 DIAGNOSIS — R51.9 ACUTE NONINTRACTABLE HEADACHE, UNSPECIFIED HEADACHE TYPE: ICD-10-CM

## 2024-09-25 DIAGNOSIS — I10 HYPERTENSION, UNSPECIFIED TYPE: Primary | ICD-10-CM

## 2024-09-25 DIAGNOSIS — R10.9 ABDOMINAL PAIN: ICD-10-CM

## 2024-09-25 DIAGNOSIS — R07.9 CHEST PAIN, UNSPECIFIED TYPE: ICD-10-CM

## 2024-09-25 DIAGNOSIS — I10 ESSENTIAL HYPERTENSION: ICD-10-CM

## 2024-09-25 LAB
ALBUMIN SERPL BCP-MCNC: 4 G/DL (ref 3.5–5.2)
ALP SERPL-CCNC: 88 U/L (ref 55–135)
ALT SERPL W/O P-5'-P-CCNC: 8 U/L (ref 10–44)
ANION GAP SERPL CALC-SCNC: 10 MMOL/L (ref 8–16)
AST SERPL-CCNC: 17 U/L (ref 10–40)
BASOPHILS # BLD AUTO: 0.02 K/UL (ref 0–0.2)
BASOPHILS NFR BLD: 0.3 % (ref 0–1.9)
BILIRUB SERPL-MCNC: 0.3 MG/DL (ref 0.1–1)
BNP SERPL-MCNC: 345 PG/ML (ref 0–99)
BUN SERPL-MCNC: 10 MG/DL (ref 8–23)
CALCIUM SERPL-MCNC: 9.6 MG/DL (ref 8.7–10.5)
CHLORIDE SERPL-SCNC: 106 MMOL/L (ref 95–110)
CK SERPL-CCNC: 204 U/L (ref 20–200)
CO2 SERPL-SCNC: 25 MMOL/L (ref 23–29)
CREAT SERPL-MCNC: 1.3 MG/DL (ref 0.5–1.4)
DIFFERENTIAL METHOD BLD: ABNORMAL
EOSINOPHIL # BLD AUTO: 0 K/UL (ref 0–0.5)
EOSINOPHIL NFR BLD: 0.2 % (ref 0–8)
ERYTHROCYTE [DISTWIDTH] IN BLOOD BY AUTOMATED COUNT: 13.2 % (ref 11.5–14.5)
EST. GFR  (NO RACE VARIABLE): >60 ML/MIN/1.73 M^2
GLUCOSE SERPL-MCNC: 145 MG/DL (ref 70–110)
HCT VFR BLD AUTO: 44.3 % (ref 40–54)
HGB BLD-MCNC: 13.9 G/DL (ref 14–18)
IMM GRANULOCYTES # BLD AUTO: 0.01 K/UL (ref 0–0.04)
IMM GRANULOCYTES NFR BLD AUTO: 0.2 % (ref 0–0.5)
LIPASE SERPL-CCNC: 13 U/L (ref 4–60)
LYMPHOCYTES # BLD AUTO: 1.7 K/UL (ref 1–4.8)
LYMPHOCYTES NFR BLD: 25.7 % (ref 18–48)
MAGNESIUM SERPL-MCNC: 1.7 MG/DL (ref 1.6–2.6)
MCH RBC QN AUTO: 28.2 PG (ref 27–31)
MCHC RBC AUTO-ENTMCNC: 31.4 G/DL (ref 32–36)
MCV RBC AUTO: 90 FL (ref 82–98)
MONOCYTES # BLD AUTO: 0.4 K/UL (ref 0.3–1)
MONOCYTES NFR BLD: 6.9 % (ref 4–15)
NEUTROPHILS # BLD AUTO: 4.3 K/UL (ref 1.8–7.7)
NEUTROPHILS NFR BLD: 66.7 % (ref 38–73)
NRBC BLD-RTO: 0 /100 WBC
OHS QRS DURATION: 86 MS
OHS QTC CALCULATION: 455 MS
PLATELET # BLD AUTO: 208 K/UL (ref 150–450)
PMV BLD AUTO: 12.1 FL (ref 9.2–12.9)
POTASSIUM SERPL-SCNC: 4.2 MMOL/L (ref 3.5–5.1)
PROT SERPL-MCNC: 7.6 G/DL (ref 6–8.4)
RBC # BLD AUTO: 4.93 M/UL (ref 4.6–6.2)
SODIUM SERPL-SCNC: 141 MMOL/L (ref 136–145)
TROPONIN I SERPL DL<=0.01 NG/ML-MCNC: 0.02 NG/ML (ref 0–0.03)
WBC # BLD AUTO: 6.41 K/UL (ref 3.9–12.7)

## 2024-09-25 PROCEDURE — 63600175 PHARM REV CODE 636 W HCPCS: Performed by: STUDENT IN AN ORGANIZED HEALTH CARE EDUCATION/TRAINING PROGRAM

## 2024-09-25 PROCEDURE — 84484 ASSAY OF TROPONIN QUANT: CPT | Performed by: STUDENT IN AN ORGANIZED HEALTH CARE EDUCATION/TRAINING PROGRAM

## 2024-09-25 PROCEDURE — 83690 ASSAY OF LIPASE: CPT | Performed by: STUDENT IN AN ORGANIZED HEALTH CARE EDUCATION/TRAINING PROGRAM

## 2024-09-25 PROCEDURE — 96374 THER/PROPH/DIAG INJ IV PUSH: CPT

## 2024-09-25 PROCEDURE — 25000003 PHARM REV CODE 250: Performed by: STUDENT IN AN ORGANIZED HEALTH CARE EDUCATION/TRAINING PROGRAM

## 2024-09-25 PROCEDURE — 83735 ASSAY OF MAGNESIUM: CPT | Performed by: STUDENT IN AN ORGANIZED HEALTH CARE EDUCATION/TRAINING PROGRAM

## 2024-09-25 PROCEDURE — 93005 ELECTROCARDIOGRAM TRACING: CPT

## 2024-09-25 PROCEDURE — 96375 TX/PRO/DX INJ NEW DRUG ADDON: CPT

## 2024-09-25 PROCEDURE — 83880 ASSAY OF NATRIURETIC PEPTIDE: CPT | Performed by: STUDENT IN AN ORGANIZED HEALTH CARE EDUCATION/TRAINING PROGRAM

## 2024-09-25 PROCEDURE — 96361 HYDRATE IV INFUSION ADD-ON: CPT

## 2024-09-25 PROCEDURE — 99285 EMERGENCY DEPT VISIT HI MDM: CPT | Mod: 25

## 2024-09-25 PROCEDURE — 85025 COMPLETE CBC W/AUTO DIFF WBC: CPT | Performed by: STUDENT IN AN ORGANIZED HEALTH CARE EDUCATION/TRAINING PROGRAM

## 2024-09-25 PROCEDURE — 82550 ASSAY OF CK (CPK): CPT | Performed by: STUDENT IN AN ORGANIZED HEALTH CARE EDUCATION/TRAINING PROGRAM

## 2024-09-25 PROCEDURE — 80053 COMPREHEN METABOLIC PANEL: CPT | Performed by: STUDENT IN AN ORGANIZED HEALTH CARE EDUCATION/TRAINING PROGRAM

## 2024-09-25 PROCEDURE — 93010 ELECTROCARDIOGRAM REPORT: CPT | Mod: ,,, | Performed by: INTERNAL MEDICINE

## 2024-09-25 RX ORDER — AMLODIPINE BESYLATE 10 MG/1
10 TABLET ORAL DAILY
Qty: 90 TABLET | Refills: 3 | Status: SHIPPED | OUTPATIENT
Start: 2024-09-25 | End: 2025-03-24

## 2024-09-25 RX ORDER — ONDANSETRON HYDROCHLORIDE 2 MG/ML
4 INJECTION, SOLUTION INTRAVENOUS
Status: COMPLETED | OUTPATIENT
Start: 2024-09-25 | End: 2024-09-25

## 2024-09-25 RX ORDER — AMLODIPINE BESYLATE 5 MG/1
5 TABLET ORAL
Status: COMPLETED | OUTPATIENT
Start: 2024-09-25 | End: 2024-09-25

## 2024-09-25 RX ORDER — HYDRALAZINE HYDROCHLORIDE 20 MG/ML
10 INJECTION INTRAMUSCULAR; INTRAVENOUS
Status: COMPLETED | OUTPATIENT
Start: 2024-09-25 | End: 2024-09-25

## 2024-09-25 RX ADMIN — AMLODIPINE BESYLATE 5 MG: 5 TABLET ORAL at 04:09

## 2024-09-25 RX ADMIN — HYDRALAZINE HYDROCHLORIDE 10 MG: 20 INJECTION, SOLUTION INTRAMUSCULAR; INTRAVENOUS at 04:09

## 2024-09-25 RX ADMIN — SODIUM CHLORIDE, POTASSIUM CHLORIDE, SODIUM LACTATE AND CALCIUM CHLORIDE 1000 ML: 600; 310; 30; 20 INJECTION, SOLUTION INTRAVENOUS at 02:09

## 2024-09-25 RX ADMIN — ONDANSETRON 4 MG: 2 INJECTION INTRAMUSCULAR; INTRAVENOUS at 04:09

## 2024-09-25 NOTE — DISCHARGE INSTRUCTIONS

## 2024-09-25 NOTE — ED PROVIDER NOTES
Encounter Date: 9/25/2024       History     Chief Complaint   Patient presents with    Abdominal Pain    Headache     Started yesterday, now also having dizziness. Non compliant with heart medications.      HPI    65 yo M w/HTN, HLD, presenting with mild frontal headache, orthostatic lightheadedness, upper abdominal cramping since yesterday.     Reports he was working outside yesterday cutting grass, last night developed headache, generalized weakness, muscle cramping, upper abdomen cramping/chest pains.    Headache improved today, no double vision, no numbness/weakness in extremities, was having lightheadedness with standing and ambulating earlier today.    No cough, no fever/no vomiting/no diarrhea, no calf swelling.      Review of patient's allergies indicates:  No Known Allergies  Past Medical History:   Diagnosis Date    Cervicalgia     Diabetes mellitus     History of hepatitis C, s/p successful Rx w/ cure (SVR12 - 9/2020)     Hypercholesteremia     Hypertension     Jaw pain     Lipoma of back     Lumbago     Premature beats, unspecified     Sciatica     Scoliosis (and kyphoscoliosis), idiopathic     Undiagnosed cardiac murmurs      Past Surgical History:   Procedure Laterality Date    COLONOSCOPY N/A 7/14/2020    Procedure: COLONOSCOPY;  Surgeon: Magdalena Sales MD;  Location: [facility];  Service: Endoscopy;  Laterality: N/A;     No family history on file.  Social History     Tobacco Use    Smoking status: Former     Types: Cigarettes    Smokeless tobacco: Never   Substance Use Topics    Alcohol use: No    Drug use: Not Currently     Types: Marijuana     Comment: daily     Review of Systems    Physical Exam     Initial Vitals   BP Pulse Resp Temp SpO2   09/25/24 1255 09/25/24 1255 09/25/24 1255 09/25/24 1256 09/25/24 1255   (!) 197/83 92 18 98.6 °F (37 °C) 99 %      MAP       --                Physical Exam    Constitutional: He appears well-developed and well-nourished.   HENT:   Head: Normocephalic and  atraumatic.   Eyes: EOM are normal.   Cardiovascular:  Normal rate and regular rhythm.           Pulmonary/Chest: Effort normal.   Abdominal: He exhibits no distension.   Musculoskeletal:         General: No tenderness or edema.     Neurological: He is alert and oriented to person, place, and time.   No nystagmus, shoulder shrug intact, face symmetric, tongue midline, finger-nose-finger nl, sensation intact and equal in face/arms/legs, upper extremity and lower extremity flexor and extensor strength equal and intact, ambulation at baseline     Skin: Skin is warm and dry.   Psychiatric: He has a normal mood and affect.         ED Course   Procedures  Labs Reviewed   CBC W/ AUTO DIFFERENTIAL - Abnormal       Result Value    WBC 6.41      RBC 4.93      Hemoglobin 13.9 (*)     Hematocrit 44.3      MCV 90      MCH 28.2      MCHC 31.4 (*)     RDW 13.2      Platelets 208      MPV 12.1      Immature Granulocytes 0.2      Gran # (ANC) 4.3      Immature Grans (Abs) 0.01      Lymph # 1.7      Mono # 0.4      Eos # 0.0      Baso # 0.02      nRBC 0      Gran % 66.7      Lymph % 25.7      Mono % 6.9      Eosinophil % 0.2      Basophil % 0.3      Differential Method Automated     COMPREHENSIVE METABOLIC PANEL - Abnormal    Sodium 141      Potassium 4.2      Chloride 106      CO2 25      Glucose 145 (*)     BUN 10      Creatinine 1.3      Calcium 9.6      Total Protein 7.6      Albumin 4.0      Total Bilirubin 0.3      Alkaline Phosphatase 88      AST 17      ALT 8 (*)     eGFR >60.0      Anion Gap 10     B-TYPE NATRIURETIC PEPTIDE - Abnormal     (*)    CK - Abnormal     (*)    LIPASE    Lipase 13     MAGNESIUM    Magnesium 1.7     TROPONIN I    Troponin I 0.017          ECG Results              EKG 12-lead (Final result)        Collection Time Result Time QRS Duration OHS QTC Calculation    09/25/24 13:39:22 09/25/24 15:40:53 86 455                     Final result by Interface, Lab In [facility] (09/25/24 15:41:02)                    Narrative:    Test Reason : R10.9,    Vent. Rate : 052 BPM     Atrial Rate : 052 BPM     P-R Int : 164 ms          QRS Dur : 086 ms      QT Int : 490 ms       P-R-T Axes : 061 080 076 degrees     QTc Int : 455 ms    Sinus bradycardia  Otherwise normal ECG  When compared with ECG of 22-JUL-2023 10:33,  Vent. rate has decreased BY  94 BPM  ST no longer depressed in Inferior leads  ST no longer depressed in Anterior leads  Confirmed by Amos Lopez MD ([id number]) on 9/25/2024 3:40:51 PM    Referred By: System System           Confirmed By:Amos Lopez MD                                  Imaging Results              X-Ray Chest AP Portable (Final result)  Result time 09/25/24 15:52:59      Final result by Dony Moyer MD (09/25/24 15:52:59)                   Impression:      No acute cardiopulmonary disease      Electronically signed by: Dony Moyer MD  Date:    09/25/2024  Time:    15:52               Narrative:    EXAMINATION:  XR CHEST AP PORTABLE    CLINICAL HISTORY:  dyspnea;    TECHNIQUE:  Single frontal view of the chest was performed.    COMPARISON:  09/11/2020    FINDINGS:  The heart size and pulmonary vessels are normal.  Mediastinum shows aortic atherosclerosis.  Lungs are expanded and clear.  No pleural fluid detected.  The skeletal structures are intact.  Monitor wires overlie the chest.                                       Medications   lactated ringers bolus 1,000 mL (0 mLs Intravenous Stopped 9/25/24 1511)   amLODIPine tablet 5 mg (5 mg Oral Given 9/25/24 1618)   hydrALAZINE injection 10 mg (10 mg Intravenous Given 9/25/24 1618)   ondansetron injection 4 mg (4 mg Intravenous Given 9/25/24 1618)     Medical Decision Making  65 yo M w/HTN, HLD, presenting with mild frontal headache, orthostatic lightheadedness, upper abdominal cramping since yesterday.     Differential diagnosis includes but is not limited to: hypertensive emergency, orthostatic near syncope, dehydration, electrolyte  abnormality, ACS    No exertional chest pain, location of pain is more consistent with epigastric/gastric pain, burning in nature, with no radiation to arms, was constant in nature, no history of coronary artery disease, less concern for ACS. Troponin normal, with pain beginning last night.     Headache resolved prior to anti-hypertensive medication given, asymptomatic hypertension of 210-220 consistent with uncontrolled HTN, with patient stopping his anti-hypertensives as he ran out of medications. Will discharge with renewed prescriptions for amlodipine. Labs otherwise normal, no further indication for CT imaging, no focal neuro deficits.    I discussed with the patient/family the diagnosis, treatment plan, indications for return to the emergency department, as well as for expected follow-up. The patient/family verbalized an understanding. The patient/family is asked if there were any questions or concerns, which were addressed to patient/family satisfaction. Patient/family understands and is agreeable to the plan.       Please put in 30 minutes of critical care due to patient having severe HTN requiring IV hydralazine.  Separate from teaching and exclusive of procedure and ekg time  Includes:  Time at bedside  Time reviewing test results  Time discussing case with staff  Time documenting the medical record  Time spent with family members  Time spent with consults  Management        Amount and/or Complexity of Data Reviewed  Labs: ordered. Decision-making details documented in ED Course.  Radiology: ordered.    Risk  Prescription drug management.               ED Course as of 09/25/24 2316   Wed Sep 25, 2024   1503 CPK(!): 204 [LF]   1504 EKG with sinus bradycardia rate of 52, normal axis, no STEMI, no acute ischemic changes, U-waves present most likely due to bradycardia, magnesium normal, potassium normal [LF]   1514 Patient reassessed, with resolution of headache, eye pain, reports no symptoms at this time [LF]    1525 Troponin I: 0.017  Upper abdominal pain since last night, not indicated for repeat trop [LF]      ED Course User Index  [LF] Monica Armendariz MD                           Clinical Impression:  Final diagnoses:  [R10.9] Abdominal pain  [I10] Hypertension, unspecified type (Primary)  [R51.9] Acute nonintractable headache, unspecified headache type  [R07.9] Chest pain, unspecified type  [R79.89] Elevated brain natriuretic peptide (BNP) level          ED Disposition Condition    Discharge Stable          ED Prescriptions       Medication Sig Dispense Start Date End Date Auth. Provider    amLODIPine (NORVASC) 10 MG tablet Take 1 tablet (10 mg total) by mouth once daily. 90 tablet 9/25/2024 3/24/2025 Monica Armendariz MD          Follow-up Information       Follow up With Specialties Details Why Contact Info Additional Information    Dave Donaldson, NP Nephrology  [facility]  [address]  [facility] [address]  [phone]       [facility] - Cardiology - 3rd Fl Cardiology   [address] [facility] [address] [facility] [address] [phone] Cardiology Services Clinics - 3rd floor             Monica Armendariz MD  09/25/24 2847

## 2024-09-25 NOTE — ED NOTES
Romero Kwan Robert, a 64 y.o. male presents to the ED w/ complaint of frontal ha 8/10, blurred vision, sob, dizziness, abd pain/midsternal presssure. -bloodthinners. Pmhx dm, htn, has not been consistently taking meds    Triage note:  Chief Complaint   Patient presents with    Abdominal Pain    Headache     Started yesterday, now also having dizziness. Non compliant with heart medications.      Review of patient's allergies indicates:  No Known Allergies  Past Medical History:   Diagnosis Date    Cervicalgia     Diabetes mellitus     History of hepatitis C, s/p successful Rx w/ cure (SVR12 - 9/2020)     Hypercholesteremia     Hypertension     Jaw pain     Lipoma of back     Lumbago     Premature beats, unspecified     Sciatica     Scoliosis (and kyphoscoliosis), idiopathic     Undiagnosed cardiac murmurs

## 2024-10-13 ENCOUNTER — HOSPITAL ENCOUNTER (EMERGENCY)
Facility: HOSPITAL | Age: 64
Discharge: HOME | End: 2024-10-14
Attending: STUDENT IN AN ORGANIZED HEALTH CARE EDUCATION/TRAINING PROGRAM
Payer: MEDICARE

## 2024-10-13 DIAGNOSIS — I10 HYPERTENSION, UNSPECIFIED TYPE: ICD-10-CM

## 2024-10-13 DIAGNOSIS — H11.32 CONJUNCTIVAL HEMORRHAGE OF LEFT EYE: Primary | ICD-10-CM

## 2024-10-13 DIAGNOSIS — R51.9 NONINTRACTABLE HEADACHE, UNSPECIFIED CHRONICITY PATTERN, UNSPECIFIED HEADACHE TYPE: ICD-10-CM

## 2024-10-13 DIAGNOSIS — R42 DIZZINESS: ICD-10-CM

## 2024-10-13 LAB
ALBUMIN SERPL BCP-MCNC: 4 G/DL (ref 3.5–5.2)
ALP SERPL-CCNC: 92 U/L (ref 55–135)
ALT SERPL W/O P-5'-P-CCNC: 11 U/L (ref 10–44)
ANION GAP SERPL CALC-SCNC: 10 MMOL/L (ref 8–16)
AST SERPL-CCNC: 18 U/L (ref 10–40)
BASOPHILS # BLD AUTO: 0.01 K/UL (ref 0–0.2)
BASOPHILS NFR BLD: 0.2 % (ref 0–1.9)
BILIRUB SERPL-MCNC: 0.3 MG/DL (ref 0.1–1)
BILIRUB UR QL STRIP: NEGATIVE
BNP SERPL-MCNC: 128 PG/ML (ref 0–99)
BUN SERPL-MCNC: 11 MG/DL (ref 8–23)
BUN SERPL-MCNC: 12 MG/DL (ref 6–30)
CALCIUM SERPL-MCNC: 9.8 MG/DL (ref 8.7–10.5)
CHLORIDE SERPL-SCNC: 104 MMOL/L (ref 95–110)
CHLORIDE SERPL-SCNC: 104 MMOL/L (ref 95–110)
CLARITY UR REFRACT.AUTO: CLEAR
CO2 SERPL-SCNC: 26 MMOL/L (ref 23–29)
COLOR UR AUTO: COLORLESS
CREAT SERPL-MCNC: 1.1 MG/DL (ref 0.5–1.4)
CREAT SERPL-MCNC: 1.1 MG/DL (ref 0.5–1.4)
DIFFERENTIAL METHOD BLD: ABNORMAL
EOSINOPHIL # BLD AUTO: 0 K/UL (ref 0–0.5)
EOSINOPHIL NFR BLD: 0.4 % (ref 0–8)
ERYTHROCYTE [DISTWIDTH] IN BLOOD BY AUTOMATED COUNT: 13.2 % (ref 11.5–14.5)
EST. GFR  (NO RACE VARIABLE): >60 ML/MIN/1.73 M^2
GLUCOSE SERPL-MCNC: 104 MG/DL (ref 70–110)
GLUCOSE SERPL-MCNC: 99 MG/DL (ref 70–110)
GLUCOSE UR QL STRIP: NEGATIVE
HCT VFR BLD AUTO: 44.5 % (ref 40–54)
HCT VFR BLD CALC: 45 %PCV (ref 36–54)
HGB BLD-MCNC: 14.1 G/DL (ref 14–18)
HGB UR QL STRIP: ABNORMAL
HIV 1+2 AB+HIV1 P24 AG SERPL QL IA: NORMAL
IMM GRANULOCYTES # BLD AUTO: 0.02 K/UL (ref 0–0.04)
IMM GRANULOCYTES NFR BLD AUTO: 0.4 % (ref 0–0.5)
KETONES UR QL STRIP: NEGATIVE
LEUKOCYTE ESTERASE UR QL STRIP: NEGATIVE
LYMPHOCYTES # BLD AUTO: 1.7 K/UL (ref 1–4.8)
LYMPHOCYTES NFR BLD: 30.3 % (ref 18–48)
MCH RBC QN AUTO: 27.8 PG (ref 27–31)
MCHC RBC AUTO-ENTMCNC: 31.7 G/DL (ref 32–36)
MCV RBC AUTO: 88 FL (ref 82–98)
MONOCYTES # BLD AUTO: 0.3 K/UL (ref 0.3–1)
MONOCYTES NFR BLD: 6.1 % (ref 4–15)
NEUTROPHILS # BLD AUTO: 3.5 K/UL (ref 1.8–7.7)
NEUTROPHILS NFR BLD: 62.6 % (ref 38–73)
NITRITE UR QL STRIP: NEGATIVE
NRBC BLD-RTO: 0 /100 WBC
PH UR STRIP: 7 [PH] (ref 5–8)
PLATELET # BLD AUTO: 230 K/UL (ref 150–450)
PMV BLD AUTO: 11.8 FL (ref 9.2–12.9)
POC IONIZED CALCIUM: 1.1 MMOL/L (ref 1.06–1.42)
POC TCO2 (MEASURED): 27 MMOL/L (ref 23–29)
POCT GLUCOSE: 88 MG/DL (ref 70–110)
POTASSIUM BLD-SCNC: 4.2 MMOL/L (ref 3.5–5.1)
POTASSIUM SERPL-SCNC: 4 MMOL/L (ref 3.5–5.1)
PROT SERPL-MCNC: 8 G/DL (ref 6–8.4)
PROT UR QL STRIP: NEGATIVE
RBC # BLD AUTO: 5.07 M/UL (ref 4.6–6.2)
SAMPLE: NORMAL
SODIUM BLD-SCNC: 140 MMOL/L (ref 136–145)
SODIUM SERPL-SCNC: 140 MMOL/L (ref 136–145)
SP GR UR STRIP: >=1.03 (ref 1–1.03)
TROPONIN I SERPL DL<=0.01 NG/ML-MCNC: <0.006 NG/ML (ref 0–0.03)
URN SPEC COLLECT METH UR: ABNORMAL
WBC # BLD AUTO: 5.57 K/UL (ref 3.9–12.7)

## 2024-10-13 PROCEDURE — 99285 EMERGENCY DEPT VISIT HI MDM: CPT | Mod: 25

## 2024-10-13 PROCEDURE — 93005 ELECTROCARDIOGRAM TRACING: CPT

## 2024-10-13 PROCEDURE — 25000003 PHARM REV CODE 250: Performed by: EMERGENCY MEDICINE

## 2024-10-13 PROCEDURE — 96374 THER/PROPH/DIAG INJ IV PUSH: CPT

## 2024-10-13 PROCEDURE — 80053 COMPREHEN METABOLIC PANEL: CPT | Performed by: STUDENT IN AN ORGANIZED HEALTH CARE EDUCATION/TRAINING PROGRAM

## 2024-10-13 PROCEDURE — 25000003 PHARM REV CODE 250: Performed by: STUDENT IN AN ORGANIZED HEALTH CARE EDUCATION/TRAINING PROGRAM

## 2024-10-13 PROCEDURE — 93010 ELECTROCARDIOGRAM REPORT: CPT | Mod: ,,, | Performed by: INTERNAL MEDICINE

## 2024-10-13 PROCEDURE — 87389 HIV-1 AG W/HIV-1&-2 AB AG IA: CPT | Performed by: PHYSICIAN ASSISTANT

## 2024-10-13 PROCEDURE — 83880 ASSAY OF NATRIURETIC PEPTIDE: CPT | Performed by: STUDENT IN AN ORGANIZED HEALTH CARE EDUCATION/TRAINING PROGRAM

## 2024-10-13 PROCEDURE — 84484 ASSAY OF TROPONIN QUANT: CPT | Performed by: STUDENT IN AN ORGANIZED HEALTH CARE EDUCATION/TRAINING PROGRAM

## 2024-10-13 PROCEDURE — 81003 URINALYSIS AUTO W/O SCOPE: CPT | Performed by: STUDENT IN AN ORGANIZED HEALTH CARE EDUCATION/TRAINING PROGRAM

## 2024-10-13 PROCEDURE — 63600175 PHARM REV CODE 636 W HCPCS: Performed by: STUDENT IN AN ORGANIZED HEALTH CARE EDUCATION/TRAINING PROGRAM

## 2024-10-13 PROCEDURE — 82962 GLUCOSE BLOOD TEST: CPT

## 2024-10-13 PROCEDURE — 85025 COMPLETE CBC W/AUTO DIFF WBC: CPT | Performed by: STUDENT IN AN ORGANIZED HEALTH CARE EDUCATION/TRAINING PROGRAM

## 2024-10-13 PROCEDURE — 80047 BASIC METABLC PNL IONIZED CA: CPT

## 2024-10-13 PROCEDURE — 25500020 PHARM REV CODE 255: Performed by: STUDENT IN AN ORGANIZED HEALTH CARE EDUCATION/TRAINING PROGRAM

## 2024-10-13 RX ORDER — TETRACAINE HYDROCHLORIDE 5 MG/ML
2 SOLUTION OPHTHALMIC
Status: COMPLETED | OUTPATIENT
Start: 2024-10-13 | End: 2024-10-13

## 2024-10-13 RX ORDER — LABETALOL HYDROCHLORIDE 5 MG/ML
10 INJECTION, SOLUTION INTRAVENOUS
Status: COMPLETED | OUTPATIENT
Start: 2024-10-13 | End: 2024-10-13

## 2024-10-13 RX ORDER — MECLIZINE HCL 12.5 MG 12.5 MG/1
50 TABLET ORAL
Status: COMPLETED | OUTPATIENT
Start: 2024-10-13 | End: 2024-10-13

## 2024-10-13 RX ORDER — DIAZEPAM 5 MG/1
5 TABLET ORAL
Status: COMPLETED | OUTPATIENT
Start: 2024-10-13 | End: 2024-10-13

## 2024-10-13 RX ORDER — ACETAMINOPHEN 500 MG
1000 TABLET ORAL
Status: COMPLETED | OUTPATIENT
Start: 2024-10-13 | End: 2024-10-13

## 2024-10-13 RX ADMIN — ACETAMINOPHEN 1000 MG: 500 TABLET ORAL at 08:10

## 2024-10-13 RX ADMIN — FLUORESCEIN SODIUM 1 EACH: 1 STRIP OPHTHALMIC at 08:10

## 2024-10-13 RX ADMIN — DIAZEPAM 5 MG: 5 TABLET ORAL at 11:10

## 2024-10-13 RX ADMIN — MECLIZINE 50 MG: 12.5 TABLET ORAL at 08:10

## 2024-10-13 RX ADMIN — TETRACAINE HYDROCHLORIDE 2 DROP: 5 SOLUTION OPHTHALMIC at 08:10

## 2024-10-13 RX ADMIN — IOHEXOL 100 ML: 350 INJECTION, SOLUTION INTRAVENOUS at 09:10

## 2024-10-13 RX ADMIN — LABETALOL HYDROCHLORIDE 10 MG: 5 INJECTION INTRAVENOUS at 08:10

## 2024-10-14 VITALS
BODY MASS INDEX: 21.77 KG/M2 | DIASTOLIC BLOOD PRESSURE: 68 MMHG | HEIGHT: 69 IN | RESPIRATION RATE: 17 BRPM | WEIGHT: 147 LBS | TEMPERATURE: 98 F | OXYGEN SATURATION: 100 % | SYSTOLIC BLOOD PRESSURE: 150 MMHG | HEART RATE: 60 BPM

## 2024-10-14 LAB
OHS QRS DURATION: 86 MS
OHS QTC CALCULATION: 429 MS

## 2024-10-14 RX ORDER — BENAZEPRIL HYDROCHLORIDE 40 MG/1
20 TABLET ORAL DAILY
Qty: 30 TABLET | Refills: 0 | Status: SHIPPED | OUTPATIENT
Start: 2024-10-14

## 2024-10-14 NOTE — ED NOTES
I-STAT Chem-8+ Results:   Value Reference Range   Sodium 140 136-145 mmol/L   Potassium  4.2 3.5-5.1 mmol/L   Chloride 104  mmol/L   Ionized Calcium 1.10 1.06-1.42 mmol/L   CO2 (measured) 27 23-29 mmol/L   Glucose 99  mg/dL   BUN 12 6-30 mg/dL   Creatinine 1.1 0.5-1.4 mg/dL   Hematocrit 45 36-54%

## 2024-10-14 NOTE — ED PROVIDER NOTES
Encounter Date: 10/13/2024       History     Chief Complaint   Patient presents with    Headache     Pt. Reporting headache beginning yesterday. Pt. Also reporting left eye redness.      HPI  The patient is a 64-year-old male with history of hypertension, hypercholesterolemia, diabetes who presents for headache.  Onset of headache yesterday.  Headache has been gradual, persistent, right-sided.  This afternoon, patient developed dizziness that he describes as feeling off balance.  The dizziness has been mostly persistent but waxes and wanes in intensity.  He states that he felt severe nausea with it but no vomiting.  No chest pain, shortness of breath, abdominal pain.  No fever or chills.  No recent illnesses.  He was not had any recent falls.  He was also concerned because he noticed that his left eye was red.  He states that when he woke up his eyes are normal but then at some point this afternoon he noticed that his left eye was red.  He denies any eye pain but states that his eyes feel dry and he has some blurry vision in his bilateral eyes.  He denies any vision loss.  No neck pain.  No extremity weakness or numbness.  No difficulty speaking or swallowing.    Review of patient's allergies indicates:  No Known Allergies  Past Medical History:   Diagnosis Date    Cervicalgia     Diabetes mellitus     History of hepatitis C, s/p successful Rx w/ cure (SVR12 - 9/2020)     Hypercholesteremia     Hypertension     Jaw pain     Lipoma of back     Lumbago     Premature beats, unspecified     Sciatica     Scoliosis (and kyphoscoliosis), idiopathic     Undiagnosed cardiac murmurs      Past Surgical History:   Procedure Laterality Date    COLONOSCOPY N/A 7/14/2020    Procedure: COLONOSCOPY;  Surgeon: Magdalena Sales MD;  Location: [facility];  Service: Endoscopy;  Laterality: N/A;     No family history on file.  Social History     Tobacco Use    Smoking status: Former     Types: Cigarettes    Smokeless tobacco: Never    Substance Use Topics    Alcohol use: No    Drug use: Not Currently     Types: Marijuana     Comment: daily     Review of Systems  A full review of systems was obtained, see HPI for pertinent positives.    Physical Exam     Initial Vitals [10/13/24 1824]   BP Pulse Resp Temp SpO2   (!) 183/82 65 18 98.9 °F (37.2 °C) 97 %      MAP       --         Physical Exam  Constitutional: No acute distress, well-appearing, nontoxic  HENT: Normocephalic, atraumatic  Eyes: Pupils equal round and reactive, EOMI, no pain with eye movements, left eye injected, see picture below, seems consistent with conjunctival hemorrhage  Respiratory: Non-labored, lungs clear  Cardiovascular: Well perfused, normal rate, regular rhythm  Gastrointestinal: Soft, non-tender, non-distended  Integumentary: Warm and dry  Musculoskeletal: No deformity  Neurological: Awake and alert, oriented, 5/5 motor and sensation light touch intact in all extremities, cranial nerves 2-12 grossly intact, normal finger-to-nose and heel-to-shin, no nystagmus  Psychiatric: Cooperative         ED Course   Procedures  Labs Reviewed   CBC W/ AUTO DIFFERENTIAL - Abnormal       Result Value    WBC 5.57      RBC 5.07      Hemoglobin 14.1      Hematocrit 44.5      MCV 88      MCH 27.8      MCHC 31.7 (*)     RDW 13.2      Platelets 230      MPV 11.8      Immature Granulocytes 0.4      Gran # (ANC) 3.5      Immature Grans (Abs) 0.02      Lymph # 1.7      Mono # 0.3      Eos # 0.0      Baso # 0.01      nRBC 0      Gran % 62.6      Lymph % 30.3      Mono % 6.1      Eosinophil % 0.4      Basophil % 0.2      Differential Method Automated     B-TYPE NATRIURETIC PEPTIDE - Abnormal     (*)    HIV 1 / 2 ANTIBODY    HIV 1/2 Ag/Ab Non-reactive      Narrative:     Release to patient->Immediate   COMPREHENSIVE METABOLIC PANEL    Sodium 140      Potassium 4.0      Chloride 104      CO2 26      Glucose 104      BUN 11      Creatinine 1.1      Calcium 9.8      Total Protein 8.0       Albumin 4.0      Total Bilirubin 0.3      Alkaline Phosphatase 92      AST 18      ALT 11      eGFR >60.0      Anion Gap 10     TROPONIN I    Troponin I <0.006     URINALYSIS, REFLEX TO URINE CULTURE   POCT GLUCOSE    POCT Glucose 88     ISTAT PROCEDURE    POC Glucose 99      POC BUN 12      POC Creatinine 1.1      POC Sodium 140      POC Potassium 4.2      POC Chloride 104      POC TCO2 (MEASURED) 27      POC Ionized Calcium 1.10      POC Hematocrit 45      Sample TAWNY     ISTAT CHEM8          Imaging Results               CTA Head and Neck (xpd) (Final result)  Result time 10/13/24 22:36:24      Final result by Michele Fu MD (10/13/24 22:36:24)                   Impression:      No acute intracranial findings.  Specifically, no evidence of intracranial hemorrhage or major vascular distribution infarct.    CTA demonstrates atherosclerotic plaque at the carotid bifurcations representing 50% stenosis per NASCET criteria bilaterally.  There is predominantly soft plaque noted at the left carotid bifurcation.    No acute large vessel occlusion or flow-limiting stenosis.    Tiny excrescence protruding from the M1 segment right MCA, possible small aneurysm or infundibulum.    Developmental venous anomaly in the left basal ganglia with associated calcifications in the left basal ganglia.  MRI without and with contrast may be obtained for further assessment.    Severe disc height loss with associated cortical irregularity and sclerotic changes at C6-C7.  Findings may relate to degenerative changes, however spondylodiscitis may have a similar appearance.    This report was flagged in Epic as abnormal.    Electronically signed by resident: Maxx Escobar  Date:    10/13/2024  Time:    21:48    Electronically signed by: Michele Fu MD  Date:    10/13/2024  Time:    22:36               Narrative:    EXAMINATION:  CTA HEAD AND NECK (XPD)    CLINICAL HISTORY:  Dizziness, persistent/recurrent, cardiac or vascular cause  suspected;headache, dizziness, blurred vision, painless red eye on the left;    TECHNIQUE:  Axial CT images obtained throughout the region of the head before and after the administration of intravenous contrast.  CT angiogram was performed through the cervical and intracranial vasculature during the IV bolus administration of 100mL of Omnipaque 350.  Multiplanar MPR and MIP reformats were performed.    COMPARISON:  MRI brain 09/12/2018    FINDINGS:  CT head:    The ventricles are normal in size without evidence of hydrocephalus.    The brain appears within normal limits. No parenchymal mass, hemorrhage, edema or major vascular distribution infarct.    Unilateral left basal ganglia calcification.    No extra-axial blood or fluid collections.    The cranium appears intact. Mastoid air cells and paranasal sinuses are essentially clear.    No neck mass or cervical lymphadenopathy.    Subcentimeter hypoattenuating nodule in the left thyroid, which does not meet criteria for follow-up.    Lung apices are clear.    Degenerative changes of the cervicothoracic spine.  There is severe disc height loss at C6-C7 with associated cortical irregularity of the endplates and sclerotic changes.    CTA:    The aortic arch demonstratesmoderate calcified atherosclerosis with no significant stenosis at the major branch vessel origins.    The right common carotid artery is normal in caliber. Prominent mixed calcified and soft atherosclerotic plaque at the carotid bifurcation representing 50% stenosis per NASCET criteria.The right internal carotid artery is normal in caliber.    The left common carotid artery is normal in caliber. Prominent soft atherosclerotic plaque at the carotid bifurcation representing 50% stenosis per NASCET criteria.The left internal carotid artery is normal in caliber.    The right vertebral artery is normal in caliber.    The left vertebral artery is normal in caliber.    Basilar artery is within normal limits  without focal abnormality.    The proximal anterior, middle, and posterior cerebral arteries demonstrate no evidence of significant stenosis or focal occlusion.  Tiny excrescence protruding along the M1 segment right MCA measuring 1-2 mm, possible small aneurysm or infundibulum (axial series 5, image 432 and sagittal series 605, image 50).    There is a developmental venous anomaly in the left basal ganglia.    There is a venous anomaly along the left lateral ventricle.                                       Medications   TETRAcaine HCl (PF) 0.5 % Drop 2 drop (2 drops Left Eye Given by Provider 10/13/24 2000)   fluorescein ophthalmic strip 1 each (1 each Left Eye Given by Provider 10/13/24 2000)   meclizine tablet 50 mg (50 mg Oral Given 10/13/24 2032)   acetaminophen tablet 1,000 mg (1,000 mg Oral Given 10/13/24 2032)   labetaloL injection 10 mg (10 mg Intravenous Given 10/13/24 2033)   iohexoL (OMNIPAQUE 350) injection 75 mL (100 mLs Intravenous Given 10/13/24 2136)     Medical Decision Making  The patient is a 64-year-old male with history of hypertension, hypercholesterolemia, diabetes who presents for headache, dizziness and left eye redness.  Patient is hypertensive upon arrival but otherwise hemodynamically stable.  He has a normal neurologic exam.  No neck pain or rigidity.  The dizziness seems vertiginous in nature based on the patient's description.  He was also reporting associated headache.  Differential includes but not limited to peripheral vertigo, central vertigo, hypertensive urgency, hypertensive emergency, acute stroke, migraine headache, glaucoma, conjunctival hemorrhage.  Will perform eye exam.  Will check blood work including cardiac labs to assess for cardiac causes for dizziness and evaluate for end-organ dysfunction due to concerns for hypertensive emergency.  Will obtain CTA to assess for dizziness and headache.     Eye exam performed.  Patient has 20/30 vision uncorrected and does not wear  glasses or contacts.  Pressure slightly elevated but patient not having any eye pain.  Pressure less than 40 and his bilateral eyes.  No dye uptake on Wood's lamp exam or evidence of corneal abrasion.  He does appear to have a conjunctival hemorrhage on the left eye.  Do not feel that his headache an eye redness is from glaucoma.  He does admit to having chronically dry eyes.  His eye dryness and mild blurred vision immediately resolved after tetracaine was placed in the patient's eyes.    On re-evaluation, blood pressure improving after IV antihypertensives.  Lab work also reviewed and reassuring.  CTA still pending at time of changeover.  Patient was still reporting dizziness.  Plan to consider MRI if CTA negative.  Low threshold for admission due to concerns for hypertensive urgency and need for further blood pressure monitoring and management.  Care transitioned to oncoming staff.    Amount and/or Complexity of Data Reviewed  Labs: ordered.  Radiology: ordered.    Risk  OTC drugs.  Prescription drug management.               ED Course as of 10/13/24 2251   Sun Oct 13, 2024   1955 Only takes amlodipine for HTN which he took earlier today [NN]   2025 EKG with sinus bradycardia, rate 58, no STEMI [NN]   2149 BP(!): 166/81  BP improving [NN]   2206 IOP right eye 32, left eye 34 [NN]   2206 No dye uptake on wood's lamp exam [NN]   2209 Visual acuity: 20/30 in left, right and both eyes [NN]      ED Course User Index  [NN] Marie Lance MD                           Clinical Impression:  Final diagnoses:  [R42] Dizziness  [H11.32] Conjunctival hemorrhage of left eye (Primary)  [I10] Hypertension, unspecified type  [R51.9] Nonintractable headache, unspecified chronicity pattern, unspecified headache type                 Marie Lance MD  10/13/24 2256

## 2024-10-14 NOTE — ED TRIAGE NOTES
Romero Bowens Jr., a 64 y.o. male presents to the ED w/ complaint of generalized weakness and headache onset earlier today. Pt states he felt his sugar was getting low, got dizzy, slowly got himself down in the chair until it passed. Pt states he drank some juice and felt better however his BP is elevated and there is now redness to his left eye with blurred vision. Pt denies any chest pain or SOB.     Triage note:  Chief Complaint   Patient presents with    Headache     Pt. Reporting headache beginning yesterday. Pt. Also reporting left eye redness.      Review of patient's allergies indicates:  No Known Allergies  Past Medical History:   Diagnosis Date    Cervicalgia     Diabetes mellitus     History of hepatitis C, s/p successful Rx w/ cure (SVR12 - 9/2020)     Hypercholesteremia     Hypertension     Jaw pain     Lipoma of back     Lumbago     Premature beats, unspecified     Sciatica     Scoliosis (and kyphoscoliosis), idiopathic     Undiagnosed cardiac murmurs

## 2024-10-14 NOTE — ED NOTES
Bed: [facility]  Expected date:   Expected time:   Means of arrival:   Comments:  Rm.9

## 2024-10-14 NOTE — PROVIDER PROGRESS NOTES - EMERGENCY DEPT.
Encounter Date: 10/13/2024    ED Physician Progress Notes        I assumed care of patient at sign out pending: imaging, re-eval.    63yo M who presents with headache. Hx of HTN, DM, dyslipidemia. Takes amlodipine. Seen a few days ago and treated for ?hypertensive emergency, dc'd. Came back today with R sided HA, dizziness. Seems more like peripheral vertigo. Also has B/l eye redness worse on left and blurred vision and b/l eye dryness. IOP 30s b/l. Chronic yellowing. Injected on the left. Fluorescein normal. Visual acuity fine. Got labetalol for HTN - improved and HA better. Still having dizziness. Waiting on CTA. If negative, MRI to rule out stroke. Dispo pending.     Summary of Results:    MRI Brain Without Contrast   Final Result      No acute abnormality.      Small lacunar infarct left basal ganglia.      Flow voids left basal ganglia and periventricular region with increased signal on FLAIR imaging corresponding to developmental venous anomaly demonstrated on recent CTA.         Electronically signed by: Nhan Carpenter MD   Date:    10/14/2024   Time:    00:42      CTA Head and Neck (xpd)   Final Result   Abnormal      No acute intracranial findings.  Specifically, no evidence of intracranial hemorrhage or major vascular distribution infarct.      CTA demonstrates atherosclerotic plaque at the carotid bifurcations representing 50% stenosis per NASCET criteria bilaterally.  There is predominantly soft plaque noted at the left carotid bifurcation.      No acute large vessel occlusion or flow-limiting stenosis.      Tiny excrescence protruding from the M1 segment right MCA, possible small aneurysm or infundibulum.      Developmental venous anomaly in the left basal ganglia with associated calcifications in the left basal ganglia.  MRI without and with contrast may be obtained for further assessment.      Severe disc height loss with associated cortical irregularity and sclerotic changes at C6-C7.  Findings may  relate to degenerative changes, however spondylodiscitis may have a similar appearance.      This report was flagged in Epic as abnormal.      Electronically signed by resident: Maxx Escobar   Date:    10/13/2024   Time:    21:48      Electronically signed by: Michele Fu MD   Date:    10/13/2024   Time:    22:36        .imaging shows an old stroke, nothing acute. It also shows incidental placques.  The patient feels better. BP controlled. He would like to go home. He states he stopped taking his Lotensin because he forgot to get it refilled. Will add this onto his amlodipine. However he understands it is important to f/u soon with his PCP for optimization to prevent future strokes. Return precautions given    Disposition:  discharge.

## 2024-10-14 NOTE — DISCHARGE INSTRUCTIONS
Continue taking your amlodipine and restart your Lotensin (benazepril).  It is very important that you follow up with your primary doctor. Your CT did show some incidental findings like placques that could lead to strokes as well as an old stroke. You need to follow up with your doctor to make sure you are on all of the right medicines to prevent a large, debilitating or fatal stroke

## 2024-10-14 NOTE — ED NOTES
Telemetry Verification   Patient placed on Telemetry Box  Verified with War Room  Box # [id number]   Monitor Tech ED   Rate 64   Rhythm NSR

## 2024-11-03 ENCOUNTER — HOSPITAL ENCOUNTER (EMERGENCY)
Facility: HOSPITAL | Age: 64
Discharge: HOME | End: 2024-11-04
Attending: STUDENT IN AN ORGANIZED HEALTH CARE EDUCATION/TRAINING PROGRAM
Payer: MEDICARE

## 2024-11-03 DIAGNOSIS — S09.90XA INJURY OF HEAD, INITIAL ENCOUNTER: Primary | ICD-10-CM

## 2024-11-03 DIAGNOSIS — S06.0XAA CONCUSSION WITH UNKNOWN LOSS OF CONSCIOUSNESS STATUS, INITIAL ENCOUNTER: ICD-10-CM
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
## 2024-11-03 DIAGNOSIS — M48.02 CERVICAL SPINAL STENOSIS: ICD-10-CM

## 2024-11-03 LAB
ALBUMIN SERPL BCP-MCNC: 3.4 G/DL (ref 3.5–5.2)
ALP SERPL-CCNC: 79 U/L (ref 40–150)
ALT SERPL W/O P-5'-P-CCNC: 8 U/L (ref 10–44)
ANION GAP SERPL CALC-SCNC: 8 MMOL/L (ref 8–16)
AST SERPL-CCNC: 14 U/L (ref 10–40)
BASOPHILS # BLD AUTO: 0.01 K/UL (ref 0–0.2)
BASOPHILS NFR BLD: 0.1 % (ref 0–1.9)
BILIRUB SERPL-MCNC: 0.2 MG/DL (ref 0.1–1)
BNP SERPL-MCNC: 135 PG/ML (ref 0–99)
BUN SERPL-MCNC: 11 MG/DL (ref 8–23)
CALCIUM SERPL-MCNC: 8.9 MG/DL (ref 8.7–10.5)
CHLORIDE SERPL-SCNC: 106 MMOL/L (ref 95–110)
CO2 SERPL-SCNC: 28 MMOL/L (ref 23–29)
CREAT SERPL-MCNC: 1.2 MG/DL (ref 0.5–1.4)
DIFFERENTIAL METHOD BLD: ABNORMAL
EOSINOPHIL # BLD AUTO: 0.1 K/UL (ref 0–0.5)
EOSINOPHIL NFR BLD: 1.6 % (ref 0–8)
ERYTHROCYTE [DISTWIDTH] IN BLOOD BY AUTOMATED COUNT: 13.4 % (ref 11.5–14.5)
EST. GFR  (NO RACE VARIABLE): >60 ML/MIN/1.73 M^2
GLUCOSE SERPL-MCNC: 163 MG/DL (ref 70–110)
HCT VFR BLD AUTO: 39.6 % (ref 40–54)
HGB BLD-MCNC: 12.7 G/DL (ref 14–18)
IMM GRANULOCYTES # BLD AUTO: 0.01 K/UL (ref 0–0.04)
IMM GRANULOCYTES NFR BLD AUTO: 0.1 % (ref 0–0.5)
LYMPHOCYTES # BLD AUTO: 2.5 K/UL (ref 1–4.8)
LYMPHOCYTES NFR BLD: 37.3 % (ref 18–48)
MCH RBC QN AUTO: 28.2 PG (ref 27–31)
MCHC RBC AUTO-ENTMCNC: 32.1 G/DL (ref 32–36)
MCV RBC AUTO: 88 FL (ref 82–98)
MONOCYTES # BLD AUTO: 0.5 K/UL (ref 0.3–1)
MONOCYTES NFR BLD: 7.4 % (ref 4–15)
NEUTROPHILS # BLD AUTO: 3.6 K/UL (ref 1.8–7.7)
NEUTROPHILS NFR BLD: 53.5 % (ref 38–73)
NRBC BLD-RTO: 0 /100 WBC
PLATELET # BLD AUTO: 191 K/UL (ref 150–450)
PMV BLD AUTO: 11.3 FL (ref 9.2–12.9)
POTASSIUM SERPL-SCNC: 4.6 MMOL/L (ref 3.5–5.1)
PROT SERPL-MCNC: 6.7 G/DL (ref 6–8.4)
RBC # BLD AUTO: 4.51 M/UL (ref 4.6–6.2)
SODIUM SERPL-SCNC: 142 MMOL/L (ref 136–145)
TROPONIN I SERPL DL<=0.01 NG/ML-MCNC: <0.006 NG/ML (ref 0–0.03)
WBC # BLD AUTO: 6.73 K/UL (ref 3.9–12.7)

## 2024-11-03 PROCEDURE — 85025 COMPLETE CBC W/AUTO DIFF WBC: CPT | Performed by: STUDENT IN AN ORGANIZED HEALTH CARE EDUCATION/TRAINING PROGRAM

## 2024-11-03 PROCEDURE — 93010 ELECTROCARDIOGRAM REPORT: CPT | Mod: ,,, | Performed by: INTERNAL MEDICINE

## 2024-11-03 PROCEDURE — 84484 ASSAY OF TROPONIN QUANT: CPT | Performed by: STUDENT IN AN ORGANIZED HEALTH CARE EDUCATION/TRAINING PROGRAM

## 2024-11-03 PROCEDURE — 83880 ASSAY OF NATRIURETIC PEPTIDE: CPT | Performed by: STUDENT IN AN ORGANIZED HEALTH CARE EDUCATION/TRAINING PROGRAM

## 2024-11-03 PROCEDURE — 99285 EMERGENCY DEPT VISIT HI MDM: CPT | Mod: 25

## 2024-11-03 PROCEDURE — 80053 COMPREHEN METABOLIC PANEL: CPT | Performed by: STUDENT IN AN ORGANIZED HEALTH CARE EDUCATION/TRAINING PROGRAM

## 2024-11-03 PROCEDURE — 93005 ELECTROCARDIOGRAM TRACING: CPT

## 2024-11-03 PROCEDURE — 25000003 PHARM REV CODE 250: Performed by: STUDENT IN AN ORGANIZED HEALTH CARE EDUCATION/TRAINING PROGRAM

## 2024-11-03 RX ORDER — ACETAMINOPHEN 500 MG
1000 TABLET ORAL
Status: COMPLETED | OUTPATIENT
Start: 2024-11-03 | End: 2024-11-03

## 2024-11-03 RX ADMIN — ACETAMINOPHEN 1000 MG: 500 TABLET ORAL at 10:11

## 2024-11-04 VITALS
HEART RATE: 66 BPM | HEIGHT: 69 IN | OXYGEN SATURATION: 97 % | DIASTOLIC BLOOD PRESSURE: 95 MMHG | WEIGHT: 147 LBS | BODY MASS INDEX: 21.77 KG/M2 | SYSTOLIC BLOOD PRESSURE: 203 MMHG | RESPIRATION RATE: 17 BRPM | TEMPERATURE: 98 F

## 2024-11-04 PROCEDURE — 25000003 PHARM REV CODE 250

## 2024-11-04 RX ORDER — IBUPROFEN 600 MG/1
600 TABLET ORAL
Status: COMPLETED | OUTPATIENT
Start: 2024-11-04 | End: 2024-11-04

## 2024-11-04 RX ADMIN — IBUPROFEN 600 MG: 600 TABLET, FILM COATED ORAL at 01:11

## 2024-11-04 NOTE — ED TRIAGE NOTES
Romero Bowens , a 64 y.o. male presents to the ED w/ complaint of fall x 3 days ago. + LOC generalized weakness and pain all over since.     Triage note:  Chief Complaint   Patient presents with    Fall     Patient tripped and fell over a book shelf and fell face forward on concrete floor three days ago. +LOC. -Blood thinners. Has been feeling generalized body weakness, HA, facial and abdominal pain.      Review of patient's allergies indicates:  No Known Allergies  Past Medical History:   Diagnosis Date    Cervicalgia     Diabetes mellitus     History of hepatitis C, s/p successful Rx w/ cure (SVR12 - 9/2020)     Hypercholesteremia     Hypertension     Jaw pain     Lipoma of back     Lumbago     Premature beats, unspecified     Sciatica     Scoliosis (and kyphoscoliosis), idiopathic     Undiagnosed cardiac murmurs

## 2024-11-04 NOTE — DISCHARGE INSTRUCTIONS
You were seen in the emergency room after a fall.  We scanned your head, face, and neck and did not find any evidence of fracture or bleeding in your brain.  Based on your symptoms I believe that you have a concussion.  You may notice nausea, headache, and difficulty concentrating.  Please follow up with your primary care doctor for further management. Additionally, CT scan showed that you had extensive calcifications of your carotid arteries. Radiology is recommending an outpatient ultrasound of your carotids    Please return to the emergency department if you have fainting, more than 3 episodes of vomiting, severe headache that does not improve with medications, weakness, or any new concern.

## 2024-11-04 NOTE — ED PROVIDER NOTES
Encounter Date: 11/3/2024       History     Chief Complaint   Patient presents with    Fall     Patient tripped and fell over a book shelf and fell face forward on concrete floor three days ago. +LOC. -Blood thinners. Has been feeling generalized body weakness, HA, facial and abdominal pain.      HPI  Mr Bowens is a 64 YOM with pmh DM, HLD, HTN presenting with fall.     Patient reports tripping and falling on his face approximately 3 days ago.  States that his pant leg got caught on a book shelf and resulted in his fall.  Says that he was face hard and may have had loss of consciousness.  States that he had immediate pain in his face and head.  He also complains of blurry vision that has progressively worsened.  States that in the past day or so he has been noticing some dizziness, lightheadedness, and instability when walking.  Today started to feel nauseated and short of breath.  He continues to endorse headache that only slightly improves with Tylenol.  States that he ran out of Tylenol and took a Goody powder today.  Denies any numbness or weakness or incontinence.  Endorses some dental pain that has been improving.  Does not think that he has any loose teeth.  Denies any chest pain.    Review of patient's allergies indicates:  No Known Allergies  Past Medical History:   Diagnosis Date    Cervicalgia     Diabetes mellitus     History of hepatitis C, s/p successful Rx w/ cure (SVR12 - 9/2020)     Hypercholesteremia     Hypertension     Jaw pain     Lipoma of back     Lumbago     Premature beats, unspecified     Sciatica     Scoliosis (and kyphoscoliosis), idiopathic     Undiagnosed cardiac murmurs      Past Surgical History:   Procedure Laterality Date    COLONOSCOPY N/A 7/14/2020    Procedure: COLONOSCOPY;  Surgeon: Magdalena Sales MD;  Location: [facility];  Service: Endoscopy;  Laterality: N/A;     No family history on file.  Social History     Tobacco Use    Smoking status: Former     Types: Cigarettes     Smokeless tobacco: Never   Substance Use Topics    Alcohol use: No    Drug use: Not Currently     Types: Marijuana     Comment: daily     Review of Systems    Physical Exam     Initial Vitals [11/03/24 2043]   BP Pulse Resp Temp SpO2   (!) 192/88 61 16 98.2 °F (36.8 °C) 99 %      MAP       --         Physical Exam    Constitutional: He appears well-developed and well-nourished.   HENT:   Head: Normocephalic.   Eyes: Conjunctivae and EOM are normal. Pupils are equal, round, and reactive to light.   Neck:   No pain with axial load   Normal range of motion.  Cardiovascular:  Normal rate, regular rhythm, normal heart sounds and intact distal pulses.           Pulmonary/Chest: Breath sounds normal. No respiratory distress. He has no wheezes. He has no rhonchi. He has no rales. He exhibits no tenderness.   Abdominal: Abdomen is soft. Bowel sounds are normal. He exhibits no distension. There is no abdominal tenderness.   Musculoskeletal:         General: Normal range of motion.      Cervical back: Normal range of motion.     Neurological: He is alert and oriented to person, place, and time. He has normal strength. He displays no tremor. No cranial nerve deficit or sensory deficit. Coordination normal. GCS eye subscore is 4. GCS verbal subscore is 5. GCS motor subscore is 6.   Skin: Skin is warm and dry. Capillary refill takes less than 2 seconds.   Psychiatric: He has a normal mood and affect.         ED Course   Procedures  Labs Reviewed   CBC W/ AUTO DIFFERENTIAL - Abnormal       Result Value    WBC 6.73      RBC 4.51 (*)     Hemoglobin 12.7 (*)     Hematocrit 39.6 (*)     MCV 88      MCH 28.2      MCHC 32.1      RDW 13.4      Platelets 191      MPV 11.3      Immature Granulocytes 0.1      Gran # (ANC) 3.6      Immature Grans (Abs) 0.01      Lymph # 2.5      Mono # 0.5      Eos # 0.1      Baso # 0.01      nRBC 0      Gran % 53.5      Lymph % 37.3      Mono % 7.4      Eosinophil % 1.6      Basophil % 0.1      Differential  Method Automated     COMPREHENSIVE METABOLIC PANEL - Abnormal    Sodium 142      Potassium 4.6      Chloride 106      CO2 28      Glucose 163 (*)     BUN 11      Creatinine 1.2      Calcium 8.9      Total Protein 6.7      Albumin 3.4 (*)     Total Bilirubin 0.2      Alkaline Phosphatase 79      AST 14      ALT 8 (*)     eGFR >60.0      Anion Gap 8     B-TYPE NATRIURETIC PEPTIDE - Abnormal     (*)    TROPONIN I    Troponin I <0.006     URINALYSIS, REFLEX TO URINE CULTURE          Imaging Results              CT Head Without Contrast (In process)                      CT Cervical Spine Without Contrast (In process)                      CT Maxillofacial Without Contrast (In process)                      X-Ray Chest AP Portable (Final result)  Result time 11/03/24 22:36:52      Final result by Michele Fu MD (11/03/24 22:36:52)                   Impression:      No acute intrathoracic process.      Electronically signed by: Michele Fu MD  Date:    11/03/2024  Time:    22:36               Narrative:    EXAMINATION:  XR CHEST AP PORTABLE    CLINICAL HISTORY:  Shortness of breath.    TECHNIQUE:  Single frontal view of the chest was performed.    COMPARISON:  09/25/2024.    FINDINGS:  The trachea is unremarkable.  There are calcifications of the aortic knob.  The cardiomediastinal silhouette is within normal limits.  There is no evidence of free air beneath the hemidiaphragms.  There are no pleural effusions.  There is no evidence of a pneumothorax.  There is no evidence of pneumomediastinum.  No airspace opacity is present.  There are mild degenerative changes in the osseous structures.                                       Medications   acetaminophen tablet 1,000 mg (1,000 mg Oral Given 11/3/24 4076)     Medical Decision Making  Mr Bowens is a 64 YOM with pmh DM, HLD, HTN presenting with fall.  On arrival patient was hypertensive but otherwise is hemodynamically stable.  He appears comfortable on exam.  Neuro  exam is normal.  He does have reproducible tenderness with palpation of his scalp and face but there was no hematoma or bruising.  No loose teeth.  Given symptoms differentials include intracranial hemorrhage, skull fracture though less likely given no step-offs or dawson sign, concussion, cardiac arrhythmia, ACS, electrolyte abnormality, pneumonia, UTI.  Plan to obtain CBC, CMP, UA, BNP, troponin, chest x-ray, CT head and neck.  We will give Tylenol for pain control.    Independent review of imaging does not show acute bleed or fracture of head, face, neck.  At time of sign-out formal radiology interpretation of CT and x-ray imaging is still pending.  Patient was signed out to Dr. Ryder at shift change.           ED Course as of 11/04/24 0035   Sun Nov 03, 2024 2152 X-Ray Chest AP Portable  Independent interpretation without acute cardiopulmonary process. [AC]   2313 BNP(!): 135 [AC]   2313 Hemoglobin(!): 12.7 [AC]   2346 CT Head Without Contrast  Independent interpretation Hyperdensity observed on left which was seen previously on CT scan 10/13 that was previously described as venous anomaly in the left basal ganglia with associated calcifications. [AC]   2348 CT Maxillofacial Without Contrast  Independent interpretation without obvious sign of fracture [AC]      ED Course User Index  [AC] Kathleen Helton MD                             Clinical Impression:  Final diagnoses:  [W19.XXXA] Fall  [S09.90XA] Injury of head, initial encounter (Primary)  [S06.0XAA] Concussion with unknown loss of consciousness status, initial encounter                 Kathleen Helton MD  Resident  11/04/24 0035

## 2024-11-04 NOTE — PROVIDER PROGRESS NOTES - EMERGENCY DEPT.
Encounter Date: 11/3/2024    ED Physician Progress Notes        Physician Note:   Signout Note  I received signout from the previous providers, Dr. Helton and Dr. Campos.      Chief complaint: Fall      Per sign out and chart review:  Romero Bowens Jr. is a 64 y.o. male, , presenting with complaints of  fall.      Patient reports tripping and falling on his face approximately 3 days ago.  States that his pant leg got caught on a book shelf and resulted in his fall.  Says that he was face hard and may have had loss of consciousness.  States that he had immediate pain in his face and head.  He also complains of blurry vision that has progressively worsened.  States that in the past day or so he has been noticing some dizziness, lightheadedness, and instability when walking.  Today started to feel nauseated and short of breath.  He continues to endorse headache that only slightly improves with Tylenol.  States that he ran out of Tylenol and took a Goody powder today.  Denies any numbness or weakness or incontinence.  Endorses some dental pain that has been improving.  Does not think that he has any loose teeth.  Denies any chest pain.     During ED stay patient received:  Medications   acetaminophen tablet 1,000 mg (1,000 mg Oral Given 11/3/24 0836)   ibuprofen tablet 600 mg (600 mg Oral Given 11/4/24 0128)        Pt signed out to me pending: CT scan      Update/ Disposition:  CT scan showing no acute hemorrhage, cervical fracture.  CT scan did show that there is significant calcification of the carotid arteries and recommended that patient follows up outpatient ultrasound.  Neurology referral placed for incidental findings of basal ganglia calcification on CT scan.  Questions answered.  Return precautions given.       Diagnostic Impression:    Fall  Facial trauma

## 2024-11-05 LAB
OHS QRS DURATION: 76 MS
OHS QTC CALCULATION: 420 MS

## 2024-11-08 ENCOUNTER — OFFICE VISIT (OUTPATIENT)
Dept: CARDIOLOGY | Facility: CLINIC | Age: 64
End: 2024-11-08
Payer: MEDICARE

## 2024-11-08 VITALS
OXYGEN SATURATION: 96 % | DIASTOLIC BLOOD PRESSURE: 74 MMHG | BODY MASS INDEX: 22.43 KG/M2 | WEIGHT: 151.44 LBS | HEIGHT: 69 IN | SYSTOLIC BLOOD PRESSURE: 160 MMHG | HEART RATE: 73 BPM

## 2024-11-08 DIAGNOSIS — R79.89 ELEVATED BRAIN NATRIURETIC PEPTIDE (BNP) LEVEL: ICD-10-CM

## 2024-11-08 DIAGNOSIS — E78.5 HYPERLIPIDEMIA, UNSPECIFIED HYPERLIPIDEMIA TYPE: ICD-10-CM

## 2024-11-08 DIAGNOSIS — I10 ESSENTIAL HYPERTENSION: ICD-10-CM

## 2024-11-08 DIAGNOSIS — E78.49 OTHER HYPERLIPIDEMIA: Primary | ICD-10-CM

## 2024-11-08 DIAGNOSIS — Z79.4 TYPE 2 DIABETES MELLITUS WITHOUT COMPLICATION, WITH LONG-TERM CURRENT USE OF INSULIN: ICD-10-CM

## 2024-11-08 DIAGNOSIS — R07.9 CHEST PAIN, UNSPECIFIED TYPE: ICD-10-CM

## 2024-11-08 DIAGNOSIS — I73.9 PERIPHERAL ARTERIAL DISEASE: ICD-10-CM

## 2024-11-08 DIAGNOSIS — I10 BENIGN ESSENTIAL HYPERTENSION: ICD-10-CM

## 2024-11-08 DIAGNOSIS — R07.9 CHEST PAIN, UNSPECIFIED TYPE: Primary | ICD-10-CM

## 2024-11-08 DIAGNOSIS — E11.9 TYPE 2 DIABETES MELLITUS WITHOUT COMPLICATION, WITH LONG-TERM CURRENT USE OF INSULIN: ICD-10-CM

## 2024-11-08 PROCEDURE — 99204 OFFICE O/P NEW MOD 45 MIN: CPT | Mod: S$GLB,,, | Performed by: INTERNAL MEDICINE

## 2024-11-08 PROCEDURE — 1159F MED LIST DOCD IN RCRD: CPT | Mod: CPTII,S$GLB,, | Performed by: INTERNAL MEDICINE

## 2024-11-08 PROCEDURE — 4010F ACE/ARB THERAPY RXD/TAKEN: CPT | Mod: CPTII,S$GLB,, | Performed by: INTERNAL MEDICINE

## 2024-11-08 PROCEDURE — 3008F BODY MASS INDEX DOCD: CPT | Mod: CPTII,S$GLB,, | Performed by: INTERNAL MEDICINE

## 2024-11-08 PROCEDURE — 3078F DIAST BP <80 MM HG: CPT | Mod: CPTII,S$GLB,, | Performed by: INTERNAL MEDICINE

## 2024-11-08 PROCEDURE — 3077F SYST BP >= 140 MM HG: CPT | Mod: CPTII,S$GLB,, | Performed by: INTERNAL MEDICINE

## 2024-11-08 PROCEDURE — 1126F AMNT PAIN NOTED NONE PRSNT: CPT | Mod: CPTII,S$GLB,, | Performed by: INTERNAL MEDICINE

## 2024-11-08 PROCEDURE — 1101F PT FALLS ASSESS-DOCD LE1/YR: CPT | Mod: CPTII,S$GLB,, | Performed by: INTERNAL MEDICINE

## 2024-11-08 PROCEDURE — 99999 PR PBB SHADOW E&M-EST. PATIENT-LVL IV: CPT | Mod: PBBFAC,,, | Performed by: INTERNAL MEDICINE

## 2024-11-08 PROCEDURE — 3288F FALL RISK ASSESSMENT DOCD: CPT | Mod: CPTII,S$GLB,, | Performed by: INTERNAL MEDICINE

## 2024-11-08 NOTE — PROGRESS NOTES
"  Cardiology Clinic Visit    History of Present Illness:       Romero Bowens Jr. is a pleasant 64 y.o. male with PMH noted below in assessment/plan fu for fall at [facility] Emergency room. When he had episode, tripped, had mechanical fall. No Syncope. Denies Chest Discomfort/MAGALLON/SOB/Palpitation/Syncope. Never cigs but does smoke MJ occasional.    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 Has 11 kids.   Didn't take BP meds at home      History obtained by patient interview and supplemented by nursing documentation and chart review.   Objective   PMHx:  has a past medical history of Cervicalgia, Diabetes mellitus, History of hepatitis C, s/p successful Rx w/ cure (SVR12 - 9/2020), Hypercholesteremia, Hypertension, Jaw pain, Lipoma of back, Lumbago, Premature beats, unspecified, Sciatica, Scoliosis (and kyphoscoliosis), idiopathic, and Undiagnosed cardiac murmurs.   SurgHx:  has a past surgical history that includes Colonoscopy (N/A, 7/14/2020).   FamHx: family history is not on file.   SocialHx:  reports that he has been smoking cigarettes. He has never used smokeless tobacco. He reports that he does not currently use drugs after having used the following drugs: Marijuana. He reports that he does not drink alcohol.  Home Meds:  Current Outpatient Medications   Medication Instructions    amLODIPine (NORVASC) 10 mg, Oral, Daily    benazepriL (LOTENSIN) 20 mg, Oral, Daily    erythromycin (ROMYCIN) ophthalmic ointment erythromycin 5 mg/gram (0.5 %) eye ointment   APPLY OINTMENT ONTO EACH EYE AT BEDTIME    insulin glargine U-100 (Lantus) (LANTUS SOLOSTAR U-100 INSULIN) 15 Units, Daily    lovastatin (MEVACOR) 10 MG tablet No dose, route, or frequency recorded.    meclizine (ANTIVERT) 25 mg tablet meclizine 25 mg tablet    meloxicam (MOBIC) 15 mg, Oral, Daily    metFORMIN (GLUCOPHAGE) 1,000 mg, Oral, 2 times daily     Objective/Exam:   BP (!) 160/74 (BP Location: Right arm)   Pulse 73   Ht 5' 9" " "(1.753 m)   Wt 68.7 kg (151 lb 7.3 oz)   SpO2 96%   BMI 22.37 kg/m²    Wt Readings from Last 4 Encounters:   11/08/24 68.7 kg (151 lb 7.3 oz)   11/03/24 66.7 kg (147 lb)   10/13/24 66.7 kg (147 lb)   09/25/24 66.7 kg (147 lb)      Constitutional: NAD, Atraumatic, Conversant   HEENT: MMM, Sclera anicteric, No JVD   Cardiovascular: RRR, no murmurs noted, no chest tenderness to palpation, 2+ radial pulses b/l  Pulmonary: normal respiratory effort, CTAB, no crackles, wheezes  Abdominal: S/NT/ND  Musculoskeletal: No lower extremity edema noted b/l  Neurological: No gross neurological deficits  Skin: W/D/I  Psych: Appropriate affect, normal mood  Labs/Imaging/Procedures   Personally reviewed  Lab Results   Component Value Date     11/03/2024    K 4.6 11/03/2024     11/03/2024    CO2 28 11/03/2024    BUN 11 11/03/2024    CREATININE 1.2 11/03/2024    GLUCOSE 55 (L) 08/05/2010    ANIONGAP 8 11/03/2024     Lab Results   Component Value Date    HGBA1C 9.3 (H) 06/01/2021     Lab Results   Component Value Date     (H) 11/03/2024     (H) 10/13/2024     (H) 09/25/2024      Lab Results   Component Value Date    WBC 6.73 11/03/2024    HGB 12.7 (L) 11/03/2024    HCT 39.6 (L) 11/03/2024    HCT 45 10/13/2024     11/03/2024    GRAN 3.6 11/03/2024    GRAN 53.5 11/03/2024     Lab Results   Component Value Date    CHOL 210 (H) 08/15/2012    HDL 49 08/15/2012    LDLCALC 139.0 08/15/2012    TRIG 110 08/15/2012     Lab Results   Component Value Date    TSH 0.649 06/01/2021     No results found for: "APOLIPOPROTE"  No results found for: "LIPOA"     TTE:  No results found for this or any previous visit.    Stress Testing:   No results found for this or any previous visit.     Coronary Angiogram:  No results found for this or any previous visit.      Personal: Retired [employer], [employer]. 11 kids, 11 grandkids     46 minutes were spent on this visit including time personally:  -Reviewing Medical " records & lab results  -Independently reviewing and interpreting (if not documented by myself) EKGs, echocardiograms, catherizations   -Obtaining a history, performing a relevant exam, counseling/educating the patient/family  -Documenting clinical information in the EMR including ordering of tests  -Care coordination and communicating with other health care providers       Problem List:     1. Other hyperlipidemia    2. Chest pain, unspecified type    3. Elevated brain natriuretic peptide (BNP) level    4. Benign essential hypertension    5. Hyperlipidemia, unspecified hyperlipidemia type    6. Essential hypertension    7. Type 2 diabetes mellitus without complication, with long-term current use of insulin    8. Peripheral arterial disease      Assessment/Plan:   Romero Bowens Jr. is a 64 y.o. male with a PMHx of IDDMII, HTN, PAD (mild on US 2021, <50% on CTA runoff) referred for f/u of mechanical fall.     -No syncopal episodes   -BP uncontrolled but didn't take his meds this AM  -Keep BP log  -A1c very elevated, will discsus with his PCP re other agents  -LDL check next visit as uncontrolled  -Continue amlo 10 and benzapril 40 for now   -cont lovastatin for now--likely needs higher intensity   -TTE  -PAD asx today    Thank you for the opportunity to care for this patient. Please don't hesitate to reach out with any questions/concerns        Zoie Davis MD  Interventional Cardiology  [facility]

## 2024-12-11 DIAGNOSIS — R09.89 BRUIT: Primary | ICD-10-CM

## 2025-01-27 ENCOUNTER — HOSPITAL ENCOUNTER (OUTPATIENT)
Dept: RADIOLOGY | Facility: HOSPITAL | Age: 65
Discharge: HOME | End: 2025-01-27
Attending: INTERNAL MEDICINE
Payer: MEDICARE

## 2025-01-27 DIAGNOSIS — R09.89 BRUIT: ICD-10-CM

## 2025-01-27 PROCEDURE — 93880 EXTRACRANIAL BILAT STUDY: CPT | Mod: TC

## 2025-01-27 PROCEDURE — 93880 EXTRACRANIAL BILAT STUDY: CPT | Mod: 26,,, | Performed by: RADIOLOGY

## 2025-07-31 ENCOUNTER — HOSPITAL ENCOUNTER (OUTPATIENT)
Facility: HOSPITAL | Age: 65
Discharge: HOME | End: 2025-08-01
Attending: EMERGENCY MEDICINE | Admitting: EMERGENCY MEDICINE
Payer: MEDICARE

## 2025-07-31 DIAGNOSIS — E83.51 HYPOCALCEMIA: ICD-10-CM

## 2025-07-31 DIAGNOSIS — I35.0 AORTIC VALVE STENOSIS, ETIOLOGY OF CARDIAC VALVE DISEASE UNSPECIFIED: ICD-10-CM

## 2025-07-31 DIAGNOSIS — Z72.0 TOBACCO USE: ICD-10-CM

## 2025-07-31 DIAGNOSIS — I10 HYPERTENSION, UNSPECIFIED TYPE: ICD-10-CM

## 2025-07-31 DIAGNOSIS — N17.9 AKI (ACUTE KIDNEY INJURY): ICD-10-CM

## 2025-07-31 DIAGNOSIS — R55 SYNCOPE: Primary | ICD-10-CM

## 2025-07-31 LAB
ABSOLUTE EOSINOPHIL (OHS): 0.08 K/UL
ABSOLUTE MONOCYTE (OHS): 0.87 K/UL (ref 0.3–1)
ABSOLUTE NEUTROPHIL COUNT (OHS): 4.79 K/UL (ref 1.8–7.7)
ALBUMIN SERPL BCP-MCNC: 3.4 G/DL (ref 3.5–5.2)
ALP SERPL-CCNC: 65 UNIT/L (ref 40–150)
ALT SERPL W/O P-5'-P-CCNC: 15 UNIT/L (ref 0–55)
ANION GAP (OHS): 8 MMOL/L (ref 8–16)
AST SERPL-CCNC: 20 UNIT/L (ref 0–50)
BASOPHILS # BLD AUTO: 0.04 K/UL
BASOPHILS NFR BLD AUTO: 0.5 %
BILIRUB SERPL-MCNC: 0.3 MG/DL (ref 0.1–1)
BUN SERPL-MCNC: 21 MG/DL (ref 8–23)
CALCIUM SERPL-MCNC: 8.4 MG/DL (ref 8.7–10.5)
CHLORIDE SERPL-SCNC: 106 MMOL/L (ref 95–110)
CO2 SERPL-SCNC: 25 MMOL/L (ref 23–29)
CREAT SERPL-MCNC: 1.9 MG/DL (ref 0.5–1.4)
ERYTHROCYTE [DISTWIDTH] IN BLOOD BY AUTOMATED COUNT: 14 % (ref 11.5–14.5)
GFR SERPLBLD CREATININE-BSD FMLA CKD-EPI: 38 ML/MIN/1.73/M2
GLUCOSE SERPL-MCNC: 86 MG/DL (ref 70–110)
HCT VFR BLD AUTO: 37.3 % (ref 40–54)
HGB BLD-MCNC: 12.3 GM/DL (ref 14–18)
IMM GRANULOCYTES # BLD AUTO: 0.02 K/UL (ref 0–0.04)
IMM GRANULOCYTES NFR BLD AUTO: 0.3 % (ref 0–0.5)
LYMPHOCYTES # BLD AUTO: 1.87 K/UL (ref 1–4.8)
MCH RBC QN AUTO: 28.8 PG (ref 27–31)
MCHC RBC AUTO-ENTMCNC: 33 G/DL (ref 32–36)
MCV RBC AUTO: 87 FL (ref 82–98)
NUCLEATED RBC (/100WBC) (OHS): 0 /100 WBC
PLATELET # BLD AUTO: 188 K/UL (ref 150–450)
PMV BLD AUTO: 11.6 FL (ref 9.2–12.9)
POTASSIUM SERPL-SCNC: 3.7 MMOL/L (ref 3.5–5.1)
PROT SERPL-MCNC: 6.3 GM/DL (ref 6–8.4)
RBC # BLD AUTO: 4.27 M/UL (ref 4.6–6.2)
RELATIVE EOSINOPHIL (OHS): 1 %
RELATIVE LYMPHOCYTE (OHS): 24.4 % (ref 18–48)
RELATIVE MONOCYTE (OHS): 11.3 % (ref 4–15)
RELATIVE NEUTROPHIL (OHS): 62.5 % (ref 38–73)
SODIUM SERPL-SCNC: 139 MMOL/L (ref 136–145)
WBC # BLD AUTO: 7.67 K/UL (ref 3.9–12.7)

## 2025-07-31 PROCEDURE — G0378 HOSPITAL OBSERVATION PER HR: HCPCS

## 2025-07-31 PROCEDURE — 93005 ELECTROCARDIOGRAM TRACING: CPT

## 2025-07-31 PROCEDURE — 63600175 PHARM REV CODE 636 W HCPCS: Performed by: EMERGENCY MEDICINE

## 2025-07-31 PROCEDURE — 99285 EMERGENCY DEPT VISIT HI MDM: CPT | Mod: 25

## 2025-07-31 PROCEDURE — 96360 HYDRATION IV INFUSION INIT: CPT

## 2025-07-31 PROCEDURE — 83036 HEMOGLOBIN GLYCOSYLATED A1C: CPT | Performed by: EMERGENCY MEDICINE

## 2025-07-31 PROCEDURE — 85025 COMPLETE CBC W/AUTO DIFF WBC: CPT | Performed by: EMERGENCY MEDICINE

## 2025-07-31 PROCEDURE — 96361 HYDRATE IV INFUSION ADD-ON: CPT

## 2025-07-31 PROCEDURE — 93010 ELECTROCARDIOGRAM REPORT: CPT | Mod: ,,, | Performed by: STUDENT IN AN ORGANIZED HEALTH CARE EDUCATION/TRAINING PROGRAM

## 2025-07-31 PROCEDURE — 80053 COMPREHEN METABOLIC PANEL: CPT | Performed by: EMERGENCY MEDICINE

## 2025-07-31 PROCEDURE — 25000003 PHARM REV CODE 250: Performed by: EMERGENCY MEDICINE

## 2025-07-31 RX ORDER — IBUPROFEN 200 MG
16 TABLET ORAL
Status: DISCONTINUED | OUTPATIENT
Start: 2025-08-01 | End: 2025-08-01 | Stop reason: HOSPADM

## 2025-07-31 RX ORDER — AMLODIPINE BESYLATE 10 MG/1
10 TABLET ORAL DAILY
Status: DISCONTINUED | OUTPATIENT
Start: 2025-08-01 | End: 2025-08-01 | Stop reason: HOSPADM

## 2025-07-31 RX ORDER — INSULIN ASPART 100 [IU]/ML
0-5 INJECTION, SOLUTION INTRAVENOUS; SUBCUTANEOUS
Status: DISCONTINUED | OUTPATIENT
Start: 2025-08-01 | End: 2025-08-01 | Stop reason: HOSPADM

## 2025-07-31 RX ORDER — SODIUM CHLORIDE, SODIUM LACTATE, POTASSIUM CHLORIDE, CALCIUM CHLORIDE 600; 310; 30; 20 MG/100ML; MG/100ML; MG/100ML; MG/100ML
1000 INJECTION, SOLUTION INTRAVENOUS
Status: COMPLETED | OUTPATIENT
Start: 2025-07-31 | End: 2025-08-01

## 2025-07-31 RX ORDER — ACETAMINOPHEN 500 MG
1000 TABLET ORAL
Status: COMPLETED | OUTPATIENT
Start: 2025-07-31 | End: 2025-07-31

## 2025-07-31 RX ORDER — IBUPROFEN 200 MG
24 TABLET ORAL
Status: DISCONTINUED | OUTPATIENT
Start: 2025-08-01 | End: 2025-08-01 | Stop reason: HOSPADM

## 2025-07-31 RX ORDER — ONDANSETRON 4 MG/1
4 TABLET, ORALLY DISINTEGRATING ORAL EVERY 6 HOURS PRN
Status: DISCONTINUED | OUTPATIENT
Start: 2025-08-01 | End: 2025-08-01 | Stop reason: HOSPADM

## 2025-07-31 RX ORDER — GLUCAGON 1 MG
1 KIT INJECTION
Status: DISCONTINUED | OUTPATIENT
Start: 2025-08-01 | End: 2025-08-01 | Stop reason: HOSPADM

## 2025-07-31 RX ORDER — SODIUM CHLORIDE 0.9 % (FLUSH) 0.9 %
10 SYRINGE (ML) INJECTION
Status: DISCONTINUED | OUTPATIENT
Start: 2025-08-01 | End: 2025-08-01 | Stop reason: HOSPADM

## 2025-07-31 RX ORDER — ACETAMINOPHEN 325 MG/1
650 TABLET ORAL EVERY 6 HOURS PRN
Status: DISCONTINUED | OUTPATIENT
Start: 2025-08-01 | End: 2025-08-01 | Stop reason: HOSPADM

## 2025-07-31 RX ADMIN — SODIUM CHLORIDE, POTASSIUM CHLORIDE, SODIUM LACTATE AND CALCIUM CHLORIDE 1000 ML: 600; 310; 30; 20 INJECTION, SOLUTION INTRAVENOUS at 09:07

## 2025-07-31 RX ADMIN — ACETAMINOPHEN 1000 MG: 500 TABLET ORAL at 09:07

## 2025-07-31 NOTE — Clinical Note
Diagnosis: Syncope and collapse [780.2.ICD-9-CM]   Future Attending Provider: ERASMO MARTINS [[id number]]   Is the patient being sent to ED Observation?: Yes

## 2025-08-01 VITALS
TEMPERATURE: 99 F | RESPIRATION RATE: 20 BRPM | WEIGHT: 151 LBS | SYSTOLIC BLOOD PRESSURE: 136 MMHG | HEIGHT: 69 IN | HEART RATE: 60 BPM | BODY MASS INDEX: 22.36 KG/M2 | OXYGEN SATURATION: 97 % | DIASTOLIC BLOOD PRESSURE: 74 MMHG

## 2025-08-01 LAB
ABSOLUTE EOSINOPHIL (OHS): 0.11 K/UL
ABSOLUTE MONOCYTE (OHS): 0.59 K/UL (ref 0.3–1)
ABSOLUTE NEUTROPHIL COUNT (OHS): 3.15 K/UL (ref 1.8–7.7)
ANION GAP (OHS): 7 MMOL/L (ref 8–16)
AORTIC SIZE INDEX (SOV): 1.4 CM/M2
AV AREA BY CONTINUOUS VTI: 2.5 CM2
AV INDEX (PROSTH): 0.82
AV LVOT MEAN GRADIENT: 2 MMHG
AV LVOT PEAK GRADIENT: 4 MMHG
AV MEAN GRADIENT: 4 MMHG
AV PEAK GRADIENT: 7 MMHG
AV VALVE AREA BY VELOCITY RATIO: 2.4 CM²
AV VALVE AREA: 2.6 CM2
AV VELOCITY RATIO: 0.77
BASOPHILS # BLD AUTO: 0.04 K/UL
BASOPHILS NFR BLD AUTO: 0.6 %
BSA FOR ECHO PROCEDURE: 1.83 M2
BUN SERPL-MCNC: 19 MG/DL (ref 6–30)
BUN SERPL-MCNC: 23 MG/DL (ref 8–23)
CALCIUM SERPL-MCNC: 8.4 MG/DL (ref 8.7–10.5)
CHLORIDE SERPL-SCNC: 100 MMOL/L (ref 95–110)
CHLORIDE SERPL-SCNC: 105 MMOL/L (ref 95–110)
CO2 SERPL-SCNC: 26 MMOL/L (ref 23–29)
CREAT SERPL-MCNC: 1.4 MG/DL (ref 0.5–1.4)
CREAT SERPL-MCNC: 1.6 MG/DL (ref 0.5–1.4)
CV ECHO LV RWT: 0.63 CM
DOP CALC AO PEAK VEL: 1.3 M/S
DOP CALC AO VTI: 28.8 CM
DOP CALC LVOT AREA: 3.1 CM2
DOP CALC LVOT DIAMETER: 2 CM
DOP CALC LVOT PEAK VEL: 1 M/S
DOP CALCLVOT PEAK VEL VTI: 23.6 CM
E WAVE DECELERATION TIME: 276 MS
E WAVE DECELERATION TIME: 279 MS
E/A RATIO: 0.94
E/E' RATIO: 11 M/S
EAG (OHS): 137 MG/DL (ref 68–131)
ECHO EF ESTIMATED: 56 %
ECHO LV POSTERIOR WALL: 1.1 CM (ref 0.6–1.1)
ERYTHROCYTE [DISTWIDTH] IN BLOOD BY AUTOMATED COUNT: 14 % (ref 11.5–14.5)
FRACTIONAL SHORTENING: 28.6 % (ref 28–44)
GFR SERPLBLD CREATININE-BSD FMLA CKD-EPI: 47 ML/MIN/1.73/M2
GLUCOSE SERPL-MCNC: 106 MG/DL (ref 70–110)
GLUCOSE SERPL-MCNC: 82 MG/DL (ref 70–110)
HBA1C MFR BLD: 6.4 % (ref 4–5.6)
HCT VFR BLD AUTO: 38.9 % (ref 40–54)
HCT VFR BLD CALC: 44 %PCV (ref 36–54)
HGB BLD-MCNC: 12.6 GM/DL (ref 14–18)
IMM GRANULOCYTES # BLD AUTO: 0.01 K/UL (ref 0–0.04)
IMM GRANULOCYTES NFR BLD AUTO: 0.1 % (ref 0–0.5)
INTERVENTRICULAR SEPTUM: 1.2 CM (ref 0.6–1.1)
LA MAJOR: 5.9 CM
LA MINOR: 4.8 CM
LA WIDTH: 3.5 CM
LEFT ATRIUM SIZE: 3 CM
LEFT ATRIUM VOLUME INDEX: 26 ML/M2
LEFT ATRIUM VOLUME: 47 CM3
LEFT INTERNAL DIMENSION IN SYSTOLE: 2.5 CM (ref 2.1–4)
LEFT VENTRICLE DIASTOLIC VOLUME INDEX: 26.78 ML/M2
LEFT VENTRICLE DIASTOLIC VOLUME: 49 ML
LEFT VENTRICLE MASS INDEX: 69.5 G/M2
LEFT VENTRICLE SYSTOLIC VOLUME INDEX: 12 ML/M2
LEFT VENTRICLE SYSTOLIC VOLUME: 22 ML
LEFT VENTRICULAR INTERNAL DIMENSION IN DIASTOLE: 3.5 CM (ref 3.5–6)
LEFT VENTRICULAR MASS: 127.3 G
LV LATERAL E/E' RATIO: 10.6
LV SEPTAL E/E' RATIO: 10.6
LYMPHOCYTES # BLD AUTO: 2.95 K/UL (ref 1–4.8)
Lab: 1.5 CM/M
MCH RBC QN AUTO: 28.5 PG (ref 27–31)
MCHC RBC AUTO-ENTMCNC: 32.4 G/DL (ref 32–36)
MCV RBC AUTO: 88 FL (ref 82–98)
MV PEAK A VEL: 0.79 M/S
MV PEAK E VEL: 0.74 M/S
NUCLEATED RBC (/100WBC) (OHS): 0 /100 WBC
OHS CV CPX PATIENT HEIGHT IN: 69
OHS CV RV/LV RATIO: 0.83 CM
OHS QRS DURATION: 88 MS
OHS QTC CALCULATION: 447 MS
PLATELET # BLD AUTO: 170 K/UL (ref 150–450)
PMV BLD AUTO: 11.9 FL (ref 9.2–12.9)
POC IONIZED CALCIUM: 1.14 MMOL/L (ref 1.06–1.42)
POC TCO2 (MEASURED): 29 MMOL/L (ref 23–29)
POCT GLUCOSE: 58 MG/DL (ref 70–110)
POCT GLUCOSE: 86 MG/DL (ref 70–110)
POTASSIUM BLD-SCNC: 4.3 MMOL/L (ref 3.5–5.1)
POTASSIUM SERPL-SCNC: 4 MMOL/L (ref 3.5–5.1)
RA MAJOR: 4.47 CM
RA PRESSURE ESTIMATED: 3 MMHG
RA WIDTH: 3.18 CM
RBC # BLD AUTO: 4.42 M/UL (ref 4.6–6.2)
RELATIVE EOSINOPHIL (OHS): 1.6 %
RELATIVE LYMPHOCYTE (OHS): 43.1 % (ref 18–48)
RELATIVE MONOCYTE (OHS): 8.6 % (ref 4–15)
RELATIVE NEUTROPHIL (OHS): 46 % (ref 38–73)
RIGHT VENTRICLE DIASTOLIC BASEL DIMENSION: 2.9 CM
SAMPLE: NORMAL
SINUS: 2.6 CM
SODIUM BLD-SCNC: 139 MMOL/L (ref 136–145)
SODIUM SERPL-SCNC: 138 MMOL/L (ref 136–145)
STJ: 2.2 CM
TDI LATERAL: 0.07 M/S
TDI SEPTAL: 0.07 M/S
TDI: 0.07 M/S
TRICUSPID ANNULAR PLANE SYSTOLIC EXCURSION: 2.1 CM
WBC # BLD AUTO: 6.85 K/UL (ref 3.9–12.7)
Z-SCORE OF LEFT VENTRICULAR DIMENSION IN END DIASTOLE: -3.71
Z-SCORE OF LEFT VENTRICULAR DIMENSION IN END SYSTOLE: -1.78

## 2025-08-01 PROCEDURE — 25000003 PHARM REV CODE 250: Performed by: PHYSICIAN ASSISTANT

## 2025-08-01 PROCEDURE — 82962 GLUCOSE BLOOD TEST: CPT

## 2025-08-01 PROCEDURE — G0378 HOSPITAL OBSERVATION PER HR: HCPCS

## 2025-08-01 PROCEDURE — 82310 ASSAY OF CALCIUM: CPT | Performed by: PHYSICIAN ASSISTANT

## 2025-08-01 PROCEDURE — 63600175 PHARM REV CODE 636 W HCPCS: Performed by: PHYSICIAN ASSISTANT

## 2025-08-01 PROCEDURE — 85025 COMPLETE CBC W/AUTO DIFF WBC: CPT | Performed by: PHYSICIAN ASSISTANT

## 2025-08-01 PROCEDURE — 96361 HYDRATE IV INFUSION ADD-ON: CPT

## 2025-08-01 RX ORDER — CALCIUM CARBONATE 200(500)MG
500 TABLET,CHEWABLE ORAL
Status: COMPLETED | OUTPATIENT
Start: 2025-08-01 | End: 2025-08-01

## 2025-08-01 RX ADMIN — AMLODIPINE BESYLATE 10 MG: 10 TABLET ORAL at 08:08

## 2025-08-01 RX ADMIN — Medication 16 G: at 08:08

## 2025-08-01 RX ADMIN — SODIUM CHLORIDE, POTASSIUM CHLORIDE, SODIUM LACTATE AND CALCIUM CHLORIDE 1000 ML: 600; 310; 30; 20 INJECTION, SOLUTION INTRAVENOUS at 12:08

## 2025-08-01 RX ADMIN — CALCIUM CARBONATE (ANTACID) CHEW TAB 500 MG 500 MG: 500 CHEW TAB at 08:08

## 2025-08-01 NOTE — ED NOTES
Report received from Gerry SZYMANSKI RN. Assume care of pt. Pt resting comfortably and independently repositioned in stretcher with bed locked in lowest position for safety. NAD noted at this time. Respirations even and unlabored and visible chest rise noted.  Patient offered bathroom assistance and denies need at this time. Pt instructed to call if assistance is needed. Pt on continuous cardiac. Call light within reach. No needs at this time. Will continue to monitor.

## 2025-08-01 NOTE — ED NOTES
Cbg of 58, holding sliding scale insulin, will give patient meal and glucose tablet, and recheck sugar in an hour.

## 2025-08-01 NOTE — PLAN OF CARE
[facility] - Emergency Dept  Initial Discharge Assessment       Primary Care Provider: No, Primary Doctor    Admission Diagnosis: Syncope and collapse [R55]    Admission Date: 7/31/2025    Pt is independent with their ADL's and ambulation, does not require assistance. Post acute was discussed with pt. Pt d/c home with no needs at the moment. Pt family/friend to provide transportation home when medically ready.    Pt stated his PCP is with [facility].  Expected Discharge Date:     Transition of Care Barriers: (P) None    Payor: [employer]epaa MGD [facility] / Plan: [employer] / Product Type: Medicare Advantage /     Extended Emergency Contact Information  Primary Emergency Contact: Codi Bowens  Address: [address] [place] [address]  Home Phone: [phone]  Mobile Phone: [phone]  Relation: Sister  Secondary Emergency Contact: Brielle Massey  Mobile Phone: [phone]  Relation: Daughter    Discharge Plan A: (P) Home, Home with family  Discharge Plan B: (P) Home with family, Home      [id number] - [place] - [address]  [address] [facility] [address]  Phone: [phone] Fax: [phone]    [id number] [place] [facility] [address] [facility]  [address] [facility]  [facility] [place] [address]  Phone: [phone] Fax: [phone]      Initial Assessment (most recent)       Adult Discharge Assessment - 08/01/25 1004          Discharge Assessment    Assessment Type Discharge Planning Assessment (P)      Confirmed/corrected address, phone number and insurance Yes (P)      Confirmed Demographics Correct on Facesheet (P)      Source of Information patient (P)      People in Home child(martina), adult (P)      Do you expect to return to your current living situation? Yes (P)      Do you have help at home or someone to help you manage your care at home? No (P)      Prior to hospitilization cognitive status: Alert/Oriented (P)      Current  cognitive status: Alert/Oriented (P)      Walking or Climbing Stairs Difficulty no (P)      Dressing/Bathing Difficulty no (P)      Home Accessibility stairs to enter home (P)      Number of Stairs, Main Entrance three (P)      Home Layout Able to live on 1st floor (P)      Equipment Currently Used at Home none (P)      Readmission within 30 days? No (P)      Patient currently being followed by outpatient case management? No (P)      Do you currently have service(s) that help you manage your care at home? No (P)      Do you take prescription medications? Yes (P)      Do you have prescription coverage? Yes (P)      Do you have any problems affording any of your prescribed medications? No (P)      Is the patient taking medications as prescribed? yes (P)      Who is going to help you get home at discharge? family/friend (P)      How do you get to doctors appointments? car, drives self;health plan transportation (P)      Are you on dialysis? No (P)      Do you take coumadin? No (P)      Discharge Plan A Home;Home with family (P)      Discharge Plan B Home with family;Home (P)      DME Needed Upon Discharge  none (P)      Discharge Plan discussed with: Patient (P)      Transition of Care Barriers None (P)         Physical Activity    On average, how many days per week do you engage in moderate to strenuous exercise (like a brisk walk)? 2 days (P)      On average, how many minutes do you engage in exercise at this level? 40 min (P)         Financial Resource Strain    How hard is it for you to pay for the very basics like food, housing, medical care, and heating? Not very hard (P)         Housing Stability    In the last 12 months, was there a time when you were not able to pay the mortgage or rent on time? No (P)      At any time in the past 12 months, were you homeless or living in a shelter (including now)? No (P)         Transportation Needs    In the past 12 months, has lack of transportation kept you from medical  appointments or from getting medications? No (P)      In the past 12 months, has lack of transportation kept you from meetings, work, or from getting things needed for daily living? No (P)         Food Insecurity    Within the past 12 months, you worried that your food would run out before you got the money to buy more. Never true (P)      Within the past 12 months, the food you bought just didn't last and you didn't have money to get more. Never true (P)         Stress    Do you feel stress - tense, restless, nervous, or anxious, or unable to sleep at night because your mind is troubled all the time - these days? Only a little (P)         Social Isolation    How often do you feel lonely or isolated from those around you?  Never (P)         Alcohol Use    Q1: How often do you have a drink containing alcohol? Never (P)      Q2: How many drinks containing alcohol do you have on a typical day when you are drinking? Patient does not drink (P)      Q3: How often do you have six or more drinks on one occasion? Never (P)         Utilities    In the past 12 months has the electric, gas, oil, or water company threatened to shut off services in your home? No (P)         Health Literacy    How often do you need to have someone help you when you read instructions, pamphlets, or other written material from your doctor or pharmacy? Never (P)                    DANIELLE Casey, CSW.   Case Management  [facility]  Email: [email or web address]

## 2025-08-01 NOTE — H&P
"ED Observation Unit  History and Physical      I assumed care of this patient from the Main ED at onset of observation time, 10:40pm on 07/31/2025.       History of Present Illness:    Romero Bowens Jr. is a 65 y.o. male with medical history of HTN, HLD, DM presenting to the ED with the chief complaint of syncope.     Per ED: "brought in by EMS after an episode of syncope. Patient states for the past couple of days he has not been eating or drinking as much, he is not entirely sure why. He denies any URI symptoms so if fevers chills, chest pain, shortness of breath or nausea vomiting diarrhea. Tonight he was in his kitchen and he started to feel dizzy and lightheaded and many says he woke up on the ground. He was told that he did hit his head. He says that he does not have any pain in his extremities, chest wall or hips. Patient is not on any anticoagulant or antiplatelet agents."     Per EDOU: Son heard him fall in the kitchen and came to his aid. Felt lightheaded and dizzy prior to the syncopal episode. No reported seizure-like activity. Reports he does not drink very much water or any liquids throughout the day. No ETOH use. +Marijuana use. No chest pain, back pain, abdominal pain.     In the ED, H/H 12/3 baseline, Crt 1.9 (baseline 1.2). CT head and c-spine with no traumatic injuries. ECG sinus mallika 59 bpm. He received 1L LR IV and Tylenol 1g PO.     I reviewed the ED Provider Note dated 7/31/25 prior to my evaluation of this patient.  I reviewed all labs and imaging performed in the Main ED, prior to patient being placed in Observation. Patient was placed in the ED Observation Unit for Syncope.    PMHx   Past Medical History:   Diagnosis Date    Cervicalgia     Diabetes mellitus     History of hepatitis C, s/p successful Rx w/ cure (SVR12 - 9/2020)     Hypercholesteremia     Hypertension     Jaw pain     Lipoma of back     Lumbago     Premature beats, unspecified     Sciatica     Scoliosis (and kyphoscoliosis), " "idiopathic     Undiagnosed cardiac murmurs       Past Surgical History:   Procedure Laterality Date    COLONOSCOPY N/A 7/14/2020    Procedure: COLONOSCOPY;  Surgeon: Magdalena Sales MD;  Location: [facility];  Service: Endoscopy;  Laterality: N/A;        Family Hx   No family history on file.     Social Hx   Social History     Socioeconomic History    Marital status: Single   Tobacco Use    Smoking status: Every Day     Types: Cigarettes    Smokeless tobacco: Never    Tobacco comments:     marijuana   Substance and Sexual Activity    Alcohol use: No    Drug use: Not Currently     Types: Marijuana     Comment: daily    Sexual activity: Not Currently     Social Drivers of Health     Financial Resource Strain: High Risk (12/1/2024)    Received from [facility] SDOH Screening     In the past year, have you been unable to get any of the following when you really needed them? choose all that apply.: Clothing     In the past year, have you been unable to get any of the following when you really needed them? choose all that apply.: Internet   Food Insecurity: High Risk (12/1/2024)    Received from [facility] SDOH Screening     In the past 2 months, did you or others you live with eat smaller meals or skip meals because you didn't have money for food?: Yes   Housing Stability: High Risk (12/1/2024)    Received from [facility] SDOH Screening     What is your living situation today?: I have a place to live today, but i am worried about losing it in the future     In the past year, have you been unable to get any of the following when you really needed them? choose all that apply.: Utilities (electric, gas, and water)        Vital Signs   Vitals:    07/31/25 1919 07/31/25 2200 07/31/25 2300   BP: (!) 101/56 (!) 150/70 122/65   Pulse: 60 (!) 58 (!) 55   Resp: 18     Temp: 98.8 °F (37.1 °C)     TempSrc: Oral     SpO2: 100% 100% 100%   Weight: 68.7 kg (151 lb 7.3 oz)     Height: 5' 9" " (1.753 m)          Review of Systems  Review of Systems   Constitutional:  Negative for fever.       Physical Exam  Physical Exam  Constitutional:       General: He is not in acute distress.     Appearance: He is not diaphoretic.   HENT:      Head: Normocephalic and atraumatic.   Eyes:      Conjunctiva/sclera: Conjunctivae normal.      Pupils: Pupils are equal, round, and reactive to light.   Cardiovascular:      Rate and Rhythm: Normal rate and regular rhythm.      Heart sounds: Normal heart sounds.   Pulmonary:      Effort: Pulmonary effort is normal. No respiratory distress.      Breath sounds: Normal breath sounds. No wheezing or rales.   Abdominal:      General: Bowel sounds are normal. There is no distension.      Palpations: Abdomen is soft.      Tenderness: There is no abdominal tenderness.   Musculoskeletal:         General: No tenderness. Normal range of motion.      Cervical back: Normal range of motion and neck supple.   Skin:     General: Skin is warm and dry.      Findings: No erythema or rash.   Neurological:      Mental Status: He is alert and oriented to person, place, and time.      Cranial Nerves: No cranial nerve deficit.         Medications:   Scheduled Meds:   [START ON 8/1/2025] amLODIPine  10 mg Oral Daily    lactated ringers  1,000 mL Intravenous ED 1 Time     Continuous Infusions:  PRN Meds:.  Current Facility-Administered Medications:     [START ON 8/1/2025] acetaminophen, 650 mg, Oral, Q6H PRN    [START ON 8/1/2025] dextrose 50%, 12.5 g, Intravenous, PRN    [START ON 8/1/2025] dextrose 50%, 25 g, Intravenous, PRN    [START ON 8/1/2025] glucagon (human recombinant), 1 mg, Intramuscular, PRN    [START ON 8/1/2025] glucose, 16 g, Oral, PRN    [START ON 8/1/2025] glucose, 24 g, Oral, PRN    [START ON 8/1/2025] insulin aspart U-100, 0-5 Units, Subcutaneous, QID (AC + HS) PRN    [START ON 8/1/2025] ondansetron, 4 mg, Oral, Q6H PRN    [START ON 8/1/2025] sodium chloride 0.9%, 10 mL, Intravenous,  PRN      Assessment/Plan:  Syncopal episode  FELI  -Syncopal and fall this evening after walking into his kitchen. Felt lightheaded prior to the episode.  -ECG sinus mallika similar to previous  -CT head and c-spine without significant findings  -Crt elevated 1.9. Suspect from poor hydration and likely etiology for his syncopal episode. IVF ordered  -CXR ordered and pending  -Echo ordered and pending  -Telemetry    Case was discussed with the ED provider, Dr. Muro.

## 2025-08-01 NOTE — ED NOTES
I-STAT Chem-8+ Results:   Value Reference Range   Sodium 139 136-145 mmol/L   Potassium  4.6 3.5-5.1 mmol/L   Chloride 100  mmol/L   Ionized Calcium 1.14 1.06-1.42 mmol/L   CO2 (measured) 29 23-29 mmol/L   Glucose 106  mg/dL   BUN 19 6-30 mg/dL   Creatinine 1.4 0.5-1.4 mg/dL   Hematocrit 44 36-54%

## 2025-08-01 NOTE — DISCHARGE SUMMARY
"ED Observation Unit  Discharge Summary        History of Present Illness:    Romero Bowens Jr. is a 65 y.o. male with medical history of HTN, HLD, DM presenting to the ED with the chief complaint of syncope.      Per ED: "brought in by EMS after an episode of syncope. Patient states for the past couple of days he has not been eating or drinking as much, he is not entirely sure why. He denies any URI symptoms so if fevers chills, chest pain, shortness of breath or nausea vomiting diarrhea. Tonight he was in his kitchen and he started to feel dizzy and lightheaded and many says he woke up on the ground. He was told that he did hit his head. He says that he does not have any pain in his extremities, chest wall or hips. Patient is not on any anticoagulant or antiplatelet agents."      Per EDOU: Son heard him fall in the kitchen and came to his aid. Felt lightheaded and dizzy prior to the syncopal episode. No reported seizure-like activity. Reports he does not drink very much water or any liquids throughout the day. No ETOH use. +Marijuana use. No chest pain, back pain, abdominal pain.      In the ED, H/H 12/3 baseline, Crt 1.9 (baseline 1.2). CT head and c-spine with no traumatic injuries. ECG sinus mallika 59 bpm. He received 1L LR IV and Tylenol 1g PO.      I reviewed the ED Provider Note dated 7/31/25 prior to my evaluation of this patient.  I reviewed all labs and imaging performed in the Main ED, prior to patient being placed in Observation. Patient was placed in the ED Observation Unit for Syncope. (copy from H/P)    Observation Course:    - Pt reports feeling much better, had breakfast and lunch, inquiring about discharge   - FELI improved; Creatinine 1.9 --> 1.6 --> 1.4   - Mild/asymptomatic hypoglycemia this morning (glucose 58). PO Glucose tablet given. Held AM insulin. Pt had breakfast and lunch. Repeat accucheck this afternoon was in normal range.   - ECHO completed/reviewed. Mild AS, otherwise unremarkable. No " previous on record. ED attending physician notified of AS findings, recommends curbside to cardiology for recs. Discussed with on call cardiology fellow, does not suspect contributed to syncope, states can be followed up outpatient.          Final Diagnosis:  Syncope   FELI  Aortic stenosis   Hypoglycemia, diabetic  Hypocalcemia   HTN   Tobacco use     Discharge Condition: Stable    Disposition: Home or Self Care     Time spent on the discharge of the patient including review of hospital course with the patient. reviewing discharge medications and arranging follow-up care 35 minutes.  Patient was seen and examined on the date of discharge and determined to be suitable for discharge.    Follow Up:  Pt instructed to follow up with primary care in the next 1-2 days. Pt instructed to follow up with his cardiologist at next available. ED return precautions provided. Smoking cessation patient education and ambulatory referral to smoking cessation provided.

## 2025-08-01 NOTE — ED NOTES
Report received from WILIAN Yates. Assume care of pt. Pt resting comfortably and independently repositioned in stretcher with bed locked in lowest position for safety. NAD noted at this time. Respirations even and unlabored and visible chest rise noted.  Patient offered bathroom assistance and denies need at this time. Pt instructed to call if assistance is needed. Call light within reach. No needs at this time. Will continue to monitor periodically.

## 2025-08-01 NOTE — PROVIDER PROGRESS NOTES - EMERGENCY DEPT.
"ED Observation Unit  Progress Note      HPI   Romero Bowens Jr. is a 65 y.o. male with medical history of HTN, HLD, DM presenting to the ED with the chief complaint of syncope.      Per ED: "brought in by EMS after an episode of syncope. Patient states for the past couple of days he has not been eating or drinking as much, he is not entirely sure why. He denies any URI symptoms so if fevers chills, chest pain, shortness of breath or nausea vomiting diarrhea. Tonight he was in his kitchen and he started to feel dizzy and lightheaded and many says he woke up on the ground. He was told that he did hit his head. He says that he does not have any pain in his extremities, chest wall or hips. Patient is not on any anticoagulant or antiplatelet agents."      Per EDOU: Son heard him fall in the kitchen and came to his aid. Felt lightheaded and dizzy prior to the syncopal episode. No reported seizure-like activity. Reports he does not drink very much water or any liquids throughout the day. No ETOH use. +Marijuana use. No chest pain, back pain, abdominal pain.      In the ED, H/H 12/3 baseline, Crt 1.9 (baseline 1.2). CT head and c-spine with no traumatic injuries. ECG sinus mallika 59 bpm. He received 1L LR IV and Tylenol 1g PO.      I reviewed the ED Provider Note dated 7/31/25 prior to my evaluation of this patient.  I reviewed all labs and imaging performed in the Main ED, prior to patient being placed in Observation. Patient was placed in the ED Observation Unit for Syncope. (copy from EDOU H/P)    Interval History   - Pt reports feeling much better this morning, having breakfast, inquiring about discharge   - Receiving IV fluid infusion; repeat chemistry reviewed, serum Cr improving 1.9 --> 1.6   - Incidental mild hypocalcemia, (serum Ca 8.4, previously in normal range). PO calcium supplement ordered   - ECHO completed, report pending     PMHx   Past Medical History:   Diagnosis Date    Cervicalgia     Diabetes mellitus  "    History of hepatitis C, s/p successful Rx w/ cure (SVR12 - 9/2020)     Hypercholesteremia     Hypertension     Jaw pain     Lipoma of back     Lumbago     Premature beats, unspecified     Sciatica     Scoliosis (and kyphoscoliosis), idiopathic     Undiagnosed cardiac murmurs       Past Surgical History:   Procedure Laterality Date    COLONOSCOPY N/A 7/14/2020    Procedure: COLONOSCOPY;  Surgeon: Magdalena Sales MD;  Location: [facility];  Service: Endoscopy;  Laterality: N/A;        Family Hx   No family history on file.     Social Hx   Social History     Socioeconomic History    Marital status: Single   Tobacco Use    Smoking status: Every Day     Types: Cigarettes    Smokeless tobacco: Never    Tobacco comments:     marijuana   Substance and Sexual Activity    Alcohol use: No    Drug use: Not Currently     Types: Marijuana     Comment: daily    Sexual activity: Not Currently     Social Drivers of Health     Financial Resource Strain: High Risk (12/1/2024)    Received from [facility] SDOH Screening     In the past year, have you been unable to get any of the following when you really needed them? choose all that apply.: Clothing     In the past year, have you been unable to get any of the following when you really needed them? choose all that apply.: Internet   Food Insecurity: High Risk (12/1/2024)    Received from [facility] SDOH Screening     In the past 2 months, did you or others you live with eat smaller meals or skip meals because you didn't have money for food?: Yes   Housing Stability: High Risk (12/1/2024)    Received from [facility] SDOH Screening     What is your living situation today?: I have a place to live today, but i am worried about losing it in the future     In the past year, have you been unable to get any of the following when you really needed them? choose all that apply.: Utilities (electric, gas, and water)        Vital Signs   Vitals:     "08/01/25 0100 08/01/25 0200 08/01/25 0407 08/01/25 0727   BP: (!) 144/73 137/72 (!) 171/68 (!) 176/89   BP Location: Right arm      Patient Position: Lying      Pulse: (!) 53 (!) 59 (!) 51 (!) 52   Resp: 16   18   Temp: 98.5 °F (36.9 °C)  97.7 °F (36.5 °C) 98.6 °F (37 °C)   TempSrc: Oral  Oral Oral   SpO2: 100% 99% 96% 99%   Weight:    68.5 kg (151 lb)   Height:    5' 9" (1.753 m)        Review of Systems  Review of Systems   Constitutional:  Negative for chills, diaphoresis, fever and malaise/fatigue.   HENT:  Negative for sore throat.    Eyes:  Negative for blurred vision and double vision.   Respiratory:  Negative for cough, hemoptysis, sputum production and shortness of breath.    Cardiovascular:  Negative for chest pain, palpitations and leg swelling.   Gastrointestinal:  Negative for abdominal pain, blood in stool, diarrhea, melena, nausea and vomiting.   Genitourinary:  Negative for dysuria, flank pain, frequency, hematuria and urgency.   Musculoskeletal:  Negative for back pain, joint pain and neck pain.   Skin:  Negative for rash.   Neurological:  Negative for dizziness, tremors, sensory change, speech change, focal weakness, seizures, weakness and headaches.       Brief Physical Exam/Reassessment   Physical Exam  Vitals and nursing note reviewed.   Constitutional:       General: He is not in acute distress.     Appearance: Normal appearance. He is not ill-appearing, toxic-appearing or diaphoretic.   HENT:      Head: Normocephalic.      Mouth/Throat:      Mouth: Mucous membranes are moist.   Cardiovascular:      Rate and Rhythm: Bradycardia present.      Pulses: Normal pulses.   Pulmonary:      Effort: Pulmonary effort is normal. No respiratory distress.      Breath sounds: No stridor. No wheezing.   Abdominal:      General: There is no distension.      Tenderness: There is no abdominal tenderness. There is no guarding or rebound.   Musculoskeletal:         General: No swelling or deformity. Normal range of " motion.      Cervical back: Neck supple.   Skin:     General: Skin is warm and dry.   Neurological:      General: No focal deficit present.      Mental Status: He is alert and oriented to person, place, and time. Mental status is at baseline.      Sensory: No sensory deficit.      Motor: No weakness.      Coordination: Coordination normal.      Gait: Gait normal.   Psychiatric:         Behavior: Behavior normal.         Thought Content: Thought content normal.         Judgment: Judgment normal.         Labs/Imaging   Labs Reviewed   CBC WITH DIFFERENTIAL - Abnormal       Result Value    WBC 7.67      RBC 4.27 (*)     HGB 12.3 (*)     HCT 37.3 (*)     MCV 87      MCH 28.8      MCHC 33.0      RDW 14.0      Platelet Count 188      MPV 11.6      Nucleated RBC 0      Neut % 62.5      Lymph % 24.4      Mono % 11.3      Eos % 1.0      Basophil % 0.5      Imm Grans % 0.3      Neut # 4.79      Lymph # 1.87      Mono # 0.87      Eos # 0.08      Baso # 0.04      Imm Grans # 0.02     COMPREHENSIVE METABOLIC PANEL - Abnormal    Sodium 139      Potassium 3.7      Chloride 106      CO2 25      Glucose 86      BUN 21      Creatinine 1.9 (*)     Calcium 8.4 (*)     Protein Total 6.3      Albumin 3.4 (*)     Bilirubin Total 0.3      ALP 65      AST 20      ALT 15      Anion Gap 8      eGFR 38 (*)    HEMOGLOBIN A1C - Abnormal    Hemoglobin A1c 6.4 (*)     Estimated Average Glucose 137 (*)    BASIC METABOLIC PANEL - Abnormal    Sodium 138      Potassium 4.0      Chloride 105      CO2 26      Glucose 82      BUN 23      Creatinine 1.6 (*)     Calcium 8.4 (*)     Anion Gap 7 (*)     eGFR 47 (*)    CBC WITH DIFFERENTIAL - Abnormal    WBC 6.85      RBC 4.42 (*)     HGB 12.6 (*)     HCT 38.9 (*)     MCV 88      MCH 28.5      MCHC 32.4      RDW 14.0      Platelet Count 170      MPV 11.9      Nucleated RBC 0      Neut % 46.0      Lymph % 43.1      Mono % 8.6      Eos % 1.6      Basophil % 0.6      Imm Grans % 0.1      Neut # 3.15      Lymph #  2.95      Mono # 0.59      Eos # 0.11      Baso # 0.04      Imm Grans # 0.01     POCT GLUCOSE - Abnormal    POCT Glucose 58 (*)    CBC W/ AUTO DIFFERENTIAL    Narrative:     The following orders were created for panel order CBC auto differential.  Procedure                               Abnormality         Status                     ---------                               -----------         ------                     CBC with Differential[[id number]]       Abnormal            Final result                 Please view results for these tests on the individual orders.   CBC W/ AUTO DIFFERENTIAL    Narrative:     The following orders were created for panel order CBC Auto Differential.  Procedure                               Abnormality         Status                     ---------                               -----------         ------                     CBC with Differential[[id number]]       Abnormal            Final result                 Please view results for these tests on the individual orders.   POCT GLUCOSE MONITORING CONTINUOUS      Imaging Results              X-Ray Chest AP Portable (Final result)  Result time 08/01/25 01:45:19      Final result by Curly Lancaster MD (08/01/25 01:45:19)                   Impression:      No acute abnormality.      Electronically signed by: Curly Lancaster  Date:    08/01/2025  Time:    01:45               Narrative:    EXAMINATION:  XR CHEST AP PORTABLE    CLINICAL HISTORY:  Syncope and collapse    TECHNIQUE:  Single frontal view of the chest was performed.    COMPARISON:  11/03/2025    FINDINGS:  The lungs are clear, with normal appearance of pulmonary vasculature and no pleural effusion or pneumothorax.    The cardiac silhouette is normal in size. The hilar and mediastinal contours are unremarkable.    Bones are intact.                                       CT Head Without Contrast (Final result)  Result time 07/31/25 22:27:04      Final result by Curly Lancaster  MD CHARLES (07/31/25 22:27:04)                   Impression:      Stable CT of the brain.  No new hemorrhage, mass or infarction.    Stable CT of the cervical spine with multilevel spondylosis and stenosis but no new fracture, subluxation or hematoma.      Electronically signed by: Curly Lancaster  Date:    07/31/2025  Time:    22:27               Narrative:    EXAMINATION:  CT HEAD WITHOUT CONTRAST; CT CERVICAL SPINE WITHOUT CONTRAST    CLINICAL HISTORY:  Head trauma, minor (Age >= 65y);; Neck trauma (Age >= 65y);    TECHNIQUE:  Low dose axial images were obtained through the head, and cervical spine.  Coronal and sagittal reformations were also performed. Contrast was not administered.    COMPARISON:  11/03/2024.    FINDINGS:  BRAIN:    Brain parenchyma appears stable in attenuation with normal gray-white matter differentiation.  Calcifications in the lenticular form nuclei on the left appear stable..  No mass, mass effect or pathologic fluid collection.    Ventricles and basilar cisterns appear appropriate.    Calvarium intact.  Sinuses are clear with no significant middle ear or mastoid opacity.  Orbits and orbital contents unremarkable.    CERVICAL SPINE:    Alignment appears stable with multilevel cervical spondylosis.  Erosive endplate changes at C2-3, C3-4, C5-6 and C6-7 are again noted.  Osteophytes with central canal narrowing most pronounced at C5-6 present.  There is no new fracture, subluxation or soft tissue swelling.  Cervical cranial and cervicothoracic junction maintained.  Visceral spaces of the neck no or the for vascular calcifications in the carotid bulbs, right greater than left.                                       CT Cervical Spine Without Contrast (Final result)  Result time 07/31/25 22:27:04      Final result by Curly Lancaster MD (07/31/25 22:27:04)                   Impression:      Stable CT of the brain.  No new hemorrhage, mass or infarction.    Stable CT of the cervical spine with  multilevel spondylosis and stenosis but no new fracture, subluxation or hematoma.      Electronically signed by: Curly Lancaster  Date:    07/31/2025  Time:    22:27               Narrative:    EXAMINATION:  CT HEAD WITHOUT CONTRAST; CT CERVICAL SPINE WITHOUT CONTRAST    CLINICAL HISTORY:  Head trauma, minor (Age >= 65y);; Neck trauma (Age >= 65y);    TECHNIQUE:  Low dose axial images were obtained through the head, and cervical spine.  Coronal and sagittal reformations were also performed. Contrast was not administered.    COMPARISON:  11/03/2024.    FINDINGS:  BRAIN:    Brain parenchyma appears stable in attenuation with normal gray-white matter differentiation.  Calcifications in the lenticular form nuclei on the left appear stable..  No mass, mass effect or pathologic fluid collection.    Ventricles and basilar cisterns appear appropriate.    Calvarium intact.  Sinuses are clear with no significant middle ear or mastoid opacity.  Orbits and orbital contents unremarkable.    CERVICAL SPINE:    Alignment appears stable with multilevel cervical spondylosis.  Erosive endplate changes at C2-3, C3-4, C5-6 and C6-7 are again noted.  Osteophytes with central canal narrowing most pronounced at C5-6 present.  There is no new fracture, subluxation or soft tissue swelling.  Cervical cranial and cervicothoracic junction maintained.  Visceral spaces of the neck no or the for vascular calcifications in the carotid bulbs, right greater than left.                                       I reviewed all labs, imaging, related notes, and EKGs.     Plan   Syncopal episode  FELI    -ECG sinus mallika similar to previous  -CT head and c-spine without significant findings  -CXR completed/reviewed; unremarkable, no acute findings identified   -ECHO completed, report pending   - Repeat chemistry reviewed, serum Cr 1.9 --> 1.6; FELI improving   -Continue gentle IV fluids   -Continue telemetry   -Syncope/fall precautions             I have  discussed this case with Bjorn Ramsey PA-C

## 2025-08-01 NOTE — ED PROVIDER NOTES
Encounter Date: 7/31/2025       History     Chief Complaint   Patient presents with    Loss of Consciousness     Arrives via EMS from home for syncopal episode. +head injury/LOC -blood thinners. Endorsing HA and dizziness. +C collar in place.     65-year-old male, history of diabetes, hypertension, brought in by EMS after an episode of syncope.  Patient states for the past couple of days he has not been eating or drinking as much, he is not entirely sure why.  He denies any URI symptoms so if fevers chills, chest pain, shortness of breath or nausea vomiting diarrhea.  Tonight he was in his kitchen and he started to feel dizzy and lightheaded and many says he woke up on the ground.  He was told that he did hit his head.  He says that he does not have any pain in his extremities, chest wall or hips.  Patient is not on any anticoagulant or antiplatelet agents    The history is provided by the patient and a relative.     Review of patient's allergies indicates:  No Known Allergies  Past Medical History:   Diagnosis Date    Cervicalgia     Diabetes mellitus     History of hepatitis C, s/p successful Rx w/ cure (SVR12 - 9/2020)     Hypercholesteremia     Hypertension     Jaw pain     Lipoma of back     Lumbago     Premature beats, unspecified     Sciatica     Scoliosis (and kyphoscoliosis), idiopathic     Undiagnosed cardiac murmurs      Past Surgical History:   Procedure Laterality Date    COLONOSCOPY N/A 7/14/2020    Procedure: COLONOSCOPY;  Surgeon: Magdalena Sales MD;  Location: [facility];  Service: Endoscopy;  Laterality: N/A;     No family history on file.  Social History[1]  Review of Systems    Physical Exam     Initial Vitals [07/31/25 1919]   BP Pulse Resp Temp SpO2   (!) 101/56 60 18 98.8 °F (37.1 °C) 100 %      MAP       --         Physical Exam    Nursing note and vitals reviewed.  Constitutional: He appears well-developed and well-nourished. He is not diaphoretic. No distress.   HENT:   Head:  Normocephalic and atraumatic. Mouth/Throat: Oropharynx is clear and moist.   Eyes: Conjunctivae and EOM are normal. Pupils are equal, round, and reactive to light.   Neck:   C collar in place   Cardiovascular:  Normal rate.           Pulmonary/Chest: Breath sounds normal. No respiratory distress. He exhibits no tenderness.   Abdominal: Abdomen is soft. He exhibits no distension. There is no abdominal tenderness. There is no rebound and no guarding.   Musculoskeletal:         General: No tenderness or edema. Normal range of motion.     Neurological: He is alert and oriented to person, place, and time. He has normal strength. No cranial nerve deficit or sensory deficit. GCS score is 15. GCS eye subscore is 4. GCS verbal subscore is 5. GCS motor subscore is 6.   Skin: Skin is warm and dry.   Psychiatric: He has a normal mood and affect. Thought content normal.         ED Course   Procedures  Labs Reviewed   CBC WITH DIFFERENTIAL - Abnormal       Result Value    WBC 7.67      RBC 4.27 (*)     HGB 12.3 (*)     HCT 37.3 (*)     MCV 87      MCH 28.8      MCHC 33.0      RDW 14.0      Platelet Count 188      MPV 11.6      Nucleated RBC 0      Neut % 62.5      Lymph % 24.4      Mono % 11.3      Eos % 1.0      Basophil % 0.5      Imm Grans % 0.3      Neut # 4.79      Lymph # 1.87      Mono # 0.87      Eos # 0.08      Baso # 0.04      Imm Grans # 0.02     COMPREHENSIVE METABOLIC PANEL - Abnormal    Sodium 139      Potassium 3.7      Chloride 106      CO2 25      Glucose 86      BUN 21      Creatinine 1.9 (*)     Calcium 8.4 (*)     Protein Total 6.3      Albumin 3.4 (*)     Bilirubin Total 0.3      ALP 65      AST 20      ALT 15      Anion Gap 8      eGFR 38 (*)    CBC W/ AUTO DIFFERENTIAL    Narrative:     The following orders were created for panel order CBC auto differential.  Procedure                               Abnormality         Status                     ---------                               -----------          ------                     CBC with Differential[[id number]]       Abnormal            Final result                 Please view results for these tests on the individual orders.   HEPATITIS C ANTIBODY   HEP C VIRUS HOLD SPECIMEN   HIV 1 / 2 ANTIBODY   COMPREHENSIVE METABOLIC PANEL   MAGNESIUM          Imaging Results              CT Head Without Contrast (Final result)  Result time 07/31/25 22:27:04      Final result by Curly Lancaster MD (07/31/25 22:27:04)                   Impression:      Stable CT of the brain.  No new hemorrhage, mass or infarction.    Stable CT of the cervical spine with multilevel spondylosis and stenosis but no new fracture, subluxation or hematoma.      Electronically signed by: Curly Lancaster  Date:    07/31/2025  Time:    22:27               Narrative:    EXAMINATION:  CT HEAD WITHOUT CONTRAST; CT CERVICAL SPINE WITHOUT CONTRAST    CLINICAL HISTORY:  Head trauma, minor (Age >= 65y);; Neck trauma (Age >= 65y);    TECHNIQUE:  Low dose axial images were obtained through the head, and cervical spine.  Coronal and sagittal reformations were also performed. Contrast was not administered.    COMPARISON:  11/03/2024.    FINDINGS:  BRAIN:    Brain parenchyma appears stable in attenuation with normal gray-white matter differentiation.  Calcifications in the lenticular form nuclei on the left appear stable..  No mass, mass effect or pathologic fluid collection.    Ventricles and basilar cisterns appear appropriate.    Calvarium intact.  Sinuses are clear with no significant middle ear or mastoid opacity.  Orbits and orbital contents unremarkable.    CERVICAL SPINE:    Alignment appears stable with multilevel cervical spondylosis.  Erosive endplate changes at C2-3, C3-4, C5-6 and C6-7 are again noted.  Osteophytes with central canal narrowing most pronounced at C5-6 present.  There is no new fracture, subluxation or soft tissue swelling.  Cervical cranial and cervicothoracic junction  maintained.  Visceral spaces of the neck no or the for vascular calcifications in the carotid bulbs, right greater than left.                                       CT Cervical Spine Without Contrast (Final result)  Result time 07/31/25 22:27:04      Final result by Curly Lancaster MD (07/31/25 22:27:04)                   Impression:      Stable CT of the brain.  No new hemorrhage, mass or infarction.    Stable CT of the cervical spine with multilevel spondylosis and stenosis but no new fracture, subluxation or hematoma.      Electronically signed by: Curly Lancaster  Date:    07/31/2025  Time:    22:27               Narrative:    EXAMINATION:  CT HEAD WITHOUT CONTRAST; CT CERVICAL SPINE WITHOUT CONTRAST    CLINICAL HISTORY:  Head trauma, minor (Age >= 65y);; Neck trauma (Age >= 65y);    TECHNIQUE:  Low dose axial images were obtained through the head, and cervical spine.  Coronal and sagittal reformations were also performed. Contrast was not administered.    COMPARISON:  11/03/2024.    FINDINGS:  BRAIN:    Brain parenchyma appears stable in attenuation with normal gray-white matter differentiation.  Calcifications in the lenticular form nuclei on the left appear stable..  No mass, mass effect or pathologic fluid collection.    Ventricles and basilar cisterns appear appropriate.    Calvarium intact.  Sinuses are clear with no significant middle ear or mastoid opacity.  Orbits and orbital contents unremarkable.    CERVICAL SPINE:    Alignment appears stable with multilevel cervical spondylosis.  Erosive endplate changes at C2-3, C3-4, C5-6 and C6-7 are again noted.  Osteophytes with central canal narrowing most pronounced at C5-6 present.  There is no new fracture, subluxation or soft tissue swelling.  Cervical cranial and cervicothoracic junction maintained.  Visceral spaces of the neck no or the for vascular calcifications in the carotid bulbs, right greater than left.                                        Medications   lactated ringers bolus 1,000 mL (1,000 mLs Intravenous New Bag 7/31/25 2143)   acetaminophen tablet 1,000 mg (1,000 mg Oral Given 7/31/25 2152)     Medical Decision Making  This patient's clinical presentation is c/w syncope.  DDx for syncope or near-syncope would include serious causes such as a dangerous arrhythmia like V tach or 3rd degree heart block, orthostatic hypotension 2/2 polypharmacy, dehydration, blood loss/hemorrhage or infection/sepsis, seizure or a more benign process like vasovagal syncope.      -This patient did not sustain significant injuries with their syncope episode.  -I think the most likely etiology of the patient's syncope is dehydration/orthostatic hypotension   -EKG done in the ED is not suggestive of a dangerous arrhythmia - mild bradycardia.  -Further ED work-up that is planned includes the following tests: CBC, CMP, CT head/C-spine.      Amount and/or Complexity of Data Reviewed  External Data Reviewed: ECG and notes.  Labs: ordered. Decision-making details documented in ED Course.  Radiology: ordered and independent interpretation performed. Decision-making details documented in ED Course.  ECG/medicine tests: ordered and independent interpretation performed. Decision-making details documented in ED Course.    Risk  OTC drugs.  Decision regarding hospitalization.               ED Course as of 07/31/25 2255   Thu Jul 31, 2025 2236 WBC: 7.67 [AS]   2236 BUN: 21 [AS]   2236 Creatinine(!): 1.9 [AS]   2236 Mild FELI.  Suspect secondary to decreased p.o. intake.  Patient getting IV fluids.  Blood pressure has improved.  I have updated the patient on his results.  CT head and C-spine are negative.  I recommend that he be admitted to the ED observation unit for telemetry monitoring, repeat labs tomorrow after IV fluids, echo.  Patient in agreement. [AS]      ED Course User Index  [AS] Krupa Muro MD                               Clinical Impression:  Final  diagnoses:  [R55] Syncope and collapse (Primary)          ED Disposition Condition    Observation                       [1]   Social History  Tobacco Use    Smoking status: Every Day     Types: Cigarettes    Smokeless tobacco: Never    Tobacco comments:     marijuana   Substance Use Topics    Alcohol use: No    Drug use: Not Currently     Types: Marijuana     Comment: daily        Krupa Muro MD  07/31/25 4917

## 2025-08-04 DIAGNOSIS — Z12.11 COLON CANCER SCREENING: Primary | ICD-10-CM

## 2025-08-04 NOTE — PLAN OF CARE
[facility]  Discharge Final Note    Primary Care Provider: No, Primary Doctor    Expected Discharge Date: 8/1/2025    Pt d/c home with no needs.    Final Discharge Note (most recent)       Final Note - 08/04/25 0840          Final Note    Assessment Type Final Discharge Note (P)      Anticipated Discharge Disposition Home or Self Care (P)         Post-Acute Status    Post-Acute Authorization Other (P)      Other Status No Post-Acute Service Needs (P)      Discharge Delays None known at this time (P)                      Important Message from Medicare             Contact Info       [facility]   Specialty: Emergency Medicine    [address] [facility] [address]   Phone: [phone]       Next Steps: Follow up    Instructions: Follow up with your primary care physician in the next 1-2 days. Return to the ER if unimproved or if worse in any way.    Zoie Davis MD   Specialty: Interventional Cardiology, Cardiovascular Disease, Cardiology, Internal Medicine    [address] [facility] [address]  [address]  [place] [facility] [address]   Phone: [phone]       Next Steps: Schedule an appointment as soon as possible for a visit    Instructions: Follow up with your cardiologist in the next week for re-evaluation and further management.          DANIELLE Casey, CSW.   Case Management  [facility]  Email: [email or web address]

## 2025-08-05 ENCOUNTER — PATIENT OUTREACH (OUTPATIENT)
Dept: ADMINISTRATIVE | Facility: CLINIC | Age: 65
End: 2025-08-05
Payer: MEDICARE

## 2025-08-05 DIAGNOSIS — Z12.11 ENCOUNTER FOR SCREENING FOR MALIGNANT NEOPLASM OF COLON: Primary | ICD-10-CM

## 2025-08-07 ENCOUNTER — CLINICAL SUPPORT (OUTPATIENT)
Dept: ENDOSCOPY | Facility: HOSPITAL | Age: 65
End: 2025-08-07
Payer: MEDICARE

## 2025-08-07 ENCOUNTER — TELEPHONE (OUTPATIENT)
Dept: ENDOSCOPY | Facility: HOSPITAL | Age: 65
End: 2025-08-07

## 2025-08-07 DIAGNOSIS — Z12.11 SCREEN FOR COLON CANCER: Primary | ICD-10-CM

## 2025-08-07 DIAGNOSIS — Z12.11 ENCOUNTER FOR SCREENING FOR MALIGNANT NEOPLASM OF COLON: Primary | ICD-10-CM

## 2025-08-07 NOTE — TELEPHONE ENCOUNTER
Encounter Date: 7/19/2017    SCRIBE #1 NOTE: IAdali, am scribing for, and in the presence of, Dr Adorno.       History     Chief Complaint   Patient presents with    Insect Bite     right wrist. had drained monday.     07/19/2017  7:31 PM     Chief Complaint: Insect Bite      Valentin Meneses is a 1 y.o. male presenting to the E.D. with an acute onset of an insect bite to right wrist which originally occurred four days ago. Pt was seen in the ED two days ago for abscess which was I&D'd. Mother states since being seen and discharged with Bactrim, wound has not increased or decreased in size. Denies fever. Pt has no past surgical history on file.        The history is provided by the patient.     Review of patient's allergies indicates:  No Known Allergies  Past Medical History:   Diagnosis Date    Croupous bronchitis     H/O seasonal allergies     Infantile autism      History reviewed. No pertinent surgical history.  History reviewed. No pertinent family history.  Social History   Substance Use Topics    Smoking status: Never Smoker    Smokeless tobacco: Never Used    Alcohol use No     Review of Systems   Constitutional: Negative for fever.   HENT: Negative for sore throat.    Respiratory: Negative for cough.    Cardiovascular: Negative for palpitations.   Gastrointestinal: Negative for nausea.   Genitourinary: Negative for difficulty urinating.   Musculoskeletal: Negative for joint swelling.   Skin: Positive for wound (right wrist). Negative for rash.   Neurological: Negative for seizures.   Hematological: Does not bruise/bleed easily.       Physical Exam     Initial Vitals [07/19/17 1853]   BP Pulse Resp Temp SpO2   -- (!) 117 20 98.1 °F (36.7 °C) 98 %      MAP       --         Physical Exam    Nursing note and vitals reviewed.  Constitutional: He appears well-developed and well-nourished. He is active.   HENT:   Right Ear: Tympanic membrane normal.   Left Ear: Tympanic membrane normal.  Patient is scheduled for a Colonoscopy on 10/1/25 with Dr. SUE Pereyra  Referral for procedure from  [facility] referral (see Appts tab)  Instructions as below mailed to address on file:      [facility] Colonoscopy Prep and Procedure Instructions  Polyethylene Glycol (PEG) Solution    Date of procedure: 10/1/25 - Arrive at 12:00 PM  Location of Department:   [facility] [address] [place] [address]  Take the [facility] Elevators to 4th Floor Endoscopy Lab    Getting ready for your procedure:    If your procedure requires the administration of anesthesia, it is necessary for a responsible adult to drive you home. The designated adult is strongly encouraged to remain in the endoscopy area until the patient is discharged. (Medical Transportation, Uber, Lyft, Taxi, etc. may ONLY be used if a responsible adult is present to accompany you home. The responsible adult CANNOT be the  of the service.)   person must be available to return to pick you up within 15 minutes of being notified of discharge.  Please bring a picture ID, insurance card, & co-pay.  Leave all jewelry and valuables at home on the day of the procedure.  Do not follow the instructions that come in the prep box - use these instructions instead.    Medications:    If you begin taking any new blood thinning, weight loss, or diabetic medications please call Endoscopy Scheduling as soon as possible at [phone].  If you take heart, blood pressure, seizure, pain, (including inhalers/nebulizers), anti-rejection (transplant patients) or mental health medications, please take at your regular times with a sip of water.  If you are taking diabetic or weight loss medications, see the attached instructions.    COLONOSCOPY PREP TIMELINE    ONE WEEK PRIOR TO PROCEDURE   the prep kit and Dulcolax laxative tablets (bisacodyl 5mg - available over the counter) from your local pharmacy.  Purchase clear liquids for use during the    Mouth/Throat: Mucous membranes are moist. Oropharynx is clear.   Eyes: EOM are normal. Pupils are equal, round, and reactive to light.   Neck: Neck supple.   Cardiovascular: Normal rate and regular rhythm. Pulses are strong.    No murmur heard.  Pulmonary/Chest: Effort normal and breath sounds normal. He has no wheezes. He has no rhonchi. He has no rales.   Abdominal: Soft. He exhibits no distension. There is no tenderness.   Musculoskeletal: Normal range of motion.   Neurological: He is alert.   Skin:   Area of redness and fluctuance to ulnar aspect of right rist with mild warmth and tenderness         ED Course   I & D - Incision and Drainage  Date/Time: 7/20/2017 4:51 AM  Performed by: MONTSE GAUTAM.  Authorized by: MONTSE GAUTAM.   Consent Done: Not Needed  Type: abscess  Body area: upper extremity  Location details: right arm  Anesthesia: see MAR for details  Patient sedated: yes  Sedation type: anxiolysis  (See MAR for exact dosages of medications).  Sedation: intranasal versed.  Vitals: Vital signs were monitored during sedation.  Risk factor: underlying major vessel  Scalpel size: 11  Incision type: single straight  Complexity: simple  Drainage: pus  Drainage amount: moderate  Wound treatment: incision and  expression of material  Packing material: 1/4 in gauze  Complications: No  Patient tolerance: Patient tolerated the procedure well with no immediate complications        Labs Reviewed   CULTURE, AEROBIC  (SPECIFY SOURCE)             Medical Decision Making:   History:   Old Medical Records: I decided to obtain old medical records.  Initial Assessment:   This is an emergent evaluation of a patient with complaints of a skin infection.     I decided to obtain and review the old medical record.    Clinically the patient has an abscess. The patient's abscess has been incised and drained.  The patient will be placed on antibiotics.  The patient has no signs of systemic symptoms or significant  prep referencing the chart below.    CLEAR LIQUIDS NOT CLEAR LIQUIDS (DO NOT DRINK)   Water X Milk   Clear broth (chicken, beef, or vegetable) X Smoothies   Apple Juice (no pulp) X Hot Chocolate   White grape juice X Juices with pulp (orange juice, prune juice)   Sports drinks (AVOID red, purple, or blue) X Coffee WITH cream or milk   Clear soda X Beverages with particles (shakes, milkshakes)   Tea or coffee (black, NO MILK)    Clear gelatin (Jell-o, AVOID red, purple, or blue)      THE DAY BEFORE YOUR PROCEDURE: 9/30/25    Consume ONLY CLEAR LIQUIDS for the entire day.  Stay hydrated.  NO SOLID FOOD    2:00 PM  Mix the PEG solution as directed on container and place in the refrigerator.  Take four (4) Dulcolax laxative (bisacodyl 5mg) tablets with at least one full glass of clear liquids.      6:00 - 7:00 PM  Drink half the liquid in the container within one (1) hour.  Drink an 8-ounce glass of the mixture every 10 to 20 minutes until half is gone.  Refrigerate the remaining half of the liquid for dose 2.   If you experience nausea, bloating, or cramping, slow down drinking until your symptoms decrease.    THE DAY OF YOUR PROCEDURE: 10/1/25    2:00 - 3:00 AM  Drink remaining half of the liquid in the container within one (1) hour.  Drink an 8-ounce glass of the mixture every 10 to 20 minutes.    ** May continue to drink clear liquids until 4 hours prior to arrival time for procedure **    Who to call if you have questions:    To reschedule / cancel, or for prep questions: [phone] / Hours of operation Monday-Friday 8:00 AM-4:30 PM  Insurance or financial information: [phone] or [phone]  After hours concerns, call [facility] Line at [phone] or [phone]    Please watch this informational video:  [email or web address]/[email or web address]    Please contact your Diabetes Care Provider if you have questions or are preparing for more than one day before your procedure. For any  cellulitis to warrant admission at this time.  The patient has been instructed to follow up with their regular doctor or the emergency department in 2 - 3 days for packing removal.  I've given the patient specific return precautions.  The patient did not improve on Bactrim from previous incision and drainage.  Clindamycin given in the ED and will be prescribed to him.    Wound care has been discussed.      The results and physical exam findings were reviewed with the patient. Pt agrees with assessment, disposition and treatment plan and has no further questions or complaints at this time.    Daryl Adorno M.D. 4:52 AM 7/20/2017                  Scribe Attestation:   Scribe #1: I performed the above scribed service and the documentation accurately describes the services I performed. I attest to the accuracy of the note.    Attending Attestation:           Physician Attestation for Scribe:  Physician Attestation Statement for Scribe #1: I, Dr Adorno, reviewed documentation, as scribed by Adali Temple in my presence, and it is both accurate and complete.                 ED Course     Clinical Impression:   The encounter diagnosis was Abscess of right arm.                           Daryl Adorno MD  07/20/17 0452     scheduling questions, contact the Endoscopy Schedulers at [phone]. Please disregard this guide if you do not take any of these medications.     Weight loss and Diabetes Medication Holding Instructions:     Oral Medicine Day before procedure Day of Procedure   Glyburide Do NOT take Do NOT take morning of procedure. If you  take twice daily, take with dinner.   Glucotrol (Glipizide)     Amaryl (Glimepiride)        Glucophage Take as  prescribed Do NOT take morning of procedure. Take  when you start eating again.   Glumetza     Fortamet (Metformin)        Januvia (Sitaglipitin) Take as  prescribed Do NOT take morning of procedure. Take  when you start eating again.   Nesina (Aloglipitin)     Onglyza (Saxaglipitin)     Tradjenta (Linaglipitin)        Invokana (canagliflozin) see prep   instructions  for date of  last dose Do NOT take morning of procedure. Take  when you start eating again.   Farxiga (dapagliflozin)     Jardiance (empagliflozin)     Steglatro (ertugliflozin)     Brenzavvy (bexagliflozin)     Inpefa (sotagliflozin)     Invokamet/Invokamet XR (Invokana + metformin)     Xigduo (Farxiga + metformin)     Synjardy  XR (Jardiance + metformin)     Segluromet (Steglatro + metformin)     Qtern (Farxiga + saxagliptin)     Glyxambi (Jardiance + linagliptin)     Trijardy XR (empagliflozin + linagliptin + metformin)     Steglujan (Steglatro + sitagliptin)        Actos (Pioglitazone) Take as  prescribed. Do NOT take morning of procedure. Take  when you start eating again.            Rybelsus (semaglutide) Take as   prescribed. Do NOT take morning of procedure. Take   when you start eating again.             Adipex/Lomaria (phentermine) see prep  instructions  for date of  last dose Do NOT take morning of procedure. Take  when you start eating again.   Qsymia (phentermine  + topiramate)              Tenuate/Tepanil (diethylpropion) see prep  instructions  for date of last dose Do NOT take morning of procedure.  Take  when you start eating again.                  Xenical (orilstat) see prep   instructions  for date of last dose Do NOT take morning of procedure. Take  when you start eating again.                  Injectable & Combination  Medicine (taken daily) Day before Procedure Day of Procedure   Victoza/Saxenda (liragluide) Take as prescribed Do NOT take morning of procedure. Take  when you start eating again.   Byetta (exenatide)     Gattex (teduglutide)     Adlyxin (lixisenatide) *     Soliqua (lixisenatide + insulin glargine)     Xultophy (liraglutide + insulin degludec)        Injectable  Medicine  (taken weekly) Day before Procedure Day of Procedure   Bydureon bcise (exenatide ER) see prep  instructions  for date of  last dose Do NOT take morning of procedure. Take   when you start eating again.   Mounjaro/Zepbound (tirzepatide)     Ozempic/Wegovy (semaglutide)     Tanzeum (albiglutide) *     Trulicity (dulaglutide)     It is important to monitor your blood sugar while doing the bowel preparation. On the day of your bowel prep, when you are on a clear liquid diet, you may drink beverages with sugar as your source of glucose. Be sure to mix the prep with water or sugar free liquid only. Below are instructions on how to adjust your diabetic medications prior to your scheduled procedure. Call the healthcare provider who manages your diabetes if you have questions.   Insulin for Type 1 Diabetes Mellitus Day before Procedure                                         Day of procedure    Basaglar                   If you use in the morning, take as prescribed.  If you use in the evening, inject 70% of dose. If you take in the morning,   inject 80% of dose. If you take  in the evenings, inject usual   dose.    Lantus            Levemir     Priyank Brown                         Count carbs and adjust dose accordingly. If not carb counting, take 25% of usual meal dose. May use correction dose  every 4 hours. Do NOT use once sugar-free liquid prep is started.      Do NOT take morining of procedure.        Take when you start eating again.    Apidra                           Humalog 100                           Humalog 200                            Novolog                                                Insulin Pump                     SEE INSULIN PUMP INSTRUCTIONS      Insulin for Type 2 Diabetes Mellitus Day before Procedure                                         Day of procedure    Basaglar                         If you use in the morning, take as prescribed.   If you use in the evening, inject 70% of dose. If you take in the morning,   inject 80% of dose. If you take  in the evenings, inject usual   dose.    Lantus                             Levemir     Toucresencio Workmansiba        Afreyamiletha                         Inject 50 % of dose with clear liquid diet.   Do NOT use once sugar-free clear liquid prep is started. If you are using a correction scale, take dose every 4 hours as needed. Do NOT take morning of   procedure. Take when you   start eating meals again.    Apidra                            Fiasp                    Humalog 100                        Humalog 200     Novolog        NPH                          Inject 50% of breakfast and dinner doses with clear liquid diet.  Do NOT take morning of  procedure. Take usual dose  when you eat dinner.                                        Regular       Inject 50% of breakfast dose and do NOT takedinner dose once sugar-free liquid prep has started. If using correction scale, may take dose every 6 hours as needed.  Do NOT take morning of   procedure. Take usual dose   when you eat dinner.                                                           Novolog Mix 70/30                       Inject 50% of breakfast dose. Inject 25% of dinner dose. Do NOT take breakfast dose.   Take usual dose when you eat   dinner.    Humolog Mix 75/25                           "  Humolog Mix 50/50        U500                        Take 50% of AM or breakfast unit gerald dose.Take 25% of lunch or dinner or PM unit gerald dose.  Do NOT take mornining of   procedure. Take when you   start eating again.                                 V-Go                        Continue to wear your V-Go device. Do NOT click once sugar-free clear liquid prep is started.  Continue to wear your V-Go  device. Resume clicks with   meals.                                    INSULIN PUMP GUIDANCE Day before Procedure Day of Procedure   Pump and CGM do not communicate      Count carbs and bolus for carbs and correction as needed. May use temp basal rate of 70% once sugar-free clear prep is started. Continue until after procedure the following day Continue use of 70% temp basal rate until procedure complete and you are eating normally        Pump and CGM do communicate     Tandem Tslim and Mobi Count carbs and bolus for carbs and correction as needed. Start "Exercise" mode once sugar-free clear prep is started. Continue until after procedure the following day Continue use of "Exercise" mode until procedure complete and you are eating normally        Omnipod 5 Count carbs and bolus for carbs and correction as needed. Start "Activity" mode once sugar-free clear prep is started. Continue until after procedure the following day Continue use of "Activity" mode until procedure complete and you are eating normally        Medtronic 670g/770g/780g Count carbs and bolus for carbs and correction as needed. Start "Temp target" mode once sugar-free clear prep is started. Continue until after procedure the following day Continue use of temp target until procedure complete and you are eating normally        Beta Bionic ilet Count carbs and bolus for carbs and correction as needed. Let pump run as you usually would Let pump run as you usually would     "